# Patient Record
Sex: MALE | Race: WHITE
[De-identification: names, ages, dates, MRNs, and addresses within clinical notes are randomized per-mention and may not be internally consistent; named-entity substitution may affect disease eponyms.]

---

## 2017-01-25 ENCOUNTER — HOSPITAL ENCOUNTER (OUTPATIENT)
Dept: HOSPITAL 45 - C.RDSM | Age: 57
Discharge: HOME | End: 2017-01-25
Attending: ORTHOPAEDIC SURGERY
Payer: COMMERCIAL

## 2017-01-25 DIAGNOSIS — M25.461: Primary | ICD-10-CM

## 2017-01-25 DIAGNOSIS — M17.11: ICD-10-CM

## 2017-01-25 NOTE — DIAGNOSTIC IMAGING REPORT
RIGHT KNEE RADIOGRAPHS WITH COMPARISON STANDING AP RADIOGRAPH OF THE LEFT KNEE



CLINICAL HISTORY: Chronic right knee pain.    



COMPARISON: Knee radiographs August 7, 2015.



FINDINGS:  Comparison standing AP radiograph of left knee demonstrates arthritis

within the patellofemoral compartment is difficult to assess on this exam. There

is marked joint space narrowing with extensive osteophytosis of the

patellofemoral compartment of the right knee. There is moderate medial component

joint space narrowing with osteophytosis. No fracture is identified. There may

be a small right knee joint effusion.



IMPRESSION: 



1. Severe arthritis within the right knee, most pronounced within the medial and

patellofemoral compartments.



2. Small right knee joint effusion.



3. No acute fracture.







Electronically signed by:  Farhat Gaming M.D.

1/25/2017 12:44 PM



Dictated Date/Time:  1/25/2017 12:42 PM

## 2017-03-04 VITALS — HEART RATE: 70 BPM | SYSTOLIC BLOOD PRESSURE: 121 MMHG | OXYGEN SATURATION: 95 % | DIASTOLIC BLOOD PRESSURE: 72 MMHG

## 2017-03-05 ENCOUNTER — HOSPITAL ENCOUNTER (INPATIENT)
Dept: HOSPITAL 45 - C.EDB | Age: 57
LOS: 11 days | Discharge: HOME | DRG: 682 | End: 2017-03-16
Attending: HOSPITALIST | Admitting: HOSPITALIST
Payer: COMMERCIAL

## 2017-03-05 VITALS
HEIGHT: 71 IN | WEIGHT: 315 LBS | HEIGHT: 71 IN | BODY MASS INDEX: 44.1 KG/M2 | BODY MASS INDEX: 44.1 KG/M2 | WEIGHT: 315 LBS

## 2017-03-05 VITALS — SYSTOLIC BLOOD PRESSURE: 101 MMHG | HEART RATE: 76 BPM | DIASTOLIC BLOOD PRESSURE: 52 MMHG

## 2017-03-05 VITALS — HEART RATE: 75 BPM | OXYGEN SATURATION: 98 %

## 2017-03-05 VITALS — DIASTOLIC BLOOD PRESSURE: 76 MMHG | OXYGEN SATURATION: 95 % | HEART RATE: 74 BPM | SYSTOLIC BLOOD PRESSURE: 119 MMHG

## 2017-03-05 VITALS
TEMPERATURE: 97.34 F | SYSTOLIC BLOOD PRESSURE: 87 MMHG | DIASTOLIC BLOOD PRESSURE: 50 MMHG | OXYGEN SATURATION: 91 % | HEART RATE: 68 BPM

## 2017-03-05 VITALS — HEART RATE: 72 BPM | OXYGEN SATURATION: 96 % | DIASTOLIC BLOOD PRESSURE: 70 MMHG | SYSTOLIC BLOOD PRESSURE: 116 MMHG

## 2017-03-05 VITALS
SYSTOLIC BLOOD PRESSURE: 58 MMHG | HEART RATE: 69 BPM | DIASTOLIC BLOOD PRESSURE: 27 MMHG | OXYGEN SATURATION: 95 % | TEMPERATURE: 97.34 F

## 2017-03-05 VITALS
TEMPERATURE: 98.78 F | HEART RATE: 80 BPM | SYSTOLIC BLOOD PRESSURE: 107 MMHG | DIASTOLIC BLOOD PRESSURE: 67 MMHG | OXYGEN SATURATION: 94 %

## 2017-03-05 VITALS — OXYGEN SATURATION: 94 %

## 2017-03-05 VITALS — DIASTOLIC BLOOD PRESSURE: 49 MMHG | HEART RATE: 71 BPM | SYSTOLIC BLOOD PRESSURE: 82 MMHG

## 2017-03-05 VITALS — DIASTOLIC BLOOD PRESSURE: 32 MMHG | SYSTOLIC BLOOD PRESSURE: 64 MMHG | HEART RATE: 70 BPM

## 2017-03-05 VITALS — DIASTOLIC BLOOD PRESSURE: 46 MMHG | HEART RATE: 68 BPM | SYSTOLIC BLOOD PRESSURE: 91 MMHG

## 2017-03-05 VITALS
TEMPERATURE: 98.42 F | SYSTOLIC BLOOD PRESSURE: 122 MMHG | DIASTOLIC BLOOD PRESSURE: 68 MMHG | HEART RATE: 75 BPM | OXYGEN SATURATION: 98 %

## 2017-03-05 VITALS — SYSTOLIC BLOOD PRESSURE: 87 MMHG | HEART RATE: 70 BPM | DIASTOLIC BLOOD PRESSURE: 44 MMHG

## 2017-03-05 DIAGNOSIS — T40.2X1A: ICD-10-CM

## 2017-03-05 DIAGNOSIS — F11.20: ICD-10-CM

## 2017-03-05 DIAGNOSIS — E23.0: ICD-10-CM

## 2017-03-05 DIAGNOSIS — Z81.8: ICD-10-CM

## 2017-03-05 DIAGNOSIS — I95.9: ICD-10-CM

## 2017-03-05 DIAGNOSIS — Z79.52: ICD-10-CM

## 2017-03-05 DIAGNOSIS — G40.909: ICD-10-CM

## 2017-03-05 DIAGNOSIS — E53.8: ICD-10-CM

## 2017-03-05 DIAGNOSIS — E03.9: ICD-10-CM

## 2017-03-05 DIAGNOSIS — E86.1: ICD-10-CM

## 2017-03-05 DIAGNOSIS — N17.0: Primary | ICD-10-CM

## 2017-03-05 DIAGNOSIS — E55.9: ICD-10-CM

## 2017-03-05 DIAGNOSIS — E83.51: ICD-10-CM

## 2017-03-05 DIAGNOSIS — Z91.030: ICD-10-CM

## 2017-03-05 DIAGNOSIS — F10.20: ICD-10-CM

## 2017-03-05 DIAGNOSIS — E83.39: ICD-10-CM

## 2017-03-05 DIAGNOSIS — I12.9: ICD-10-CM

## 2017-03-05 DIAGNOSIS — E27.40: ICD-10-CM

## 2017-03-05 DIAGNOSIS — Z83.6: ICD-10-CM

## 2017-03-05 DIAGNOSIS — E87.6: ICD-10-CM

## 2017-03-05 DIAGNOSIS — M19.90: ICD-10-CM

## 2017-03-05 DIAGNOSIS — E83.42: ICD-10-CM

## 2017-03-05 DIAGNOSIS — E78.5: ICD-10-CM

## 2017-03-05 DIAGNOSIS — K21.9: ICD-10-CM

## 2017-03-05 DIAGNOSIS — G92: ICD-10-CM

## 2017-03-05 DIAGNOSIS — E87.2: ICD-10-CM

## 2017-03-05 DIAGNOSIS — F32.9: ICD-10-CM

## 2017-03-05 DIAGNOSIS — N18.3: ICD-10-CM

## 2017-03-05 DIAGNOSIS — Z82.49: ICD-10-CM

## 2017-03-05 DIAGNOSIS — F17.220: ICD-10-CM

## 2017-03-05 DIAGNOSIS — E66.01: ICD-10-CM

## 2017-03-05 LAB
ALBUMIN/GLOB SERPL: 0.6 {RATIO} (ref 0.9–2)
ALBUMIN/GLOB SERPL: 0.7 {RATIO} (ref 0.9–2)
ALP SERPL-CCNC: 55 U/L (ref 45–117)
ALP SERPL-CCNC: 62 U/L (ref 45–117)
ALT SERPL-CCNC: 39 U/L (ref 12–78)
ALT SERPL-CCNC: 39 U/L (ref 12–78)
ANION GAP SERPL CALC-SCNC: 19 MMOL/L (ref 3–11)
ANION GAP SERPL CALC-SCNC: 21 MMOL/L (ref 3–11)
ANION GAP SERPL CALC-SCNC: 24 MMOL/L (ref 3–11)
APPEARANCE UR: (no result)
AST SERPL-CCNC: 70 U/L (ref 15–37)
AST SERPL-CCNC: 91 U/L (ref 15–37)
BASE EXCESS STD BLDA CALC-SCNC: -11 MEQ/L (ref -9–1.8)
BASE EXCESS STD BLDV CALC-SCNC: -9.9 MMOL/L
BASOPHILS # BLD: 0.01 K/UL (ref 0–0.2)
BASOPHILS NFR BLD: 0.1 %
BENZODIAZ UR-MCNC: (no result) UG/L
BILIRUB UR-MCNC: (no result) MG/DL
BUN SERPL-MCNC: 105 MG/DL (ref 7–18)
BUN SERPL-MCNC: 106 MG/DL (ref 7–18)
BUN SERPL-MCNC: 108 MG/DL (ref 7–18)
BUN/CREAT SERPL: 10.8 (ref 10–20)
BUN/CREAT SERPL: 8.8 (ref 10–20)
BUN/CREAT SERPL: 9.8 (ref 10–20)
CALCIUM SERPL-MCNC: 6.6 MG/DL (ref 8.5–10.1)
CALCIUM SERPL-MCNC: 6.7 MG/DL (ref 8.5–10.1)
CALCIUM SERPL-MCNC: 6.7 MG/DL (ref 8.5–10.1)
CHLORIDE SERPL-SCNC: 91 MMOL/L (ref 98–107)
CHLORIDE SERPL-SCNC: 91 MMOL/L (ref 98–107)
CHLORIDE SERPL-SCNC: 92 MMOL/L (ref 98–107)
CO2 SERPL-SCNC: 15 MMOL/L (ref 21–32)
CO2 SERPL-SCNC: 18 MMOL/L (ref 21–32)
CO2 SERPL-SCNC: 19 MMOL/L (ref 21–32)
COLOR UR: YELLOW
COMPLETE: YES
CREAT CL PREDICTED SERPL C-G-VRATE: 10.4 ML/MIN
CREAT CL PREDICTED SERPL C-G-VRATE: 9.2 ML/MIN
CREAT SERPL-MCNC: 11 MG/DL (ref 0.6–1.4)
CREAT SERPL-MCNC: 12 MG/DL (ref 0.6–1.4)
CREAT SERPL-MCNC: 9.7 MG/DL (ref 0.6–1.4)
CRP SERPL-MCNC: 29.2 MG/DL (ref 0–0.29)
EOSINOPHIL NFR BLD AUTO: 220 K/UL (ref 130–400)
GLOBULIN SER-MCNC: 4.4 GM/DL (ref 2.5–4)
GLOBULIN SER-MCNC: 4.7 GM/DL (ref 2.5–4)
GLUCOSE SERPL-MCNC: 103 MG/DL (ref 70–99)
GLUCOSE SERPL-MCNC: 154 MG/DL (ref 70–99)
GLUCOSE SERPL-MCNC: 85 MG/DL (ref 70–99)
HCT VFR BLD CALC: 41.3 % (ref 42–52)
IG%: 0.3 %
IMM GRANULOCYTES NFR BLD AUTO: 9.6 %
INR PPP: 1 (ref 0.9–1.1)
ISTAT ARTERIAL BLD GAS O2 SAT: 85 % (ref 90–95)
ISTAT ARTERIAL BLOOD GAS HCO3: 18 MEQ/L (ref 19–24)
ISTAT ARTERIAL BLOOD GAS PCO2: 52 MMHG (ref 35–46)
ISTAT ARTERIAL BLOOD GAS PH: 7.15 (ref 7.35–7.45)
ISTAT ARTERIAL BLOOD GAS PO2: 65 MMHG (ref 80–95)
ISTAT CARBON DIOXIDE: 20 MEQ/L (ref 24–31)
ISTAT DELIVERY SYSTEM: (no result)
LYMPHOCYTES # BLD: 1.25 K/UL (ref 1.2–3.4)
MANUAL MICROSCOPIC REQUIRED?: NO
MCH RBC QN AUTO: 31.2 PG (ref 25–34)
MCHC RBC AUTO-ENTMCNC: 34.4 G/DL (ref 32–36)
MCV RBC AUTO: 90.8 FL (ref 80–100)
MONOCYTES NFR BLD: 5.9 %
MUCOUS THREADS URNS QL MICRO: PRESENT
NEUTROPHILS # BLD AUTO: 0.8 %
NEUTROPHILS NFR BLD AUTO: 83.3 %
NITRITE UR QL STRIP: (no result)
PCO2 BLDV: 49 MMHG (ref 38–50)
PCP UR-MCNC: (no result) UG/L
PH UR STRIP: 5 [PH] (ref 4.5–7.5)
PHOSPHATE SERPL-MCNC: 8.9 MG/DL (ref 2.5–4.9)
PHOSPHATE SERPL-MCNC: 9.8 MG/DL (ref 2.5–4.9)
PMV BLD AUTO: 9.4 FL (ref 7.4–10.4)
PO2 BLDV: 68 MMHG
POTASSIUM SERPL-SCNC: 4.1 MMOL/L (ref 3.5–5.1)
POTASSIUM SERPL-SCNC: 4.1 MMOL/L (ref 3.5–5.1)
POTASSIUM SERPL-SCNC: 4.4 MMOL/L (ref 3.5–5.1)
PROTHROMBIN TIME: 11.2 SECONDS (ref 9–12)
RBC # BLD AUTO: 4.55 M/UL (ref 4.7–6.1)
REVIEW REQ?: YES
SAO2 % BLDV FROM PO2: 89.9 %
SODIUM SERPL-SCNC: 129 MMOL/L (ref 136–145)
SODIUM SERPL-SCNC: 130 MMOL/L (ref 136–145)
SODIUM SERPL-SCNC: 131 MMOL/L (ref 136–145)
SP GR UR STRIP: 1.01 (ref 1–1.03)
TSH SERPL-ACNC: < 0.005 UIU/ML (ref 0.3–4.5)
URINE BILL WITH OR WITHOUT MIC: (no result)
URINE EPITHELIAL CELL AUTO: >30 /LPF (ref 0–5)
UROBILINOGEN UR-MCNC: (no result) MG/DL
WBC # BLD AUTO: 12.97 K/UL (ref 4.8–10.8)

## 2017-03-05 PROCEDURE — 05HM03Z INSERTION OF INFUSION DEVICE INTO RIGHT INTERNAL JUGULAR VEIN, OPEN APPROACH: ICD-10-PCS | Performed by: ANESTHESIOLOGY

## 2017-03-05 RX ADMIN — NOREPINEPHRINE BITARTRATE PRN MLS/HR: 1 INJECTION, SOLUTION, CONCENTRATE INTRAVENOUS at 23:04

## 2017-03-05 RX ADMIN — ERGOCALCIFEROL SCH INTERUNIT: 1.25 CAPSULE, LIQUID FILLED ORAL at 17:45

## 2017-03-05 RX ADMIN — HEPARIN SODIUM SCH UNIT: 10000 INJECTION, SOLUTION INTRAVENOUS; SUBCUTANEOUS at 21:50

## 2017-03-05 RX ADMIN — SODIUM CHLORIDE SCH MLS/HR: 900 INJECTION, SOLUTION INTRAVENOUS at 15:09

## 2017-03-05 RX ADMIN — SODIUM CHLORIDE SCH MLS/HR: 900 INJECTION, SOLUTION INTRAVENOUS at 23:03

## 2017-03-05 RX ADMIN — NOREPINEPHRINE BITARTRATE PRN MLS/HR: 1 INJECTION, SOLUTION, CONCENTRATE INTRAVENOUS at 15:07

## 2017-03-05 RX ADMIN — HYDROCORTISONE SODIUM SUCCINATE SCH MLS/MIN: 100 INJECTION, POWDER, FOR SOLUTION INTRAMUSCULAR; INTRAVENOUS at 17:45

## 2017-03-05 RX ADMIN — DOXYCYCLINE SCH MLS/HR: 100 INJECTION, POWDER, LYOPHILIZED, FOR SOLUTION INTRAVENOUS at 15:28

## 2017-03-05 RX ADMIN — CEFTRIAXONE SODIUM SCH MLS/HR: 1 INJECTION, POWDER, FOR SOLUTION INTRAVENOUS at 17:44
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
## 2017-03-05 NOTE — CRITICAL CARE CONSULTATION
Critical Care Consultation


Date of Consultation:


Mar 5, 2017.


Attending Physician:


Odell Rees MD


Reason for Consultation:


Acute renal failure, hypotension


History of Present Illness


Patient is a 57-year-old male who has had waxing and waning mental status over 

the last 2-3 days.  Patient's wife brought the patient to the emergency room 

today for further evaluation.  She stated that within the last week the patient 

was being evaluated for knee pain and was possibly given a prescription for 

Percocet.  There is a history of narcotic use and abuse and reportedly taking 

them every 4 hours.  Upon arrival in the emergency department the patient was 

discovered to have a profoundly elevated BUN and creatinine, he was also given 

Narcan and had some improvement in his mental status.  There are concerns about 

possible volume overload given his chest x-ray findings.





Past Medical/Surgical History


History of alcohol abuse, hypertension, chronic kidney disease stage III, 

depression with anxiety, GERD, gout, history of substance abuse, hyperlipidemia

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 

seizure disorder, osteoarthritis, hypothyroidism.





Family History





Cancer


FH: multiple myeloma


  FATHER


Hypertension


  FATHER





Social History


Smoking Status:  Never Smoker


Smokeless Tobacco Use:  Yes


Alcohol Use:  none


Marital Status:  


Housing Status:  lives with family





Allergies


Coded Allergies:  


     BEE STING (Verified  Allergy, Severe, anaphylaxis, 3/5/17)


     NSAIDs (Verified  Allergy, Unknown, contraindicated d/t med hx., 3/5/17)


     Simvastatin (Verified  Adverse Reaction, Unknown, unknown, 3/5/17)





Home Medications


Scheduled


Amitriptyline Hcl (Elavil), 10 MG PO HS


Atenolol (Atenolol), 50 MG PO DAILY


Benazepril/Hctz (Lotensin Hct), 2 TABS PO DAILY


Diazepam (Valium), 2 MG PO Q12


Duloxetine HCl (Duloxetine HCl), 60 MG PO DAILY


Duloxetine HCl (Duloxetine HCl), 30 MG PO DAILY


Gabapentin (Neurontin), 1 TAB PO TID


Hydrocortisone (Cortef), 10 MG PO QPM


Hydrocortisone (Cortef), 20 MG PO QAM


Levothyroxine Sodium (Synthroid), 300 MCG PO DAILY


Lorazepam (Lorazepam), 1 TAB PO BID


Omeprazole (Prilosec), 40 MG PO DAILY


Sertraline HCl (Sertraline HCl), 50 MG PO BID


Somatropin (Humatrope), 1 EA SQ DAILY


Testosterone Cypionate (Testosterone Cypionate), 100 MG IM WK


Zolpidem Tartrate (Ambien), 10 MG PO HS





Scheduled PRN


Acetaminophen (Tylenol), 1,000 MG PO TID PRN for Pain


Furosemide (Furosemide), 40 MG PO DAILY PRN for edema


Oxycodone/Acetaminophen 10MG/325MG (Percocet 10MG/325MG), 1 TAB PO Q6 PRN for 

Pain





Current Inpatient Medications





 Current Inpatient Medications








 Medications


  (Trade)  Dose


 Ordered  Sig/Joce


 Route  Start Time


 Stop Time Status Last Admin


Dose Admin


 


 Ceftriaxone


 Sodium 1 gm/


 Dextrose  50 ml @ 


 100 mls/hr  Q24H


 IV  3/5/17 15:00


 3/12/17 14:59   


 


 


 Doxycycline


 Hyclate/Dextrose


  (Vibramycin IV/


 D5 100ml)  110 ml @ 


 50 mls/hr  Q12H


 IV  3/5/17 15:00


 3/12/17 14:59   


 


 


 Heparin Sodium


  (Porcine) 5000


 unit  5,000 unit  Q12H


 SQ  3/5/17 12:45


 4/4/17 12:44 UNV  


 


 


 Hydrocortisone


 Sodium Succinate


 50 mg/Syringe  1 ml @ 4


 mls/min  Q6H


 IV  3/5/17 18:30


 4/4/17 18:29   


 


 


 Sodium Chloride


  (Nss 1000ml)  1,000 ml @ 


 125 mls/hr  Q8H


 IV  3/5/17 12:45


 4/4/17 12:44   


 


 


 Levothyroxine


 Sodium


  (Synthroid Tab)  300 mcg  DAILYBB


 PO  3/6/17 06:00


 4/5/17 05:59   


 


 


 Non-Formulary


 Medication


  (Somatropin


  (Humatrope))  1 ea  DAILY


 SQ  3/6/17 09:00


 4/5/17 08:59 UNV  


 


 


 Testosterone


 Cypionate


  (Depo-Testosterone


 Inj)  100 mg  UD


 IM  3/6/17 13:00


 4/5/17 12:59 UNV  


 


 


 Gabapentin 100 mg  100 mg  QAM


 PO  3/5/17 15:00


 4/4/17 14:59   


 


 


 Norepinephrine


 Bitartrate/


 Dextrose


  (Levophed Inj/


 D5W 500ml)  508 ml @ 0


 mls/hr  Q0M PRN


 IV  3/5/17 15:00


 4/4/17 14:59   


 











Review of Systems


See above for pertinent positives & negatives. A total of 10 systems reviewed 

and were otherwise negative.


Respiratory:  No cough


Cardiovascular:  No chest pain


Genitourinary - Male:  No dysuria, No urinary frequency





Physical Exam











  Date Time  Temp Pulse Resp B/P Pulse Ox O2 Delivery O2 Flow Rate FiO2


 


3/5/17 14:15 36.3 71 22 87/44 91 Nasal Cannula 4.0 


 


3/5/17 14:01 37.0 68 18 89/48 96   


 


3/5/17 12:56  68 18 89/48 96 Nasal Cannula 3.0 


 


3/5/17 11:26  62 20 79/44 98   


 


3/5/17 11:25  62 20 80/35 98 Nasal Cannula 4.0 


 


3/5/17 11:07  63 16 68/39 95 Nasal Cannula 3.0 


 


3/5/17 10:41  63  66/34   4.0 


 


3/5/17 09:40  65      


 


3/5/17 09:31     97  2.0 


 


3/5/17 09:17 37.0 73 20  87 Room Air  








General Appearance:  WD/WN, no apparent distress


Head:  normocephalic, atraumatic


Eyes:  PERRLA, EOMI


Neck:  normal range of motion, no tenderness, trachea midline, no stridor, 

supple, no thyromegaly


Respiratory:  no respiratory distress


Cardiovasular:  no M/G/R, irregular rate (bradycardia)


Abdomen:  non tender, normal bowel sounds, no rebound, no masses


Back:  normal inspection, no midline tenderness


Edema:  Bilateral LE (2+)


Neuro:  decreased LOC (required prompting 1 to elicit correct day and month)





Laboratory Results





Last 24 Hours








Test


  3/5/17


09:28 3/5/17


10:10 3/5/17


10:50 3/5/17


13:59


 


Bedside Glucose 84 mg/dl    


 


Urine Opiates Screen  POS   


 


Urine Methadone, Qualitative  NEG   


 


Urine Barbiturates  NEG   


 


Urine Phencyclidine (PCP)


Level 


  NEG 


  


  


 


 


Ur


Amphetamine/Methamphetamine 


  NEG 


  


  


 


 


MDMA (Ecstasy) Screen  NEG   


 


Urine Benzodiazepines Screen  NEG   


 


Urine Cocaine Metabolite  NEG   


 


Urine Marijuana (THC)  NEG   


 


White Blood Count   12.97 K/uL  


 


Red Blood Count   4.55 M/uL  


 


Hemoglobin   14.2 g/dL  


 


Hematocrit   41.3 %  


 


Mean Corpuscular Volume   90.8 fL  


 


Mean Corpuscular Hemoglobin   31.2 pg  


 


Mean Corpuscular Hemoglobin


Concent 


  


  34.4 g/dl 


  


 


 


Platelet Count   220 K/uL  


 


Mean Platelet Volume   9.4 fL  


 


Neutrophils (%) (Auto)   83.3 %  


 


Lymphocytes (%) (Auto)   9.6 %  


 


Monocytes (%) (Auto)   5.9 %  


 


Eosinophils (%) (Auto)   0.8 %  


 


Basophils (%) (Auto)   0.1 %  


 


Neutrophils # (Auto)   10.81 K/uL  


 


Lymphocytes # (Auto)   1.25 K/uL  


 


Monocytes # (Auto)   0.76 K/uL  


 


Eosinophils # (Auto)   0.10 K/uL  


 


Basophils # (Auto)   0.01 K/uL  


 


RDW Standard Deviation   53.5 fL  


 


RDW Coefficient of Variation   16.3 %  


 


Immature Granulocyte % (Auto)   0.3 %  


 


Immature Granulocyte # (Auto)   0.04 K/uL  


 


Prothrombin Time   11.2 SECONDS  


 


Prothromb Time International


Ratio 


  


  1.0 


  


 


 


Sodium Level   131 mmol/L  


 


Potassium Level   4.1 mmol/L  


 


Chloride Level   91 mmol/L  


 


Carbon Dioxide Level   19 mmol/L  


 


Anion Gap   21.0 mmol/L  


 


Blood Urea Nitrogen   106 mg/dl  


 


Creatinine   12.00 mg/dl  


 


Estimated GFR (


American) 


  


  4.8 


  


 


 


Estimated GFR (Non-


American 


  


  4.2 


  


 


 


BUN/Creatinine Ratio   8.8  


 


Random Glucose   85 mg/dl  


 


Calcium Level   6.6 mg/dl  


 


Total Bilirubin   0.6 mg/dl  


 


Aspartate Amino Transf


(AST/SGOT) 


  


  70 U/L 


  


 


 


Alanine Aminotransferase


(ALT/SGPT) 


  


  39 U/L 


  


 


 


Alkaline Phosphatase   55 U/L  


 


Total Creatine Kinase   3280 U/L  


 


Troponin I   < 0.015 ng/ml  


 


Total Protein   7.6 gm/dl  


 


Albumin   3.2 gm/dl  


 


Globulin   4.4 gm/dl  


 


Albumin/Globulin Ratio   0.7  


 


25-Hydroxy Vitamin D Total   4.4 ng/ml  


 


Procalcitonin   11.94 ng/mL  


 


Ethyl Alcohol mg/dL   < 3.0 mg/dl  


 


Lactic Acid Level    1.3 mmol/L 


 


Ionized Calcium    0.79 mmol/l 


 


Parathyroid Hormone (Intact)    275.6 pg/mL 











Diagnostic Results


HEAD CT NONCONTRAST





CT DOSE: 786.26 mGy.cm





HISTORY: Mental status change  confusion





TECHNIQUE: Multiaxial CT images of the head were performed without the use of


intravenous contrast.





Comparison: 11/5/2016





Findings: The paranasal sinuses and mastoid air cells are clear. The calvarium


and skull base are intact. The ventricles and sulci are within normal limits.


There is no mass, hematoma, midline shift, or acute infarct.





Impression:


No acute intracranial abnormality. 











Electronically signed by:  Russell Talavera M.D.


3/5/2017 11:04 AM





Dictated Date/Time:  3/5/2017 11:02 AM





CHEST ONE VIEW PORTABLE





CLINICAL HISTORY: confusion dyspnea





COMPARISON STUDY:  11/5/2016





FINDINGS: Cardiomegaly. Pulmonary vasculature. Diaphragms are smooth. 





IMPRESSION:  Congestive heart failure 














Electronically signed by:  Russell Talavera M.D.


3/5/2017 10:10 AM





Dictated Date/Time:  3/5/2017 10:10 AM





RENAL ULTRASOUND





HISTORY: Flank pain. Renal insufficiency. AKIN





COMPARISON:  None.





FINDINGS:





Right kidney: Maximum dimension 11.2 cm. No evidence for hydronephrosis Normal


corticomedullary differentiation and cortical thickness.





Left kidney:  Maximum dimension 11.5 cm. No evidence for hydronephrosis Normal


corticomedullary differentiation and cortical thickness.





Bladder: No bladder wall thickening. The bilateral ureteral jets were


identified.





IMPRESSION:  


Normal renal ultrasound. 











Electronically signed by:  Russell Talavera M.D.


3/5/2017 3:17 PM





Limited bedside ultrasound of the chest: Right chest revealed a type a profile 

without any evidence of pulmonary edema, the left chest demonstrated early 

infiltrative process and mild B1 and B3 lines.  Concerning differential 

includes early pneumonic process.





Assessment & Plan


Reason Critically Ill: Acute renal failure, likely narcotic overdose secondary 

to renal failure and hypotension requiring vasoactive medications





PLAN:


Neuro: Metabolic encephalopathy: Likely multi-factorial, azotemia and 

metabolites of opiates.


Resp: Type AB profile, concern for possibly early infiltrative process of the 

left lung: Community-acquired process: Rocephin and doxycycline.  BMI is 31, 

hypoxia likely has obstructive sleep apnea will place on CPAP


CV: Hypotension, likely multifactorial secondary to azotemia and narcotic use, 

check echo, troponins unremarkable 1, history of panhypopituitarism


Fluids/Renal: Hypocalcemia, likely secondary to acute renal failure, Lasix 

challenge


ID: Rocephin and doxycycline for pulmonary infection 7 days of treatment


GI/Nutrition: Nothing by mouth until mental status improves


Heme: Heparin 5000 units twice a day for prophylaxis


Endocrine: Panhypopituitarism, hydrocortisone 50 mg every 6 hours, 

hypothyroidism levothyroxine 300 g by mouth daily, testosterone injections


Vascular access: Right IJ placed 03/05/2017





CODE STATUS: Patient does not desire resuscitative efforts in event of cardiac 

arrest.  He is agreeable with intubation for volume overload or decreased 

mental status.


Patient surrogate decision maker would be his wife, he does not have a pre-

existing healthcare directive





I have personally spent 45 minutes of critical care time in the direct 

management of this patient.  This is a life/limb threatening event.  This 

includes time spent evaluating patient, direct bedside care, chart review, 

placing orders, interpretation of diagnostic studies, discussion with 

consultants, patient, and family members, as well as other required patient 

management activities.


This time is exclusive of all separately billable procedures, and teaching time 

and separate from and in addition to any other critical care service time.

## 2017-03-05 NOTE — DIAGNOSTIC IMAGING REPORT
HEAD CT NONCONTRAST



CT DOSE: 786.26 mGy.cm



HISTORY: Mental status change  confusion



TECHNIQUE: Multiaxial CT images of the head were performed without the use of

intravenous contrast.



Comparison: 11/5/2016



Findings: The paranasal sinuses and mastoid air cells are clear. The calvarium

and skull base are intact. The ventricles and sulci are within normal limits.

There is no mass, hematoma, midline shift, or acute infarct.



Impression:

No acute intracranial abnormality. 







Electronically signed by:  Russell Talavera M.D.

3/5/2017 11:04 AM



Dictated Date/Time:  3/5/2017 11:02 AM

## 2017-03-05 NOTE — DIAGNOSTIC IMAGING REPORT
RENAL ULTRASOUND



HISTORY: Flank pain. Renal insufficiency. AKIN



COMPARISON:  None.



FINDINGS:



Right kidney: Maximum dimension 11.2 cm. No evidence for hydronephrosis Normal

corticomedullary differentiation and cortical thickness.



Left kidney:  Maximum dimension 11.5 cm. No evidence for hydronephrosis Normal

corticomedullary differentiation and cortical thickness.



Bladder: No bladder wall thickening. The bilateral ureteral jets were

identified.



IMPRESSION:  

Normal renal ultrasound. 







Electronically signed by:  Russell Talavera M.D.

3/5/2017 3:17 PM



Dictated Date/Time:  3/5/2017 3:15 PM

## 2017-03-05 NOTE — DIAGNOSTIC IMAGING REPORT
CHEST ONE VIEW PORTABLE



CLINICAL HISTORY: confusion dyspnea



COMPARISON STUDY:  11/5/2016



FINDINGS: Cardiomegaly. Pulmonary vasculature. Diaphragms are smooth. 



IMPRESSION:  Congestive heart failure 









Electronically signed by:  Russell aTlavera M.D.

3/5/2017 10:10 AM



Dictated Date/Time:  3/5/2017 10:10 AM

## 2017-03-05 NOTE — DIAGNOSTIC IMAGING REPORT
CHEST ONE VIEW PORTABLE



CLINICAL HISTORY: S/P CENTRAL LINE PLACEMENT    



COMPARISON STUDY:  Early to same date



FINDINGS: Stable cardiac enlargement. Central catheter place in superior vena

cava. No evidence pneumothorax. 



IMPRESSION:  Central catheter placed in the vena cava. No evidence pneumothorax.











Electronically signed by:  Russell Talavera M.D.

3/5/2017 4:58 PM



Dictated Date/Time:  3/5/2017 4:57 PM

## 2017-03-05 NOTE — NEPHROLOGY CONSULTATION
DATE OF CONSULTATION:  03/05/2017

 

ATTENDING OF RECORD:  Dr. Rees.

 

REASON FOR CONSULTATION:  YI.

 

HISTORY OF PRESENT ILLNESS:  This is a 57-year-old male.  He presents with

extreme lethargy, altered mental status, ambulatory dysfunction while taking

Percocet for the past week for chronic back pain issues and presents with a

creatinine of 12.  The patient back in 2013 had a very similar presentation. 

At that time, the patient's creatinine was 11 and the patient was found to be

hypotensive with systolic pressures in the 70s and the patient was treated

with a Narcan drip.  During that presentation, calcium levels were low and

the patient was repleted with aggressive calcium and the patient started to

clinically improve and the patient avoided needing dialysis, creatinine

eventually improved down to 1.5.  The patient was seen by the [facility] at that time in 2013 when he was being followed by Dr. Rodriguez and Dr. Rodriguez no longer follows with the patient secondary to his

chronic pain medication habit, currently now being followed by a [facility]

primary, so I have been asked to evaluate the patient.  The patient in the

Emergency Room was given Narcan to help reverse the pain medications and the

patient knows what year it is, knows the president, knows that he is in [facility], although still is quite lethargic.  His wife feels that

he looks much better now compared to presentation.  Mendieta catheter was placed

and patient does have good urine output.  There was some suggestion of

congestive heart failure; however, appears more like an infiltrative process

and being treated as pneumonia and antibiotics have been started.  The

patient is hypotensive requiring pressor as well as IV fluids and has

been started on stress dose steroids given his history of panhypopituitarism,

requiring oral steroids at baseline.  The patient denies any significant

itching.  No nausea or vomiting and still eating but is quite lethargic for

the past 2 days and has been noticed to be twitching, which is likely a side

effect of one of the medications he is on in the setting of renal failure.

 

REVIEW OF SYSTEMS:  No headaches, no blurry vision.  Positive twitching, no

overt shortness of breath, no chest pain, no nausea or vomiting, no diarrhea

or constipation.  The patient's urine is dark in color.  Does have chronic

pain issues and chronic back pain.  All other review of systems otherwise

negative.

 

PAST MEDICAL HISTORY:  CKD stage III with baseline creatinine of 1.5, alcohol

abuse, hypertension, chronic substance abuse issues, gout, GERD, depression,

hyperlipidemia, hypothyroidism; panhypopituitarism, requiring chronic

steroids; seizure disorder, and history of subdural hematoma.

 

PAST SURGICAL HISTORY:  Transsphenoidal pituitary resection, tonsillectomy.

 

FAMILY HISTORY:  Significant for father with multiple myeloma, but did not

require dialysis.  Father also had hypertension.

 

SOCIAL HISTORY:  No smoking but does chew tobacco.  Denies alcohol use and

denies illegal substances is  and lives with wife.

 

HOME MEDICATIONS:  Significant for amitriptyline 10 mg at night, atenolol 50

mg daily, benazepril/hydrochlorothiazide, Valium 2 mg p.o. q. 12, duloxetine

90 mg daily, Neurontin 3 times a day, hydrocortisone 20 mg in the morning and

10 mg at night, levothyroxine 300 mcg daily, sertraline 50 mg p.o. b.i.d.,

and Ambien 10 mg at night.

 

CURRENT MEDICATIONS:  Depo testosterone 100 mcg IM weekly, levothyroxine 300

mcg daily, heparin 5,000 units subQ q. 12, hydrocortisone 50 mg IV q. 6,

ceftriaxone 1 gram IV q. 24, doxycycline 100 mg IV q. 12, Neurontin 100 mg

daily, Levophed, normal saline at 125 mL an hour.  The patient received 100

mg hydrocortisone, 1 gram of calcium gluconate, 2 doses of Narcan, 1 dose of

Lasix as well as a 500 mL of fluid bolus.

 

PHYSICAL EXAMINATION:

VITAL SIGNS:  Temperature 36.3, pulse 68, respiratory rate is 22, blood

pressure 91/46, satting 91% on 4 liters.

GENERAL:  Awake, alert, oriented x3, obese, lethargic but arousable and does

answer questions.

EYES:  No scleral icterus.

HEENT:  Mucous membranes are dry.

NECK:  Supple.

PULMONARY:  Decreased breath sounds at the bases.

CARDIAC:  Distant heart sounds.

ABDOMEN:  Bowel sounds positive, although hypoactive; soft, nontender.

EXTREMITIES:  No significant clubbing, cyanosis or edema.

NEUROLOGICALLY:  Lethargic and has some twitching.

DERMATOLOGIC:  No rash or ulcers noted.

 

LABORATORIES AND IMAGING DATA:  White count is 12, H\T\H 14 and 41 and platelet

count is 220.  Sodium level is 131, potassium 4.1, chloride is 91,

bicarbonate is 19, BUN is 106, creatinine is 12, glucose is 85.  Calcium 6.6,

AST is 70, total CK 3280, procalcitonin 12, albumin is 3.2.  PTH of 275,

vitamin D of 4.4, ionized calcium 0.79, lactic acid of 1.3.  INR is 1. 

Opiates are positive.  Ethyl alcohol negative.  Urine tox screen otherwise

negative.  Blood cultures pending.  Renal ultrasound with right kidney of

11.2 cm and the left kidney 11.5 cm.  Normal renal ultrasound.  Chest x-ray

shows congestive heart failure.  Head CT shows no acute intracranial

abnormality.

 

ASSESSMENT AND PLAN:

1.  Acute kidney injury in the setting of pain medication, causing volume

depletion and hypotension, appears significant enough to have caused acute

tubular necrosis.  Checking a urinalysis with micro as well as a random urine

sodium now.  The patient's potassium levels are stable; the patient is not

severely acidotic and denies ingesting any ethylene glycol or methanol. 

Indication for dialysis would be clearance of medications that may have build

up in his system in the setting of renal failure.  The patient does have some

twitching, which is likely side effect of one of the meds that he is on.  I

would like to stop the Neurontin completely for now until kidney function

starts to improve.  Discussed case with Dr. Garza of pulmonary critical

care and we are repeating lab work now.  Mental status appears improved,

although the patient is still lethargic.  If the patient's mental status

continues to slowly improve, will hold off on dialysis; however, if mental

status starts to worsen, will plan on dialysis.  For now, repeat labs,

continue to monitor mental status.  Currently on antibiotics for possible

pneumonia.

2.  Hypocalcemia.  Calcium level is dropping likely secondary to elevated

phosphorus in the setting of renal failure, given 1 gram of calcium

gluconate and likely need another gram, so will go ahead and order that and 

check phosphorus levels with the next set of labs.

3.  Metabolic acidosis.  Bicarb levels are stable at 19.  Will be checking a

VBG to check a pH.  If the patient does become severely acidotic, that would

be another indication for possible dialysis.  



Overall, back in 2013 when

patient had a very similar presentation with a creatinine up to 11, low blood

pressures and low calcium levels, they were able to avoid dialysis during

that admission and the patient is currently stable on the pressors,

mentating well.  Did check an echo of his heart to assess cardiac function as

well as rechecking the thyroid levels and continue on stress dose steroids. 



Overall, poor prognosis given the constellation of findings; however, no

emergent need for dialysis unless mental status starts to worsen. 



I appreciate the consultation.

 

 

 

[place]
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
## 2017-03-05 NOTE — PROCEDURE NOTE
Procedure Note


Procedure Date


Mar 5, 2017.





Central Line


Procedure time out:  side/site verified, patient ID confirmed, sterile 

procedure used


Consent obtained:  written


Time of procedure:  17:00


Performed by:  attending


Indications:  central drug admin.


Contraindications:  other (azotemia)


Prep:  chlorhexadine prep, sterile drape, sterile procedures used


Anesthesia:  lidocaine 1% without epi


Volume anesthetic (ml's):  3


Central line lumen:  triple


Central line location:  internal jugular (R)


Additional details:  percutaneous placement, ultrasound guidance, line not 

sutured (commercial securement device)


CXR:  appropriate position


Complications:  none


Patient tolerated procedure:  well


Post-procedure vital signs:  reviewed and stable

## 2017-03-06 VITALS
DIASTOLIC BLOOD PRESSURE: 75 MMHG | OXYGEN SATURATION: 92 % | HEART RATE: 65 BPM | TEMPERATURE: 98.96 F | SYSTOLIC BLOOD PRESSURE: 114 MMHG

## 2017-03-06 VITALS
SYSTOLIC BLOOD PRESSURE: 148 MMHG | HEART RATE: 70 BPM | TEMPERATURE: 98.78 F | DIASTOLIC BLOOD PRESSURE: 77 MMHG | OXYGEN SATURATION: 94 %

## 2017-03-06 VITALS — TEMPERATURE: 98.6 F | OXYGEN SATURATION: 96 % | HEART RATE: 68 BPM | SYSTOLIC BLOOD PRESSURE: 114 MMHG

## 2017-03-06 VITALS — HEART RATE: 85 BPM | SYSTOLIC BLOOD PRESSURE: 115 MMHG | OXYGEN SATURATION: 92 % | DIASTOLIC BLOOD PRESSURE: 67 MMHG

## 2017-03-06 VITALS
OXYGEN SATURATION: 92 % | HEART RATE: 84 BPM | TEMPERATURE: 98.24 F | DIASTOLIC BLOOD PRESSURE: 60 MMHG | SYSTOLIC BLOOD PRESSURE: 133 MMHG

## 2017-03-06 VITALS — SYSTOLIC BLOOD PRESSURE: 96 MMHG | OXYGEN SATURATION: 90 % | DIASTOLIC BLOOD PRESSURE: 58 MMHG | HEART RATE: 76 BPM

## 2017-03-06 VITALS
TEMPERATURE: 98.24 F | OXYGEN SATURATION: 94 % | SYSTOLIC BLOOD PRESSURE: 115 MMHG | DIASTOLIC BLOOD PRESSURE: 60 MMHG | HEART RATE: 75 BPM

## 2017-03-06 VITALS — OXYGEN SATURATION: 92 % | HEART RATE: 64 BPM | DIASTOLIC BLOOD PRESSURE: 70 MMHG | SYSTOLIC BLOOD PRESSURE: 113 MMHG

## 2017-03-06 VITALS — HEART RATE: 78 BPM | SYSTOLIC BLOOD PRESSURE: 111 MMHG | OXYGEN SATURATION: 92 % | DIASTOLIC BLOOD PRESSURE: 71 MMHG

## 2017-03-06 VITALS — HEART RATE: 66 BPM | DIASTOLIC BLOOD PRESSURE: 70 MMHG | SYSTOLIC BLOOD PRESSURE: 113 MMHG | OXYGEN SATURATION: 90 %

## 2017-03-06 VITALS — HEART RATE: 74 BPM | SYSTOLIC BLOOD PRESSURE: 103 MMHG | OXYGEN SATURATION: 94 % | DIASTOLIC BLOOD PRESSURE: 60 MMHG

## 2017-03-06 VITALS
TEMPERATURE: 98.78 F | DIASTOLIC BLOOD PRESSURE: 61 MMHG | SYSTOLIC BLOOD PRESSURE: 107 MMHG | HEART RATE: 69 BPM | OXYGEN SATURATION: 92 %

## 2017-03-06 VITALS — HEART RATE: 72 BPM | OXYGEN SATURATION: 97 %

## 2017-03-06 VITALS — DIASTOLIC BLOOD PRESSURE: 77 MMHG | OXYGEN SATURATION: 97 % | SYSTOLIC BLOOD PRESSURE: 120 MMHG | HEART RATE: 65 BPM

## 2017-03-06 LAB
ANION GAP SERPL CALC-SCNC: 14 MMOL/L (ref 3–11)
ANION GAP SERPL CALC-SCNC: 15 MMOL/L (ref 3–11)
ANION GAP SERPL CALC-SCNC: 16 MMOL/L (ref 3–11)
BUN SERPL-MCNC: 75 MG/DL (ref 7–18)
BUN SERPL-MCNC: 85 MG/DL (ref 7–18)
BUN SERPL-MCNC: 99 MG/DL (ref 7–18)
BUN/CREAT SERPL: 14.3 (ref 10–20)
BUN/CREAT SERPL: 19.3 (ref 10–20)
BUN/CREAT SERPL: 22.7 (ref 10–20)
CALCIUM SERPL-MCNC: 6.6 MG/DL (ref 8.5–10.1)
CALCIUM SERPL-MCNC: 6.8 MG/DL (ref 8.5–10.1)
CALCIUM SERPL-MCNC: 6.9 MG/DL (ref 8.5–10.1)
CHLORIDE SERPL-SCNC: 100 MMOL/L (ref 98–107)
CHLORIDE SERPL-SCNC: 100 MMOL/L (ref 98–107)
CHLORIDE SERPL-SCNC: 98 MMOL/L (ref 98–107)
CO2 SERPL-SCNC: 20 MMOL/L (ref 21–32)
CO2 SERPL-SCNC: 21 MMOL/L (ref 21–32)
CO2 SERPL-SCNC: 23 MMOL/L (ref 21–32)
CREAT CL PREDICTED SERPL C-G-VRATE: 17.6 ML/MIN
CREAT CL PREDICTED SERPL C-G-VRATE: 27.6 ML/MIN
CREAT CL PREDICTED SERPL C-G-VRATE: 36.8 ML/MIN
CREAT SERPL-MCNC: 3.3 MG/DL (ref 0.6–1.4)
CREAT SERPL-MCNC: 4.4 MG/DL (ref 0.6–1.4)
CREAT SERPL-MCNC: 6.9 MG/DL (ref 0.6–1.4)
EOSINOPHIL NFR BLD AUTO: 213 K/UL (ref 130–400)
GLUCOSE SERPL-MCNC: 132 MG/DL (ref 70–99)
GLUCOSE SERPL-MCNC: 138 MG/DL (ref 70–99)
GLUCOSE SERPL-MCNC: 168 MG/DL (ref 70–99)
HCT VFR BLD CALC: 43.9 % (ref 42–52)
MAGNESIUM SERPL-MCNC: 1.8 MG/DL (ref 1.8–2.4)
MAGNESIUM SERPL-MCNC: 2.2 MG/DL (ref 1.8–2.4)
MCH RBC QN AUTO: 30.7 PG (ref 25–34)
MCHC RBC AUTO-ENTMCNC: 33.9 G/DL (ref 32–36)
MCV RBC AUTO: 90.3 FL (ref 80–100)
PHOSPHATE SERPL-MCNC: 4.6 MG/DL (ref 2.5–4.9)
PHOSPHATE SERPL-MCNC: 7.2 MG/DL (ref 2.5–4.9)
PMV BLD AUTO: 9.9 FL (ref 7.4–10.4)
POTASSIUM SERPL-SCNC: 3.2 MMOL/L (ref 3.5–5.1)
POTASSIUM SERPL-SCNC: 3.3 MMOL/L (ref 3.5–5.1)
POTASSIUM SERPL-SCNC: 3.9 MMOL/L (ref 3.5–5.1)
RBC # BLD AUTO: 4.86 M/UL (ref 4.7–6.1)
SODIUM SERPL-SCNC: 134 MMOL/L (ref 136–145)
SODIUM SERPL-SCNC: 136 MMOL/L (ref 136–145)
SODIUM SERPL-SCNC: 137 MMOL/L (ref 136–145)
TSH SERPL-ACNC: < 0.005 UIU/ML (ref 0.3–4.5)
WBC # BLD AUTO: 11.51 K/UL (ref 4.8–10.8)

## 2017-03-06 RX ADMIN — NOREPINEPHRINE BITARTRATE PRN MLS/HR: 1 INJECTION, SOLUTION, CONCENTRATE INTRAVENOUS at 08:58

## 2017-03-06 RX ADMIN — HYDROCORTISONE SODIUM SUCCINATE SCH MLS/MIN: 100 INJECTION, POWDER, FOR SOLUTION INTRAMUSCULAR; INTRAVENOUS at 18:20

## 2017-03-06 RX ADMIN — HYDROCORTISONE SODIUM SUCCINATE SCH MLS/MIN: 100 INJECTION, POWDER, FOR SOLUTION INTRAMUSCULAR; INTRAVENOUS at 06:23

## 2017-03-06 RX ADMIN — DOXYCYCLINE SCH MLS/HR: 100 INJECTION, POWDER, LYOPHILIZED, FOR SOLUTION INTRAVENOUS at 16:35

## 2017-03-06 RX ADMIN — SODIUM CHLORIDE SCH MLS/HR: 900 INJECTION, SOLUTION INTRAVENOUS at 16:32

## 2017-03-06 RX ADMIN — DOXYCYCLINE SCH MLS/HR: 100 INJECTION, POWDER, LYOPHILIZED, FOR SOLUTION INTRAVENOUS at 04:20

## 2017-03-06 RX ADMIN — CEFTRIAXONE SODIUM SCH MLS/HR: 1 INJECTION, POWDER, FOR SOLUTION INTRAVENOUS at 16:32

## 2017-03-06 RX ADMIN — HYDROCORTISONE SODIUM SUCCINATE SCH MLS/MIN: 100 INJECTION, POWDER, FOR SOLUTION INTRAMUSCULAR; INTRAVENOUS at 12:57

## 2017-03-06 RX ADMIN — MORPHINE SULFATE PRN MG: 2 INJECTION, SOLUTION INTRAMUSCULAR; INTRAVENOUS at 22:33

## 2017-03-06 RX ADMIN — SODIUM CHLORIDE SCH MLS/HR: 900 INJECTION, SOLUTION INTRAVENOUS at 08:53

## 2017-03-06 RX ADMIN — HYDROCORTISONE SODIUM SUCCINATE SCH MLS/MIN: 100 INJECTION, POWDER, FOR SOLUTION INTRAMUSCULAR; INTRAVENOUS at 00:42

## 2017-03-06 RX ADMIN — LORAZEPAM SCH MG: 1 TABLET ORAL at 20:48

## 2017-03-06 RX ADMIN — SODIUM CHLORIDE SCH MLS/HR: 900 INJECTION, SOLUTION INTRAVENOUS at 19:55

## 2017-03-06 RX ADMIN — MORPHINE SULFATE PRN MG: 2 INJECTION, SOLUTION INTRAMUSCULAR; INTRAVENOUS at 13:02

## 2017-03-06 RX ADMIN — ALUMINUM ZIRCONIUM TRICHLOROHYDREX GLY SCH EA: 0.2 STICK TOPICAL at 20:25

## 2017-03-06 RX ADMIN — HEPARIN SODIUM SCH UNIT: 10000 INJECTION, SOLUTION INTRAVENOUS; SUBCUTANEOUS at 20:49

## 2017-03-06 RX ADMIN — HEPARIN SODIUM SCH UNIT: 10000 INJECTION, SOLUTION INTRAVENOUS; SUBCUTANEOUS at 08:59

## 2017-03-06 RX ADMIN — DOCUSATE SODIUM SCH MG: 100 CAPSULE, LIQUID FILLED ORAL at 19:54

## 2017-03-06 RX ADMIN — ERGOCALCIFEROL SCH INTERUNIT: 1.25 CAPSULE, LIQUID FILLED ORAL at 08:52

## 2017-03-06 RX ADMIN — LEVOTHYROXINE SODIUM SCH MCG: 150 TABLET ORAL at 10:03

## 2017-03-06 NOTE — ECHOCARDIOGRAM REPORT
*NOTICE TO RECEIVING PARTY AGENCY**  This information is strictly Confidential and protected under 
[place] law.  [place] law prohibits you from making any further disclosure of this 
information unless further disclosure is expressly permitted by the written consent of the person to 
whom it pertains or is authorized by law.  A general authorization for the release of medical or 
other information is not sufficient for this purpose.  Hospital accepts no responsibility if the 
information is made available to any other person, INCLUDING THE PATIENT.



Interpretation Summary

  *  EXTREMELY LIMITED IMAGES, EVEN LIMITED WITH USE OF DEFINITY

  *  Name: AVIS RUVALCABA  Study Date: 2017 04:09 PM  BP: 91/46 mmHg

  *  MRN: [id number]  Patient Location: 105  HR: 75

  *  : 1960 (M/d/yyyy)  Gender: Male  Height: 71 in

  *  Age: 57 yrs  Ethnicity: [place]  Weight: 322 lb

  *  Performed By: Giovanna Arevalo RDCS

  *  Accession# [id number]  Account# [id number]

  *  Reason For Study: HYPOTENSION

  *  BSA: 2.6 m2

  *  History: HYPOTENSION

  *  -- Conclusions --

  *  The study was technically difficult with veryl limited imaging.

  *  The study is technically limited despite the administration of ultrasound contrast.

  *  The left ventricle is normal in size.

  *  The LV wall motion is gross normal on limited evaluation.

  *  No regional wall motion abnormalities noted.

  *  The left ventricular ejection fraction is grossly normal, >65%

  *  No hemodynamically significant valvular aortic stenosis.

  *  There is no mitral valve stenosis.

  *  There is no pericardial effusion.

Procedure Details

  *  A contrast injection of Definity was performed to improve assessment of LV function.

  *  Contrast was injected into an intravenous site in the left arm.

  *  One vial of Definity ultrasound contrast was diluted in normal saline to a total volume of 10 
ml.  A total of '3' ml of solution was administered during imaging.

  *  Lot # [id number] of Definity utilized for procedure.

  *  Expiration date 1 .

  *  The attending nurse who injected the contrast agent was FABRICE BROWNE RN.

Left Ventricle

  *  The left ventricle is normal in size.

  *  The left ventricular ejection fraction is grossly normal.

  *  The LV wall motion is gross normal on limited evaluation.

  *  No regional wall motion abnormalities noted.

Mitral Valve

  *  The mitral valve is not well visualized.

  *  There is no mitral valve stenosis.

Tricuspid Valve

  *  The tricuspid valve is not well visualized.

Aortic Valve

  *  The aortic valve is not well visualized.

  *  No hemodynamically significant valvular aortic stenosis.

Pulmonic Valve

  *  The pulmonic valve is not well visualized.

Pericardium/Pleural

  *  There is no pericardial effusion.



Doppler Measurements and Calculations

MV E max dipesh 113.6 cm/sec

MV A max dipesh 76.6 cm/sec



MV E/A 1.5 



MV dec time 0.19 sec

## 2017-03-06 NOTE — DIAGNOSTIC IMAGING REPORT
CHEST ONE VIEW PORTABLE



HISTORY:      hypoxia



COMPARISON: Chest 3/5/2017.



FINDINGS: There are low lung volumes. The heart remains enlarged. Mild

interstitial pulmonary edema and trace bilateral pleural effusions persist.

Bibasilar densities remain unchanged. This may represent atelectasis. Right

jugular central venous catheter terminates in the proximal SVC.



IMPRESSION:

No change in the cardiomegaly and suspected pulmonary edema.







Electronically signed by:  Samy Serrano M.D.

3/6/2017 7:24 AM



Dictated Date/Time:  3/6/2017 7:23 AM

## 2017-03-06 NOTE — PROGRESS NOTE
Medicine Progress Note


Date & Time of Visit:


Mar 6, 2017 at 10:14.


Subjective


seen sitting up in bed


in good spirits, alert, oriented


denies dyspnea, chest pain, dizziness, nausea, headache


no cough, sputum, abdominal pain, diarrhea


poor recollection of events yesterday





Objective





Last 8 Hrs








  Date Time  Temp Pulse Resp B/P Pulse Ox O2 Delivery O2 Flow Rate FiO2


 


3/6/17 06:00  64 17 113/70 92 Nasal Cannula 2.0 


 


3/6/17 04:00      Nasal Cannula 2.0 


 


3/6/17 04:00 37.2 65 14 114/75 92 Nasal Cannula 2.0 


 


3/6/17 03:00  66 16 113/70 90 Nasal Cannula 2.0 








Physical Exam:


General- oriented x 3, not in distress, speaks in sentences with no effort





Eyes- anicteric





ENT- oropharynx clear





Neck- (+) IJ central line right side: no swelling, bleeding





Lungs- clear breath sounds bilaterally, no rales/wheezes





Heart- regular rhythm; no murmurs





Abdomen- normal bowel sounds, soft, nontender





Extremities- no pretibial edema, no calf tenderness\





Neuro- alert, oriented x 3; no gross focal neuro deficits


Skin- warm & dry


Laboratory Results:





Last 24 Hours








Test


  3/5/17


10:50 3/5/17


13:59 3/5/17


15:53 3/5/17


16:29


 


White Blood Count 12.97 K/uL    


 


Red Blood Count 4.55 M/uL    


 


Hemoglobin 14.2 g/dL    


 


Hematocrit 41.3 %    


 


Mean Corpuscular Volume 90.8 fL    


 


Mean Corpuscular Hemoglobin 31.2 pg    


 


Mean Corpuscular Hemoglobin


Concent 34.4 g/dl 


  


  


  


 


 


Platelet Count 220 K/uL    


 


Mean Platelet Volume 9.4 fL    


 


Neutrophils (%) (Auto) 83.3 %    


 


Lymphocytes (%) (Auto) 9.6 %    


 


Monocytes (%) (Auto) 5.9 %    


 


Eosinophils (%) (Auto) 0.8 %    


 


Basophils (%) (Auto) 0.1 %    


 


Neutrophils # (Auto) 10.81 K/uL    


 


Lymphocytes # (Auto) 1.25 K/uL    


 


Monocytes # (Auto) 0.76 K/uL    


 


Eosinophils # (Auto) 0.10 K/uL    


 


Basophils # (Auto) 0.01 K/uL    


 


RDW Standard Deviation 53.5 fL    


 


RDW Coefficient of Variation 16.3 %    


 


Immature Granulocyte % (Auto) 0.3 %    


 


Immature Granulocyte # (Auto) 0.04 K/uL    


 


Prothrombin Time 11.2 SECONDS    


 


Prothromb Time International


Ratio 1.0 


  


  


  


 


 


Sodium Level 131 mmol/L   130 mmol/L  


 


Potassium Level 4.1 mmol/L   4.1 mmol/L  


 


Chloride Level 91 mmol/L   91 mmol/L  


 


Carbon Dioxide Level 19 mmol/L   15 mmol/L  


 


Anion Gap 21.0 mmol/L   24.0 mmol/L  


 


Blood Urea Nitrogen 106 mg/dl   108 mg/dl  


 


Creatinine 12.00 mg/dl   11.00 mg/dl  


 


Estimated GFR (


American) 4.8 


  


  5.3 


  


 


 


Estimated GFR (Non-


American 4.2 


  


  4.6 


  


 


 


BUN/Creatinine Ratio 8.8   9.8  


 


Random Glucose 85 mg/dl   103 mg/dl  


 


Calcium Level 6.6 mg/dl   6.7 mg/dl  


 


Total Bilirubin 0.6 mg/dl   0.7 mg/dl  


 


Aspartate Amino Transf


(AST/SGOT) 70 U/L 


  


  91 U/L 


  


 


 


Alanine Aminotransferase


(ALT/SGPT) 39 U/L 


  


  39 U/L 


  


 


 


Alkaline Phosphatase 55 U/L   62 U/L  


 


Total Creatine Kinase 3280 U/L    


 


Troponin I < 0.015 ng/ml    


 


Total Protein 7.6 gm/dl   7.6 gm/dl  


 


Albumin 3.2 gm/dl   2.9 gm/dl  


 


Globulin 4.4 gm/dl   4.7 gm/dl  


 


Albumin/Globulin Ratio 0.7   0.6  


 


25-Hydroxy Vitamin D Total 4.4 ng/ml    


 


Procalcitonin 11.94 ng/mL    


 


Ethyl Alcohol mg/dL < 3.0 mg/dl    


 


Lactic Acid Level  1.3 mmol/L   


 


Ionized Calcium  0.79 mmol/l   0.79 mmol/l 


 


Parathyroid Hormone (Intact)  275.6 pg/mL   


 


Est Creatinine Clear Calc


Drug Dose 


  


  9.2 ml/min 


  


 


 


Venous Blood pH    7.19 


 


Venous Blood Partial Pressure


CO2 


  


  


  49 mmHg 


 


 


Venous Blood Partial Pressure


O2 


  


  


  68 mmHg 


 


 


Venous Blood HCO3    18 mmol/L 


 


Venous Blood Oxygen Saturation    89.9 % 


 


Venous Blood Base Excess    -9.9 mmol/L 


 


Phosphorus Level    9.8 mg/dl 


 


C-Reactive Protein    29.20 mg/dl 


 


Pro-B-Type Natriuretic Peptide    2632 pg/ml 


 


Thyroid Stimulating Hormone


(TSH) 


  


  


  < 0.005 uIu/ml 


 














Test


  3/5/17


16:36 3/5/17


20:37 3/6/17


00:09 3/6/17


05:26


 


Blood Gas Sample Site Art Line    


 


Bedside Blood Gas pH (LAB) 7.15    


 


Bedside Blood Gas pCO2 (LAB) 52 mmHg    


 


Bedside Blood Gas pO2 (LAB) 65 mmHg    


 


Bedside Blood Gas HCO3 (LAB) 18 meq/L    


 


Bedside Blood Gas Total CO2 20 mEq/l    


 


Bedside Blood Gas Base Excess


(LAB) -11.0 meq/L 


  


  


  


 


 


Bedside Blood Gas O2


Saturation 85.0 % 


  


  


  


 


 


Maciej Test NA    


 


Oxygen Delivery Device SimpleMask    


 


Sodium Level  129 mmol/L   134 mmol/L 


 


Potassium Level  4.4 mmol/L   3.9 mmol/L 


 


Chloride Level  92 mmol/L   98 mmol/L 


 


Carbon Dioxide Level  18 mmol/L   20 mmol/L 


 


Anion Gap  19.0 mmol/L   16.0 mmol/L 


 


Blood Urea Nitrogen  105 mg/dl   99 mg/dl 


 


Creatinine  9.70 mg/dl   6.90 mg/dl 


 


Est Creatinine Clear Calc


Drug Dose 


  10.4 ml/min 


  


  17.6 ml/min 


 


 


Estimated GFR (


American) 


  6.2 


  


  9.4 


 


 


Estimated GFR (Non-


American 


  5.4 


  


  8.1 


 


 


BUN/Creatinine Ratio  10.8   14.3 


 


Random Glucose  154 mg/dl   138 mg/dl 


 


Calcium Level  6.7 mg/dl   6.6 mg/dl 


 


Phosphorus Level  8.9 mg/dl   7.2 mg/dl 


 


Bedside Glucose   140 mg/dl  


 


White Blood Count    11.51 K/uL 


 


Red Blood Count    4.86 M/uL 


 


Hemoglobin    14.9 g/dL 


 


Hematocrit    43.9 % 


 


Mean Corpuscular Volume    90.3 fL 


 


Mean Corpuscular Hemoglobin    30.7 pg 


 


Mean Corpuscular Hemoglobin


Concent 


  


  


  33.9 g/dl 


 


 


RDW Standard Deviation    53.4 fL 


 


RDW Coefficient of Variation    16.1 % 


 


Platelet Count    213 K/uL 


 


Mean Platelet Volume    9.9 fL 


 


Magnesium Level    2.2 mg/dl 


 


Total Creatine Kinase    2852 U/L 


 


Thyroid Stimulating Hormone


(TSH) 


  


  


  < 0.005 uIu/ml 


 


 


Free Thyroxine    1.07 ng/dl 














Test


  3/6/17


05:30 


  


  


 


 


Bedside Glucose 128 mg/dl    








 








 Date/Time


Source Procedure


Growth Status


 


 


 3/5/17 13:59


Blood Blood Culture


Pending Received


 


 3/5/17 10:50


Blood Blood Culture


Pending Received











Assessment & Plan


SHOCK 


- likely Hypovolemic from Poor Oral Intake, Concurrent use of Diuretics


   improving


   Levophed being weaned off


   continue IV fluids


- Adrenal Insufficiency


   on chronic Cortef, s/p Pituitary Resection


   on stress dose Hydrocortisone


- r/o Septic Shock


   ff up cultures


   on empiric Ceftri  + Doxy





METABOLIC ENCEPHALOPATHY


LIKELY TOXIC-METABOLIC FROM UREMIA, HYPOVOLEMIA, POSSIBLE SEPSIS, CONCOMITANT 

PERCOCET USE


- resolved


- CT head negative


- was taking Percocet for recent joint injection


- management of above  etiologies noted in this progress note








ACUTE RENAL FAILURE ON CKD 3


HYPOCALCEMIA


ELEVATED CPK


- likely PreRenal


- Renal US: no obtsruction


- IV fluids being given


  Ca supplemented as well as Vit D 


  hold Lisinopril/HCTZ


- crea improving


  good urine output


  CPK improving


- Nephro on board





HX PITUITARY ADENOMA S/P RESECTION 


- stress dose steroids as above


- continue testosterone, Humatrope, levothyroxine





HX SEIZURES


- continue gabapentin however renally dosed





HTN 


- currently hypotensive


- outpatient regimen: benazepril/HCTZ, atenolol 





DVT PROPHYLAXIS


- SQ Heparin 





DISPO


pending


lives with wife at home


Current Inpatient Medications:





 Current Inpatient Medications








 Medications


  (Trade)  Dose


 Ordered  Sig/Joce


 Route  Start Time


 Stop Time Status Last Admin


Dose Admin


 


 Ceftriaxone


 Sodium 1 gm/


 Dextrose  50 ml @ 


 100 mls/hr  Q24H


 IV  3/5/17 15:00


 3/12/17 14:59  3/5/17 17:44


100 MLS/HR


 


 Doxycycline


 Hyclate/Dextrose


  (Vibramycin IV/


 D5 100ml)  110 ml @ 


 50 mls/hr  Q12H


 IV  3/5/17 15:00


 3/12/17 14:59  3/6/17 04:20


50 MLS/HR


 


 Heparin Sodium


  (Porcine) 5000


 unit  5,000 unit  Q12H


 SQ  3/5/17 21:00


 4/4/17 20:59  3/6/17 08:59


5,000 UNIT


 


 Hydrocortisone


 Sodium Succinate


 50 mg/Syringe  1 ml @ 4


 mls/min  Q6H


 IV  3/5/17 18:30


 4/4/17 18:29  3/6/17 06:23


4 MLS/MIN


 


 Sodium Chloride


  (Nss 1000ml)  1,000 ml @ 


 125 mls/hr  Q8H


 IV  3/5/17 12:45


 4/4/17 12:44  3/6/17 08:53


125 MLS/HR


 


 Miscellaneous


 Information


  (Order Awaiting


 Action)  1 ea  QS


 N/A  3/5/17 16:00


 4/4/17 15:59   


 


 


 Testosterone


 Cypionate 100 mg  100 mg  We@0900


 IM  3/8/17 09:00


 4/7/17 08:59   


 


 


 Norepinephrine


 Bitartrate/


 Dextrose


  (Levophed Inj/


 D5W 500ml)  508 ml @ 0


 mls/hr  Q0M PRN


 IV  3/5/17 15:00


 4/4/17 14:59  3/6/17 08:58


38.6 MLS/HR


 


 Ergocalciferol


  (Vitamin D Cap)  50,000


 interunit  DAILY@0900


 PO  3/5/17 17:00


 4/4/17 16:59  3/6/17 08:52


50,000 INTERUNIT


 


 Levothyroxine


 Sodium


  (Synthroid Tab)  300 mcg  DAILYBB


 PO  3/6/17 10:00


 4/5/17 09:59  3/6/17 10:03


300 MCG


 


 Diazepam


  (Valium Tab)  1 mg  BID


 PO  3/6/17 21:00


 4/5/17 20:59   


 


 


 Docusate Sodium


  (coLACE CAP)  100 mg  BID


 PO  3/6/17 21:00


 4/5/17 20:59   


 


 


 Amitriptyline HCl


  (Elavil Tab)  10 mg  HS


 PO  3/6/17 21:00


 4/5/17 20:59   


 


 


 Sertraline HCl


  (Zoloft Tab)  50 mg  BID


 PO  3/6/17 21:00


 4/5/17 20:59   


 


 


 Pantoprazole


 Sodium


  (Protonix Tab)  40 mg  DAILY


 PO  3/7/17 09:00


 4/6/17 08:59   


 


 


 Lorazepam


  (Ativan Tab)  1 mg  BID


 PO  3/6/17 21:00


 4/5/17 20:59 UNV

## 2017-03-06 NOTE — PROGRESS NOTE
Progress Note


Date of Service


Mar 6, 2017.





Progress Note


Intensivist:





Discussion with patient and wife confirms that he does not take all the 

medications listed on his med rec at admission.  He is not taking prilosec, 

elavil, or sertraline.  He has had "issues" with ambien to the point that his 

wife has asked him not to take it but he does, however, not every night.  He 

admits to shopping around for doctors who will prescribe him what he thinks he 

needs for pain.  He was insistent this morning that he does not have a problem 

managing his medications but seems more receptive to the idea he needs some 

help with addiction.  He is on probation for DUI x3 and admits to sipping 2-4 

drinks per night but not every night.

## 2017-03-06 NOTE — NEPHROLOGY PROGRESS NOTE
Nephrology Progress Note


Date of Service:


Mar 6, 2017.


Subjective


58 yo male with atn/volume depletion with overmedication of pain medications 

contributing to hypotension and hypocalcemia with hyperphosphatemia.  pts 

mental status much improved. urinating well. pt is hungry and thirsty.  still 

requiring pressors.





Objective











  Date Time  Temp Pulse Resp B/P Pulse Ox O2 Delivery O2 Flow Rate FiO2


 


3/6/17 06:00  64 17 113/70 92 Nasal Cannula 2.0 


 


3/6/17 04:00      Nasal Cannula 2.0 


 


3/6/17 04:00 37.2 65 14 114/75 92 Nasal Cannula 2.0 


 


3/6/17 03:00  66 16 113/70 90 Nasal Cannula 2.0 


 


3/6/17 02:00  65 15 120/77 97 Nasal Cannula 2.0 


 


3/6/17 00:16  72   97  6.0 


 


3/6/17 00:15      CPAP  


 


3/6/17 00:00 37.0 68 15 114/ 96 CPAP  


 


3/5/17 22:00  72 14 116/70 96 CPAP  


 


3/5/17 21:00  74 16 119/76 95 CPAP  


 


3/5/17 20:04  75   98  6.0 


 


3/5/17 20:00      CPAP  


 


3/5/17 20:00 36.9 75 15 122/68 98 CPAP  


 


3/5/17 18:00 37.1 80 16 107/67 94  6.0 


 


3/5/17 16:00     94 Mask 6.0 


 


3/5/17 15:00  68  91/46    


 


3/5/17 14:30  76  101/52    


 


3/5/17 14:15  68  86/50    


 


3/5/17 14:15 36.3 71 22 87/44 91 Nasal Cannula 4.0 


 


3/5/17 14:01 37.0 68 18 89/48 96   


 


3/5/17 14:00  70  87/44    


 


3/5/17 13:40  71  82/49    


 


3/5/17 13:36  70  64/29    





    63/32    


 


3/5/17 13:35 36.3 69 19 57/25 95 Nasal Cannula 3.0 





    58/27    


 


3/5/17 12:56  68 18 89/48 96 Nasal Cannula 3.0 


 


3/5/17 11:26  62 20 79/44 98   


 


3/5/17 11:25  62 20 80/35 98 Nasal Cannula 4.0 


 


3/5/17 11:07  63 16 68/39 95 Nasal Cannula 3.0 


 


3/5/17 10:41  63  66/34   4.0 


 


3/5/17 09:40  65      


 


3/5/17 09:31     97  2.0 


 


3/5/17 09:17 37.0 73 20  87 Room Air  








Physical Exam:


General-aaox3, obese


Eyes-no scleral icterus


ENT-mmm


Neck-supple


Lungs-cta anteriorly


Heart-rrr


Abdomen-bs+ s/nt/nd 


Extremities-no c/c/e


Neuro-nonfocal





 Current Inpatient Medications








 Medications


  (Trade)  Dose


 Ordered  Sig/Joce


 Route  Start Time


 Stop Time Status Last Admin


Dose Admin


 


 Ceftriaxone


 Sodium 1 gm/


 Dextrose  50 ml @ 


 100 mls/hr  Q24H


 IV  3/5/17 15:00


 3/12/17 14:59  3/5/17 17:44


100 MLS/HR


 


 Doxycycline


 Hyclate/Dextrose


  (Vibramycin IV/


 D5 100ml)  110 ml @ 


 50 mls/hr  Q12H


 IV  3/5/17 15:00


 3/12/17 14:59  3/6/17 04:20


50 MLS/HR


 


 Heparin Sodium


  (Porcine) 5000


 unit  5,000 unit  Q12H


 SQ  3/5/17 21:00


 4/4/17 20:59  3/5/17 21:50


5,000 UNIT


 


 Hydrocortisone


 Sodium Succinate


 50 mg/Syringe  1 ml @ 4


 mls/min  Q6H


 IV  3/5/17 18:30


 4/4/17 18:29  3/6/17 06:23


4 MLS/MIN


 


 Sodium Chloride


  (Nss 1000ml)  1,000 ml @ 


 125 mls/hr  Q8H


 IV  3/5/17 12:45


 4/4/17 12:44  3/5/17 23:03


125 MLS/HR


 


 Miscellaneous


 Information


  (Order Awaiting


 Action)  1 ea  QS


 N/A  3/5/17 16:00


 4/4/17 15:59   


 


 


 Testosterone


 Cypionate 100 mg  100 mg  We@0900


 IM  3/8/17 09:00


 4/7/17 08:59   


 


 


 Norepinephrine


 Bitartrate/


 Dextrose


  (Levophed Inj/


 D5W 500ml)  508 ml @ 0


 mls/hr  Q0M PRN


 IV  3/5/17 15:00


 4/4/17 14:59  3/5/17 23:04


73.4 MLS/HR


 


 Ergocalciferol


 17485 interunit  50,000


 interunit  DAILY@0900


 PO  3/5/17 17:00


 4/4/17 16:59  3/5/17 17:45


50,000 INTERUNIT


 


 Levothyroxine


 Sodium 150 mcg/


 Syringe  7.5 ml @ 2


 mls/min  DAILY@09


 IV  3/6/17 09:00


 4/5/17 08:59   


 


 


 Calcium Gluconate/


 Sodium Chloride


  (Calcium


 Gluconate 10%/Nss


 50ml)  70 ml @ 


 240 mls/hr  NOW  STAT


 IV  3/6/17 07:49


 3/6/17 08:06 UNV  


 











Last 24 Hours








Test


  3/5/17


09:28 3/5/17


10:10 3/5/17


10:50 3/5/17


13:59


 


Bedside Glucose 84 mg/dl    


 


Urine Opiates Screen  POS   


 


Urine Methadone, Qualitative  NEG   


 


Urine Barbiturates  NEG   


 


Urine Phencyclidine (PCP)


Level 


  NEG 


  


  


 


 


Ur


Amphetamine/Methamphetamine 


  NEG 


  


  


 


 


MDMA (Ecstasy) Screen  NEG   


 


Urine Benzodiazepines Screen  NEG   


 


Urine Cocaine Metabolite  NEG   


 


Urine Marijuana (THC)  NEG   


 


White Blood Count   12.97 K/uL  


 


Red Blood Count   4.55 M/uL  


 


Hemoglobin   14.2 g/dL  


 


Hematocrit   41.3 %  


 


Mean Corpuscular Volume   90.8 fL  


 


Mean Corpuscular Hemoglobin   31.2 pg  


 


Mean Corpuscular Hemoglobin


Concent 


  


  34.4 g/dl 


  


 


 


Platelet Count   220 K/uL  


 


Mean Platelet Volume   9.4 fL  


 


Neutrophils (%) (Auto)   83.3 %  


 


Lymphocytes (%) (Auto)   9.6 %  


 


Monocytes (%) (Auto)   5.9 %  


 


Eosinophils (%) (Auto)   0.8 %  


 


Basophils (%) (Auto)   0.1 %  


 


Neutrophils # (Auto)   10.81 K/uL  


 


Lymphocytes # (Auto)   1.25 K/uL  


 


Monocytes # (Auto)   0.76 K/uL  


 


Eosinophils # (Auto)   0.10 K/uL  


 


Basophils # (Auto)   0.01 K/uL  


 


RDW Standard Deviation   53.5 fL  


 


RDW Coefficient of Variation   16.3 %  


 


Immature Granulocyte % (Auto)   0.3 %  


 


Immature Granulocyte # (Auto)   0.04 K/uL  


 


Prothrombin Time   11.2 SECONDS  


 


Prothromb Time International


Ratio 


  


  1.0 


  


 


 


Sodium Level   131 mmol/L  


 


Potassium Level   4.1 mmol/L  


 


Chloride Level   91 mmol/L  


 


Carbon Dioxide Level   19 mmol/L  


 


Anion Gap   21.0 mmol/L  


 


Blood Urea Nitrogen   106 mg/dl  


 


Creatinine   12.00 mg/dl  


 


Estimated GFR (


American) 


  


  4.8 


  


 


 


Estimated GFR (Non-


American 


  


  4.2 


  


 


 


BUN/Creatinine Ratio   8.8  


 


Random Glucose   85 mg/dl  


 


Calcium Level   6.6 mg/dl  


 


Total Bilirubin   0.6 mg/dl  


 


Aspartate Amino Transf


(AST/SGOT) 


  


  70 U/L 


  


 


 


Alanine Aminotransferase


(ALT/SGPT) 


  


  39 U/L 


  


 


 


Alkaline Phosphatase   55 U/L  


 


Total Creatine Kinase   3280 U/L  


 


Troponin I   < 0.015 ng/ml  


 


Total Protein   7.6 gm/dl  


 


Albumin   3.2 gm/dl  


 


Globulin   4.4 gm/dl  


 


Albumin/Globulin Ratio   0.7  


 


25-Hydroxy Vitamin D Total   4.4 ng/ml  


 


Procalcitonin   11.94 ng/mL  


 


Ethyl Alcohol mg/dL   < 3.0 mg/dl  


 


Lactic Acid Level    1.3 mmol/L 


 


Ionized Calcium    0.79 mmol/l 


 


Parathyroid Hormone (Intact)    275.6 pg/mL 














Test


  3/5/17


15:53 3/5/17


16:29 3/5/17


16:36 3/5/17


20:37


 


Sodium Level 130 mmol/L    129 mmol/L 


 


Potassium Level 4.1 mmol/L    4.4 mmol/L 


 


Chloride Level 91 mmol/L    92 mmol/L 


 


Carbon Dioxide Level 15 mmol/L    18 mmol/L 


 


Anion Gap 24.0 mmol/L    19.0 mmol/L 


 


Blood Urea Nitrogen 108 mg/dl    105 mg/dl 


 


Creatinine 11.00 mg/dl    9.70 mg/dl 


 


Est Creatinine Clear Calc


Drug Dose 9.2 ml/min 


  


  


  10.4 ml/min 


 


 


Estimated GFR (


American) 5.3 


  


  


  6.2 


 


 


Estimated GFR (Non-


American 4.6 


  


  


  5.4 


 


 


BUN/Creatinine Ratio 9.8    10.8 


 


Random Glucose 103 mg/dl    154 mg/dl 


 


Calcium Level 6.7 mg/dl    6.7 mg/dl 


 


Total Bilirubin 0.7 mg/dl    


 


Aspartate Amino Transf


(AST/SGOT) 91 U/L 


  


  


  


 


 


Alanine Aminotransferase


(ALT/SGPT) 39 U/L 


  


  


  


 


 


Alkaline Phosphatase 62 U/L    


 


Total Protein 7.6 gm/dl    


 


Albumin 2.9 gm/dl    


 


Globulin 4.7 gm/dl    


 


Albumin/Globulin Ratio 0.6    


 


Venous Blood pH  7.19   


 


Venous Blood Partial Pressure


CO2 


  49 mmHg 


  


  


 


 


Venous Blood Partial Pressure


O2 


  68 mmHg 


  


  


 


 


Venous Blood HCO3  18 mmol/L   


 


Venous Blood Oxygen Saturation  89.9 %   


 


Venous Blood Base Excess  -9.9 mmol/L   


 


Ionized Calcium  0.79 mmol/l   


 


Phosphorus Level  9.8 mg/dl   8.9 mg/dl 


 


C-Reactive Protein  29.20 mg/dl   


 


Pro-B-Type Natriuretic Peptide  2632 pg/ml   


 


Thyroid Stimulating Hormone


(TSH) 


  < 0.005 uIu/ml 


  


  


 


 


Blood Gas Sample Site   Art Line  


 


Bedside Blood Gas pH (LAB)   7.15  


 


Bedside Blood Gas pCO2 (LAB)   52 mmHg  


 


Bedside Blood Gas pO2 (LAB)   65 mmHg  


 


Bedside Blood Gas HCO3 (LAB)   18 meq/L  


 


Bedside Blood Gas Total CO2   20 mEq/l  


 


Bedside Blood Gas Base Excess


(LAB) 


  


  -11.0 meq/L 


  


 


 


Bedside Blood Gas O2


Saturation 


  


  85.0 % 


  


 


 


Maciej Test   NA  


 


Oxygen Delivery Device   SimpleMask  














Test


  3/6/17


00:09 3/6/17


05:26 3/6/17


05:30 


 


 


Bedside Glucose 140 mg/dl   128 mg/dl  


 


White Blood Count  11.51 K/uL   


 


Red Blood Count  4.86 M/uL   


 


Hemoglobin  14.9 g/dL   


 


Hematocrit  43.9 %   


 


Mean Corpuscular Volume  90.3 fL   


 


Mean Corpuscular Hemoglobin  30.7 pg   


 


Mean Corpuscular Hemoglobin


Concent 


  33.9 g/dl 


  


  


 


 


RDW Standard Deviation  53.4 fL   


 


RDW Coefficient of Variation  16.1 %   


 


Platelet Count  213 K/uL   


 


Mean Platelet Volume  9.9 fL   


 


Sodium Level  134 mmol/L   


 


Potassium Level  3.9 mmol/L   


 


Chloride Level  98 mmol/L   


 


Carbon Dioxide Level  20 mmol/L   


 


Anion Gap  16.0 mmol/L   


 


Blood Urea Nitrogen  99 mg/dl   


 


Creatinine  6.90 mg/dl   


 


Est Creatinine Clear Calc


Drug Dose 


  17.6 ml/min 


  


  


 


 


Estimated GFR (


American) 


  9.4 


  


  


 


 


Estimated GFR (Non-


American 


  8.1 


  


  


 


 


BUN/Creatinine Ratio  14.3   


 


Random Glucose  138 mg/dl   


 


Calcium Level  6.6 mg/dl   


 


Phosphorus Level  7.2 mg/dl   


 


Magnesium Level  2.2 mg/dl   


 


Total Creatine Kinase  2852 U/L   


 


Thyroid Stimulating Hormone


(TSH) 


  < 0.005 uIu/ml 


  


  


 


 


Free Thyroxine  1.07 ng/dl   








 








 Date/Time


Source Procedure


Growth Status


 


 


 3/5/17 13:59


Blood Blood Culture


Pending Received


 


 3/5/17 10:50


Blood Blood Culture


Pending Received











Assessment & Plan


YI-non-oliguric-creatinine improved quickly indicating more likely volume 

depletion rather than atn.  continue current iv fluids since tolerating them 

well.  





Hypocalcemia-on vitamin d daily for low vitamin d levels. has also been given 

iv calcium yesterday and will plan on two grams of calcium gluconate again this 

morning. low secondary to high phos and phos levels are improving as kidney 

function improves. hold on starting phosphate binders.

## 2017-03-06 NOTE — CRITICAL CARE PROGRESS NOTE
Critical Care Progress Note


Date of Service


Mar 6, 2017.





ICU Day


ICU Day Number:  2





Attending


Dr. Toro





Subjective


58 y/o M admitted to ICU after he presented with confusion which started 2 days 

PTA after overdosing on Percocet prescribed for knee pain.


was hypotensive on presentation with Systolic in 60s on arrival and was had YI 

with a Creatinine of 12.  was treated with Narcan X2 in ER.


Was admitted to ICU and treated with IVF and Levophed to maintain BP. 

Nephrology consulted for YI








Alert and oriented today. requests a drink and complains of dry mouth. also c/o 

pain down his back shooting down both legs.


denies any CP/SOB/palpitations. denies any coughing, fevers/chills.





Objective


GENERAL: Patient is in no acute distress, obese


HEENT: No acute trauma, normocephalic atraumatic, mucous membranes moist, no 

nasal congestion, no scleral icterus, right IJ in place


NECK: No stridor, no adenopathy, no meningismus, trachea is midline.


LUNGS: Clear to auscultation bilaterally, no wheeze, no rhonchi, breath sounds 

equal.


HEART: Without murmurs gallops or rubs, regular rate and rhythm.


ABDOMEN: Soft, nontender, bowel sounds positive, no hernias, no peritonitis.


EXTREMITIES: No cyanosis or edema, full range of motion of all the joints 

without pain or difficulty, no signs for acute trauma.


NEUROLOGIC: Oriented x 3, no acute motor or sensory deficits, no focal weakness.


SKIN: No rash, no jaundice, no diaphoresis.





Assessment & Plan


58 y/o M admitted to ICU after he presented with confusion which started 2 days 

PTA after overdosing on Percocet prescribed for knee pain.


was hypotensive on presentation with Systolic in 60s on arrival and was had YI 

with a Creatinine of 12.  was treated with Narcan X2 in ER.





Neuro


-  Metabolic encephalopathy likely sec to opiates - resolved





Chronic alcohol abuse/ sedatives  : No signs of withdrawal for now


- Monitor for withdrawal


- Valium 1mg BID( home dose 2 mg BID)


- Lorazepam 1 mg BID





Renal:


- YI on CKD stage 3: creatine at baseline 1.5, on arrival 12, today at 6.9


- Nephrology consult- appreciate input


- Continue IVF





 Electrolytes:


- Hypocalcemia:  Ca at 6.6 sec to increased phosphates,  2 g calcium gluconate 


- Hyperphosphatemia: phos at 7.2 from 9.8 on presentation


- PRP/phos/Ca later today





CVS: 


Hypotension: BP ranging 110s/70s


- Continue IVF and Levophed


- Home benazepril/HCTZ and atenolol held


- Echo 3/5:   


  *  The left ventricle is normal in size.


  *  The LV wall motion is gross normal on limited evaluation.


  *  No regional wall motion abnormalities noted.


  *  The left ventricular ejection fraction is grossly normal, >65%


  *  No hemodynamically significant valvular aortic stenosis.


  *  There is no mitral valve stenosis.


  *  There is no pericardial effusion.





Resp:


- Currently on 2L NC


- CXR: No change in the cardiomegaly and suspected pulmonary edema.


- Possible infiltrative process left lung:  Rocephin day 2, Doxycycline day 2





GI/FEN:


- Diet ordered


- GERD: pantoprazole





Endocrine:


Panhypopituitarism s/p pituitary adenoma resection


- hydrocortisone 50 mg every 6 hours


- somatropin( to be brought in by his wife)


Hypothyroidism :


- levothyroxine 300 g by mouth daily


Low testosterone:


 - testosterone injections





Psych:


Depression:


- Continue amitriptyline and Zoloft





Chronic pain:


- Cymbalta currently held due to renal function


- will considering restarting gabapentin later today


- Morphine 2 mg IV 





Heme:


DVT prophylaxis:  Heparin SQ





Vascular access: Right IJ placed 03/05/2017





Level IV: full resuscitation, no cardioversion





Disposition: In ICU, Monitor for withdrawal, Monitor Cr and BP





Resident Physician Supervision Note:


I interviewed and examined the patient. Discussed with Dr. Alatorre and agree 

with findings and plan as documented in the note. Any exceptions or 

clarifications are listed here:





The patient's care was discussed in detail on multidisciplinary rounds today.  

He is more awake than yesterday but is becoming more confused as well.  He has 

tried to get out of bed and per his RN, Cindy, is concerned that if she doesn'

t show him how to get the side rails on his bed down, he could perish in a 

fire.  He denies recent alcohol use.  I have reviewed VS, meds, labs, echo 

report, cxr and micro data.  The impression and plan are well documented above.

  Additionally, he has mild rhabdo, his CPK is 2852 and improving.  He is 

Vitamin D deficient and is being supplemented.  I have added MVI, thiamine and 

folate to his medication regimen.  He is at risk for withdrawal from opioids, 

benzos and gabapentin and possibly alcohol.  We will reassess his mental status 

throughout the day and consider alcohol withdrawal prevention using ativan 

until his creatinine clearance has improved more significantly.  





Documented By:  Patito Toro





Consults & Procedures


Consultants:


Nephrology





Data


Medications:





 Current Inpatient Medications








 Medications


  (Trade)  Dose


 Ordered  Sig/Joce


 Route  Start Time


 Stop Time Status Last Admin


Dose Admin


 


 Ceftriaxone


 Sodium 1 gm/


 Dextrose  50 ml @ 


 100 mls/hr  Q24H


 IV  3/5/17 15:00


 3/12/17 14:59  3/5/17 17:44


100 MLS/HR


 


 Doxycycline


 Hyclate/Dextrose


  (Vibramycin IV/


 D5 100ml)  110 ml @ 


 50 mls/hr  Q12H


 IV  3/5/17 15:00


 3/12/17 14:59  3/6/17 04:20


50 MLS/HR


 


 Heparin Sodium


  (Porcine) 5000


 unit  5,000 unit  Q12H


 SQ  3/5/17 21:00


 4/4/17 20:59  3/6/17 08:59


5,000 UNIT


 


 Hydrocortisone


 Sodium Succinate


 50 mg/Syringe  1 ml @ 4


 mls/min  Q6H


 IV  3/5/17 18:30


 4/4/17 18:29  3/6/17 06:23


4 MLS/MIN


 


 Sodium Chloride


  (Nss 1000ml)  1,000 ml @ 


 125 mls/hr  Q8H


 IV  3/5/17 12:45


 4/4/17 12:44  3/6/17 08:53


125 MLS/HR


 


 Miscellaneous


 Information


  (Order Awaiting


 Action)  1 ea  QS


 N/A  3/5/17 16:00


 4/4/17 15:59   


 


 


 Testosterone


 Cypionate 100 mg  100 mg  We@0900


 IM  3/8/17 09:00


 4/7/17 08:59   


 


 


 Norepinephrine


 Bitartrate/


 Dextrose


  (Levophed Inj/


 D5W 500ml)  508 ml @ 0


 mls/hr  Q0M PRN


 IV  3/5/17 15:00


 4/4/17 14:59  3/6/17 08:58


38.6 MLS/HR


 


 Ergocalciferol


  (Vitamin D Cap)  50,000


 interunit  DAILY@0900


 PO  3/5/17 17:00


 4/4/17 16:59  3/6/17 08:52


50,000 INTERUNIT


 


 Levothyroxine


 Sodium


  (Synthroid Tab)  300 mcg  DAILYBB


 PO  3/6/17 10:00


 4/5/17 09:59   


 


 


 Diazepam


  (Valium Tab)  1 mg  BID


 PO  3/6/17 21:00


 4/5/17 20:59   


 


 


 Docusate Sodium


  (coLACE CAP)  100 mg  BID


 PO  3/6/17 21:00


 4/5/17 20:59   


 


 


 Amitriptyline HCl


  (Elavil Tab)  10 mg  HS


 PO  3/6/17 21:00


 4/5/17 20:59   


 


 


 Sertraline HCl


  (Zoloft Tab)  50 mg  BID


 PO  3/6/17 21:00


 4/5/17 20:59   


 


 


 Pantoprazole


 Sodium


  (Protonix Tab)  40 mg  DAILY


 PO  3/7/17 09:00


 4/6/17 08:59   


 








I & O:











 24-Hour Column 


 


 3/6/17





 08:00


 


Intake Total 3160 ml


 


Output Total 3600 ml


 


Balance -440 ml








Vital Signs:











  Date Time  Temp Pulse Resp B/P Pulse Ox O2 Delivery O2 Flow Rate FiO2


 


3/6/17 06:00  64 17 113/70 92 Nasal Cannula 2.0 


 


3/6/17 04:00      Nasal Cannula 2.0 


 


3/6/17 04:00 37.2 65 14 114/75 92 Nasal Cannula 2.0 


 


3/6/17 03:00  66 16 113/70 90 Nasal Cannula 2.0 


 


3/6/17 02:00  65 15 120/77 97 Nasal Cannula 2.0 


 


3/6/17 00:16  72   97  6.0 


 


3/6/17 00:15      CPAP  


 


3/6/17 00:00 37.0 68 15 114/ 96 CPAP  


 


3/5/17 22:00  72 14 116/70 96 CPAP  


 


3/5/17 21:00  74 16 119/76 95 CPAP  


 


3/5/17 20:04  75   98  6.0 


 


3/5/17 20:00      CPAP  


 


3/5/17 20:00 36.9 75 15 122/68 98 CPAP  


 


3/5/17 18:00 37.1 80 16 107/67 94  6.0 


 


3/5/17 16:00     94 Mask 6.0 


 


3/5/17 15:00  68  91/46    


 


3/5/17 14:30  76  101/52    


 


3/5/17 14:15  68  86/50    


 


3/5/17 14:15 36.3 71 22 87/44 91 Nasal Cannula 4.0 


 


3/5/17 14:01 37.0 68 18 89/48 96   


 


3/5/17 14:00  70  87/44    


 


3/5/17 13:40  71  82/49    


 


3/5/17 13:36  70  64/29    





    63/32    


 


3/5/17 13:35 36.3 69 19 57/25 95 Nasal Cannula 3.0 





    58/27    


 


3/5/17 12:56  68 18 89/48 96 Nasal Cannula 3.0 


 


3/5/17 11:26  62 20 79/44 98   


 


3/5/17 11:25  62 20 80/35 98 Nasal Cannula 4.0 


 


3/5/17 11:07  63 16 68/39 95 Nasal Cannula 3.0 


 


3/5/17 10:41  63  66/34   4.0 








Laboratory Results:





Last 24 Hours








Test


  3/5/17


10:10 3/5/17


10:50 3/5/17


13:59 3/5/17


15:53


 


Urine Opiates Screen POS    


 


Urine Methadone, Qualitative NEG    


 


Urine Barbiturates NEG    


 


Urine Phencyclidine (PCP)


Level NEG 


  


  


  


 


 


Ur


Amphetamine/Methamphetamine NEG 


  


  


  


 


 


MDMA (Ecstasy) Screen NEG    


 


Urine Benzodiazepines Screen NEG    


 


Urine Cocaine Metabolite NEG    


 


Urine Marijuana (THC) NEG    


 


White Blood Count  12.97 K/uL   


 


Red Blood Count  4.55 M/uL   


 


Hemoglobin  14.2 g/dL   


 


Hematocrit  41.3 %   


 


Mean Corpuscular Volume  90.8 fL   


 


Mean Corpuscular Hemoglobin  31.2 pg   


 


Mean Corpuscular Hemoglobin


Concent 


  34.4 g/dl 


  


  


 


 


Platelet Count  220 K/uL   


 


Mean Platelet Volume  9.4 fL   


 


Neutrophils (%) (Auto)  83.3 %   


 


Lymphocytes (%) (Auto)  9.6 %   


 


Monocytes (%) (Auto)  5.9 %   


 


Eosinophils (%) (Auto)  0.8 %   


 


Basophils (%) (Auto)  0.1 %   


 


Neutrophils # (Auto)  10.81 K/uL   


 


Lymphocytes # (Auto)  1.25 K/uL   


 


Monocytes # (Auto)  0.76 K/uL   


 


Eosinophils # (Auto)  0.10 K/uL   


 


Basophils # (Auto)  0.01 K/uL   


 


RDW Standard Deviation  53.5 fL   


 


RDW Coefficient of Variation  16.3 %   


 


Immature Granulocyte % (Auto)  0.3 %   


 


Immature Granulocyte # (Auto)  0.04 K/uL   


 


Prothrombin Time  11.2 SECONDS   


 


Prothromb Time International


Ratio 


  1.0 


  


  


 


 


Sodium Level  131 mmol/L   130 mmol/L 


 


Potassium Level  4.1 mmol/L   4.1 mmol/L 


 


Chloride Level  91 mmol/L   91 mmol/L 


 


Carbon Dioxide Level  19 mmol/L   15 mmol/L 


 


Anion Gap  21.0 mmol/L   24.0 mmol/L 


 


Blood Urea Nitrogen  106 mg/dl   108 mg/dl 


 


Creatinine  12.00 mg/dl   11.00 mg/dl 


 


Estimated GFR (


American) 


  4.8 


  


  5.3 


 


 


Estimated GFR (Non-


American 


  4.2 


  


  4.6 


 


 


BUN/Creatinine Ratio  8.8   9.8 


 


Random Glucose  85 mg/dl   103 mg/dl 


 


Calcium Level  6.6 mg/dl   6.7 mg/dl 


 


Total Bilirubin  0.6 mg/dl   0.7 mg/dl 


 


Aspartate Amino Transf


(AST/SGOT) 


  70 U/L 


  


  91 U/L 


 


 


Alanine Aminotransferase


(ALT/SGPT) 


  39 U/L 


  


  39 U/L 


 


 


Alkaline Phosphatase  55 U/L   62 U/L 


 


Total Creatine Kinase  3280 U/L   


 


Troponin I  < 0.015 ng/ml   


 


Total Protein  7.6 gm/dl   7.6 gm/dl 


 


Albumin  3.2 gm/dl   2.9 gm/dl 


 


Globulin  4.4 gm/dl   4.7 gm/dl 


 


Albumin/Globulin Ratio  0.7   0.6 


 


25-Hydroxy Vitamin D Total  4.4 ng/ml   


 


Procalcitonin  11.94 ng/mL   


 


Ethyl Alcohol mg/dL  < 3.0 mg/dl   


 


Lactic Acid Level   1.3 mmol/L  


 


Ionized Calcium   0.79 mmol/l  


 


Parathyroid Hormone (Intact)   275.6 pg/mL  


 


Est Creatinine Clear Calc


Drug Dose 


  


  


  9.2 ml/min 


 














Test


  3/5/17


16:29 3/5/17


16:36 3/5/17


20:37 3/6/17


00:09


 


Venous Blood pH 7.19    


 


Venous Blood Partial Pressure


CO2 49 mmHg 


  


  


  


 


 


Venous Blood Partial Pressure


O2 68 mmHg 


  


  


  


 


 


Venous Blood HCO3 18 mmol/L    


 


Venous Blood Oxygen Saturation 89.9 %    


 


Venous Blood Base Excess -9.9 mmol/L    


 


Ionized Calcium 0.79 mmol/l    


 


Phosphorus Level 9.8 mg/dl   8.9 mg/dl  


 


C-Reactive Protein 29.20 mg/dl    


 


Pro-B-Type Natriuretic Peptide 2632 pg/ml    


 


Thyroid Stimulating Hormone


(TSH) < 0.005 uIu/ml 


  


  


  


 


 


Blood Gas Sample Site  Art Line   


 


Bedside Blood Gas pH (LAB)  7.15   


 


Bedside Blood Gas pCO2 (LAB)  52 mmHg   


 


Bedside Blood Gas pO2 (LAB)  65 mmHg   


 


Bedside Blood Gas HCO3 (LAB)  18 meq/L   


 


Bedside Blood Gas Total CO2  20 mEq/l   


 


Bedside Blood Gas Base Excess


(LAB) 


  -11.0 meq/L 


  


  


 


 


Bedside Blood Gas O2


Saturation 


  85.0 % 


  


  


 


 


Maciej Test  NA   


 


Oxygen Delivery Device  SimpleMask   


 


Sodium Level   129 mmol/L  


 


Potassium Level   4.4 mmol/L  


 


Chloride Level   92 mmol/L  


 


Carbon Dioxide Level   18 mmol/L  


 


Anion Gap   19.0 mmol/L  


 


Blood Urea Nitrogen   105 mg/dl  


 


Creatinine   9.70 mg/dl  


 


Est Creatinine Clear Calc


Drug Dose 


  


  10.4 ml/min 


  


 


 


Estimated GFR (


American) 


  


  6.2 


  


 


 


Estimated GFR (Non-


American 


  


  5.4 


  


 


 


BUN/Creatinine Ratio   10.8  


 


Random Glucose   154 mg/dl  


 


Calcium Level   6.7 mg/dl  


 


Bedside Glucose    140 mg/dl 














Test


  3/6/17


05:26 3/6/17


05:30 


  


 


 


White Blood Count 11.51 K/uL    


 


Red Blood Count 4.86 M/uL    


 


Hemoglobin 14.9 g/dL    


 


Hematocrit 43.9 %    


 


Mean Corpuscular Volume 90.3 fL    


 


Mean Corpuscular Hemoglobin 30.7 pg    


 


Mean Corpuscular Hemoglobin


Concent 33.9 g/dl 


  


  


  


 


 


RDW Standard Deviation 53.4 fL    


 


RDW Coefficient of Variation 16.1 %    


 


Platelet Count 213 K/uL    


 


Mean Platelet Volume 9.9 fL    


 


Sodium Level 134 mmol/L    


 


Potassium Level 3.9 mmol/L    


 


Chloride Level 98 mmol/L    


 


Carbon Dioxide Level 20 mmol/L    


 


Anion Gap 16.0 mmol/L    


 


Blood Urea Nitrogen 99 mg/dl    


 


Creatinine 6.90 mg/dl    


 


Est Creatinine Clear Calc


Drug Dose 17.6 ml/min 


  


  


  


 


 


Estimated GFR (


American) 9.4 


  


  


  


 


 


Estimated GFR (Non-


American 8.1 


  


  


  


 


 


BUN/Creatinine Ratio 14.3    


 


Random Glucose 138 mg/dl    


 


Calcium Level 6.6 mg/dl    


 


Phosphorus Level 7.2 mg/dl    


 


Magnesium Level 2.2 mg/dl    


 


Total Creatine Kinase 2852 U/L    


 


Thyroid Stimulating Hormone


(TSH) < 0.005 uIu/ml 


  


  


  


 


 


Free Thyroxine 1.07 ng/dl    


 


Bedside Glucose  128 mg/dl

## 2017-03-07 VITALS — DIASTOLIC BLOOD PRESSURE: 74 MMHG | SYSTOLIC BLOOD PRESSURE: 120 MMHG | HEART RATE: 78 BPM | OXYGEN SATURATION: 96 %

## 2017-03-07 VITALS
DIASTOLIC BLOOD PRESSURE: 66 MMHG | OXYGEN SATURATION: 94 % | HEART RATE: 69 BPM | SYSTOLIC BLOOD PRESSURE: 108 MMHG | TEMPERATURE: 98.6 F

## 2017-03-07 VITALS — SYSTOLIC BLOOD PRESSURE: 101 MMHG | OXYGEN SATURATION: 90 % | DIASTOLIC BLOOD PRESSURE: 59 MMHG | HEART RATE: 72 BPM

## 2017-03-07 VITALS
TEMPERATURE: 97.88 F | SYSTOLIC BLOOD PRESSURE: 142 MMHG | HEART RATE: 77 BPM | OXYGEN SATURATION: 92 % | DIASTOLIC BLOOD PRESSURE: 76 MMHG

## 2017-03-07 VITALS
SYSTOLIC BLOOD PRESSURE: 167 MMHG | DIASTOLIC BLOOD PRESSURE: 90 MMHG | TEMPERATURE: 98.06 F | OXYGEN SATURATION: 100 % | HEART RATE: 74 BPM

## 2017-03-07 VITALS
TEMPERATURE: 98.42 F | DIASTOLIC BLOOD PRESSURE: 81 MMHG | OXYGEN SATURATION: 94 % | HEART RATE: 79 BPM | SYSTOLIC BLOOD PRESSURE: 134 MMHG

## 2017-03-07 VITALS
DIASTOLIC BLOOD PRESSURE: 62 MMHG | HEART RATE: 76 BPM | OXYGEN SATURATION: 94 % | SYSTOLIC BLOOD PRESSURE: 107 MMHG | TEMPERATURE: 98.78 F

## 2017-03-07 VITALS
DIASTOLIC BLOOD PRESSURE: 69 MMHG | OXYGEN SATURATION: 96 % | SYSTOLIC BLOOD PRESSURE: 123 MMHG | TEMPERATURE: 98.24 F | HEART RATE: 77 BPM

## 2017-03-07 VITALS — DIASTOLIC BLOOD PRESSURE: 54 MMHG | OXYGEN SATURATION: 92 % | SYSTOLIC BLOOD PRESSURE: 97 MMHG | HEART RATE: 68 BPM

## 2017-03-07 VITALS
TEMPERATURE: 98.42 F | HEART RATE: 79 BPM | DIASTOLIC BLOOD PRESSURE: 81 MMHG | OXYGEN SATURATION: 94 % | SYSTOLIC BLOOD PRESSURE: 134 MMHG

## 2017-03-07 LAB
ANION GAP SERPL CALC-SCNC: 11 MMOL/L (ref 3–11)
ANION GAP SERPL CALC-SCNC: 12 MMOL/L (ref 3–11)
BASOPHILS # BLD: 0 K/UL (ref 0–0.2)
BASOPHILS NFR BLD: 0 %
BUN SERPL-MCNC: 55 MG/DL (ref 7–18)
BUN SERPL-MCNC: 66 MG/DL (ref 7–18)
BUN/CREAT SERPL: 33 (ref 10–20)
BUN/CREAT SERPL: 39.3 (ref 10–20)
CALCIUM SERPL-MCNC: 6.7 MG/DL (ref 8.5–10.1)
CALCIUM SERPL-MCNC: 7.1 MG/DL (ref 8.5–10.1)
CHLORIDE SERPL-SCNC: 105 MMOL/L (ref 98–107)
CHLORIDE SERPL-SCNC: 107 MMOL/L (ref 98–107)
CO2 SERPL-SCNC: 24 MMOL/L (ref 21–32)
CO2 SERPL-SCNC: 24 MMOL/L (ref 21–32)
COMPLETE: YES
CREAT CL PREDICTED SERPL C-G-VRATE: 61 ML/MIN
CREAT CL PREDICTED SERPL C-G-VRATE: 87.2 ML/MIN
CREAT SERPL-MCNC: 1.4 MG/DL (ref 0.6–1.4)
CREAT SERPL-MCNC: 2 MG/DL (ref 0.6–1.4)
EOSINOPHIL NFR BLD AUTO: 155 K/UL (ref 130–400)
GLUCOSE SERPL-MCNC: 102 MG/DL (ref 70–99)
GLUCOSE SERPL-MCNC: 129 MG/DL (ref 70–99)
HCT VFR BLD CALC: 37.4 % (ref 42–52)
IG%: 0.4 %
IMM GRANULOCYTES NFR BLD AUTO: 10.2 %
LYMPHOCYTES # BLD: 0.73 K/UL (ref 1.2–3.4)
MAGNESIUM SERPL-MCNC: 1.7 MG/DL (ref 1.8–2.4)
MAGNESIUM SERPL-MCNC: 2.1 MG/DL (ref 1.8–2.4)
MCH RBC QN AUTO: 30 PG (ref 25–34)
MCHC RBC AUTO-ENTMCNC: 33.4 G/DL (ref 32–36)
MCV RBC AUTO: 89.9 FL (ref 80–100)
MONOCYTES NFR BLD: 6.8 %
NEUTROPHILS # BLD AUTO: 0 %
NEUTROPHILS NFR BLD AUTO: 82.6 %
PHOSPHATE SERPL-MCNC: 1.5 MG/DL (ref 2.5–4.9)
PHOSPHATE SERPL-MCNC: 2 MG/DL (ref 2.5–4.9)
PMV BLD AUTO: 10.2 FL (ref 7.4–10.4)
POTASSIUM SERPL-SCNC: 2.9 MMOL/L (ref 3.5–5.1)
POTASSIUM SERPL-SCNC: 3 MMOL/L (ref 3.5–5.1)
RBC # BLD AUTO: 4.16 M/UL (ref 4.7–6.1)
SODIUM SERPL-SCNC: 140 MMOL/L (ref 136–145)
SODIUM SERPL-SCNC: 143 MMOL/L (ref 136–145)
WBC # BLD AUTO: 7.17 K/UL (ref 4.8–10.8)

## 2017-03-07 RX ADMIN — PANTOPRAZOLE SCH MG: 40 TABLET, DELAYED RELEASE ORAL at 08:58

## 2017-03-07 RX ADMIN — HYDROCODONE BITARTRATE AND ACETAMINOPHEN PRN TAB: 5; 325 TABLET ORAL at 16:28

## 2017-03-07 RX ADMIN — ERGOCALCIFEROL SCH INTERUNIT: 1.25 CAPSULE, LIQUID FILLED ORAL at 08:58

## 2017-03-07 RX ADMIN — MAGNESIUM SULFATE IN DEXTROSE SCH MLS/HR: 10 INJECTION, SOLUTION INTRAVENOUS at 08:54

## 2017-03-07 RX ADMIN — ALUMINUM ZIRCONIUM TRICHLOROHYDREX GLY SCH EA: 0.2 STICK TOPICAL at 16:00

## 2017-03-07 RX ADMIN — HYDROCORTISONE SODIUM SUCCINATE SCH MLS/MIN: 100 INJECTION, POWDER, FOR SOLUTION INTRAMUSCULAR; INTRAVENOUS at 12:50

## 2017-03-07 RX ADMIN — DOXYCYCLINE SCH MLS/HR: 100 INJECTION, POWDER, LYOPHILIZED, FOR SOLUTION INTRAVENOUS at 03:00

## 2017-03-07 RX ADMIN — HEPARIN SODIUM SCH UNIT: 10000 INJECTION, SOLUTION INTRAVENOUS; SUBCUTANEOUS at 08:58

## 2017-03-07 RX ADMIN — HYDROCORTISONE SODIUM SUCCINATE SCH MLS/MIN: 100 INJECTION, POWDER, FOR SOLUTION INTRAMUSCULAR; INTRAVENOUS at 00:35

## 2017-03-07 RX ADMIN — DOCUSATE SODIUM SCH MG: 100 CAPSULE, LIQUID FILLED ORAL at 20:51

## 2017-03-07 RX ADMIN — ALUMINUM ZIRCONIUM TRICHLOROHYDREX GLY SCH EA: 0.2 STICK TOPICAL at 08:00

## 2017-03-07 RX ADMIN — HYDROCODONE BITARTRATE AND ACETAMINOPHEN PRN TAB: 5; 325 TABLET ORAL at 20:51

## 2017-03-07 RX ADMIN — DOCUSATE SODIUM SCH MG: 100 CAPSULE, LIQUID FILLED ORAL at 08:59

## 2017-03-07 RX ADMIN — NICOTINE SCH PATCH: 21 PATCH, EXTENDED RELEASE TRANSDERMAL at 08:57

## 2017-03-07 RX ADMIN — LORAZEPAM SCH MG: 1 TABLET ORAL at 09:04

## 2017-03-07 RX ADMIN — HYDROCORTISONE SODIUM SUCCINATE SCH MLS/MIN: 100 INJECTION, POWDER, FOR SOLUTION INTRAMUSCULAR; INTRAVENOUS at 20:07

## 2017-03-07 RX ADMIN — HEPARIN SODIUM SCH UNIT: 10000 INJECTION, SOLUTION INTRAVENOUS; SUBCUTANEOUS at 20:55

## 2017-03-07 RX ADMIN — Medication SCH MG: at 10:58

## 2017-03-07 RX ADMIN — LEVOTHYROXINE SODIUM SCH MCG: 150 TABLET ORAL at 06:13

## 2017-03-07 RX ADMIN — MAGNESIUM SULFATE IN DEXTROSE SCH MLS/HR: 10 INJECTION, SOLUTION INTRAVENOUS at 10:59

## 2017-03-07 RX ADMIN — LORAZEPAM SCH MG: 1 TABLET ORAL at 20:51

## 2017-03-07 RX ADMIN — GABAPENTIN SCH MG: 400 CAPSULE ORAL at 11:32

## 2017-03-07 RX ADMIN — MORPHINE SULFATE PRN MG: 2 INJECTION, SOLUTION INTRAMUSCULAR; INTRAVENOUS at 04:20

## 2017-03-07 RX ADMIN — SODIUM CHLORIDE SCH MLS/HR: 900 INJECTION, SOLUTION INTRAVENOUS at 12:52

## 2017-03-07 RX ADMIN — Medication SCH TAB: at 10:58

## 2017-03-07 RX ADMIN — HYDROCORTISONE SODIUM SUCCINATE SCH MLS/MIN: 100 INJECTION, POWDER, FOR SOLUTION INTRAMUSCULAR; INTRAVENOUS at 06:13

## 2017-03-07 RX ADMIN — GABAPENTIN SCH MG: 400 CAPSULE ORAL at 18:28

## 2017-03-07 RX ADMIN — SODIUM CHLORIDE SCH MLS/HR: 900 INJECTION, SOLUTION INTRAVENOUS at 04:45

## 2017-03-07 NOTE — CRITICAL CARE PROGRESS NOTE
Critical Care Progress Note


Date of Service


Mar 7, 2017.





ICU Day


ICU Day Number:  2





Attending


Dr. Toro





Subjective


Feeing better today.


- back and knee pain is well controlled but c/o headache and left great toe 

pain.


denies fevers/chills, CP/SOB but has some cough





Objective


GENERAL: Patient is in no acute distress, obese


HEENT: No acute trauma, normocephalic atraumatic, mucous membranes moist, no 

nasal congestion, no scleral icterus, right IJ in place


NECK: No stridor, no adenopathy, no meningismus, trachea is midline.


LUNGS: Clear to auscultation bilaterally, no wheeze, no rhonchi, breath sounds 

equal.


HEART: Without murmurs gallops or rubs, regular rate and rhythm.


ABDOMEN: Soft, nontender, bowel sounds positive, no hernias, no peritonitis.


EXTREMITIES: No cyanosis or edema, full range of motion of all the joints 

without pain or difficulty, no signs for acute trauma.


NEUROLOGIC: Oriented x 3, no acute motor or sensory deficits, no focal weakness.


SKIN: No rash, no jaundice, no diaphoresis.





Assessment & Plan


56 y/o M admitted to ICU after he presented with confusion which started 2 days 

PTA after overdosing on Percocet prescribed for knee pain.


was hypotensive on presentation with Systolic in 60s on arrival and was had YI 

with a Creatinine of 12. 


He has been getting pain medications for knee pain along with Synvisc 

injections and has been suing excessive amounts of Percocet.


Doesnt take all his home medications as listed - will update list 





Neuro


-  pain control with morphine 1 mg q3h - now dc 


- switched to Norco





Chronic alcohol abuse/ sedatives  : No signs of withdrawal for now


- Monitor for withdrawal, alcohol withdrawal protocol with gabapentin 


- Lorazepam 1 mg BID





Renal:


- YI on CKD stage 3: creatine at baseline 1.5, on arrival 12, today at 2


- Nephrology consult- appreciate input


- IVF





 Electrolytes:


- Hypocalcemia:  Ca at 6.7 


- Hypophosphatemia: phos at 2 from 9.8 on presentation


- Hypokalemia: K at 3 , Kphos 15 mmol


- Hypomagnesemia: Mg at 1.7, 2 g Mgso4 added


- Electrolytes later tonight





CVS: 


Hypotension: BP over 120/80


- Levophed dc, continue IVF


- Home benazepril/HCTZ and atenolol held


- Echo 3/5:   


  *  The left ventricle is normal in size.


  *  The LV wall motion is gross normal on limited evaluation.


  *  No regional wall motion abnormalities noted.


  *  The left ventricular ejection fraction is grossly normal, >65%


  *  No hemodynamically significant valvular aortic stenosis.


  *  There is no mitral valve stenosis.


  *  There is no pericardial effusion.





Resp:


- Currently on Room air


- CXR: No change in the cardiomegaly and suspected pulmonary edema.


- Possible infiltrative process left lung:  Rocephin day 2, Doxycycline day 2 - 

dc today


clinically appears well and CXR doesnt show an infiltrate , monitor clinically 

and  restart abx 





GI/FEN:


- GERD: pantoprazole





Endocrine:


Panhypopituitarism s/p pituitary adenoma resection


- hydrocortisone switched to 25 mg every 6 hours


- somatropin 0.3 SQ


Hypothyroidism :


- levothyroxine 300 g by mouth daily


Low testosterone:


 - testosterone injections





Psych:


Depression:


- denies using amitriptyline and Zoloft at home  





Chronic pain:


- Cymbalta currently held due to renal function





PT/OT


Heme:


DVT prophylaxis:  Heparin SQ





Vascular access: Right IJ placed 03/05/2017, will likely dc later today





Level IV: full resuscitation, no cardioversion





Disposition: transfer to tele





Resident Physician Supervision Note:


I interviewed and examined the patient. Discussed with Dr. Alatorre and agree 

with findings and plan as documented in the note. Any exceptions or 

clarifications are listed here:





The patient's care was discussed in detail on multidisciplinary rounds.  I have 

reviewed the VS, labs, I/O, meds and radiograph from today.  He is more alert 

today and tells me he only takes percocet to get him through his Simvisc 

injections - the last of which was a few weeks ago.  He also admits to overuse 

of ibuprophen in the past and is sketchy about details of his med use.  He has 

never been to a pain clinic.  We are still trying to understand what he 

actually takes at home and Dr. Alatorre will add an addendum to this note after 

going over his meds with him one more time.  Discussed also with pharmacy here 

who is looking into which prescriptions have been filled recently as well.





His antibiotics have been discontinued - no infiltrate on cxr, no fever and 

normal WBC.  He may be starting to produce some sputum - can continue to 

reassess and resume if clinical condition warrants.  IVF were discontinued then 

resumed after reviewing Dr. Zavala's note.  Gabapentin dosing for alcohol 

withdrawal prophylaxis was adjusted upward based on improved Cr clearance.  He 

has had one seizure, 3 months ago, and initially said he takes gabapentin for 

it - now tells me he takes gabapentin for pain and is not on anything for 

seizures.  Taper hydrocortisone - consider changing to po tomorrow.  Hydrocodone

/acetaminophen ordered for pain 5/325mg q4h prn.  PT/OT, keep andrews for now and 

consider d/c central line later today after labs depending on how difficult 

peripheral access is.  Overall, improving.  Still watching for signs or 

symptoms of alcohol, opioid or benzo withdrawal.  He is stable for transfer to 

the floor.





Documented By:  Patito Toro





Consults & Procedures


Consultants:


Nephrology





Data


Medications:





 Current Inpatient Medications








 Medications


  (Trade)  Dose


 Ordered  Sig/Joce


 Route  Start Time


 Stop Time Status Last Admin


Dose Admin


 


 Heparin Sodium


  (Porcine)


  (Heparin Sq 5000


 Unit/0.5ml)  5,000 unit  Q12H


 SQ  3/5/17 21:00


 4/4/17 20:59  3/7/17 08:58


5,000 UNIT


 


 Miscellaneous


 Information


  (Order Awaiting


 Action)  1 ea  QS


 N/A  3/5/17 16:00


 4/4/17 15:59   


 


 


 Testosterone


 Cypionate 100 mg  100 mg  We@0900


 IM  3/8/17 09:00


 4/7/17 08:59   


 


 


 Norepinephrine


 Bitartrate/


 Dextrose


  (Levophed Inj/


 D5W 500ml)  508 ml @ 0


 mls/hr  Q0M PRN


 IV  3/5/17 15:00


 4/4/17 14:59  3/6/17 08:58


38.6 MLS/HR


 


 Ergocalciferol


  (Vitamin D Cap)  50,000


 interunit  DAILY@0900


 PO  3/5/17 17:00


 4/4/17 16:59  3/7/17 08:58


50,000 INTERUNIT


 


 Levothyroxine


 Sodium


  (Synthroid Tab)  300 mcg  DAILYBB


 PO  3/6/17 10:00


 4/5/17 09:59  3/7/17 06:13


300 MCG


 


 Docusate Sodium


  (coLACE CAP)  100 mg  BID


 PO  3/6/17 21:00


 4/5/17 20:59   


 


 


 Pantoprazole


 Sodium


  (Protonix Tab)  40 mg  DAILY


 PO  3/7/17 09:00


 4/6/17 08:59  3/7/17 08:58


40 MG


 


 Lorazepam


  (Ativan Tab)  1 mg  BID


 PO  3/6/17 21:00


 4/5/17 20:59  3/7/17 09:04


1 MG


 


 Morphine Sulfate


  (MoRPHine


 SULFATE INJ)  1 mg  Q3H  PRN


 IV  3/6/17 11:00


 3/20/17 10:59  3/7/17 04:20


1 MG


 


 Multivitamins


  (Multivitamin


 Tab)  1 tab  QAM


 PO  3/7/17 09:00


 4/6/17 08:59   


 


 


 Thiamine HCl


  (Vitamin B-1 Tab)  100 mg  QAM


 PO  3/7/17 09:00


 4/6/17 08:59   


 


 


 Folic Acid


  (Folvite Tab)  1 mg  QAM


 PO  3/7/17 09:00


 4/6/17 08:59   


 


 


 Nicotine


  (Nicoderm Cq


 21MG Patch)  1 patch  QAM


 TD  3/7/17 09:00


 4/6/17 08:59  3/7/17 08:57


1 PATCH


 


 Miscellaneous


  (Remove Nicoderm


 Patch)  1 ea  HS


 N/A  3/6/17 21:00


 4/5/17 20:59  3/6/17 19:55


1 EA


 


 Lorazepam 1 mg  1 mg  ONE  PRN


 IV  3/6/17 18:30


     


 


 


 Potassium


 Phosphate 15 mmol/


 Sodium Chloride  255 ml @ 


 88 mls/hr  TODAY@0800  ONCE


 IV  3/7/17 08:00


 3/7/17 10:53  3/7/17 08:54


88 MLS/HR


 


 Hydrocortisone


 Sodium Succinate/


 Syringe


  (Solu-Cortef IV/


 Syringe)  0.5 ml @ 4


 mls/min  Q6H


 IV  3/7/17 13:00


 4/6/17 12:59 UNV  


 


 


 Gabapentin


  (Neurontin Tab)  800 mg  SEE PROTOCOL TEXT


 PO  3/7/17 09:30


 4/6/17 09:29 UNV  


 


 


 Acetaminophen/


 Hydrocodone Bitart


  (Norco 5/325 Tab)  1 tab  Q4H  PRN


 PO  3/7/17 10:30


 3/21/17 10:29 UNV  


 








I & O:











 24-Hour Column 


 


 3/7/17





 07:59


 


Intake Total 7725 ml


 


Output Total 6775 ml


 


Balance 950 ml








Vital Signs:











  Date Time  Temp Pulse Resp B/P Pulse Ox O2 Delivery O2 Flow Rate FiO2


 


3/7/17 10:00  78 21 120/74 96 Room Air  


 


3/7/17 08:00      Room Air  


 


3/7/17 08:00 36.8 77 20 123/69 96 Room Air  


 


3/7/17 06:09  68 16 97/54 92 Room Air  


 


3/7/17 04:00 37.0 69 17 108/66 94 Room Air  


 


3/7/17 04:00     94 Room Air  


 


3/7/17 02:00  72 18 101/59 90 Room Air  


 


3/7/17 00:00 37.1 76 18 107/62 94 Room Air  


 


3/7/17 00:00     94 Room Air  


 


3/6/17 22:00  74 20 103/60 94 Room Air  


 


3/6/17 20:00 37.1 69 18 107/61 92 Room Air  


 


3/6/17 20:00     92 Room Air  


 


3/6/17 18:00  76 16 96/58 90 Room Air  


 


3/6/17 16:00      Room Air  


 


3/6/17 16:00 36.8 84 20 133/60 92 Room Air  


 


3/6/17 14:00  78 16 111/71 92 Room Air  


 


3/6/17 12:00 36.8 75 20 115/60 94 Nasal Cannula 2.0 


 


3/6/17 12:00      Room Air  








Laboratory Results:





Last 24 Hours








Test


  3/6/17


11:17 3/6/17


13:58 3/6/17


16:54 3/6/17


19:53


 


Bedside Glucose 119 mg/dl   166 mg/dl  


 


Sodium Level  136 mmol/L   137 mmol/L 


 


Potassium Level  3.3 mmol/L   3.2 mmol/L 


 


Chloride Level  100 mmol/L   100 mmol/L 


 


Carbon Dioxide Level  21 mmol/L   23 mmol/L 


 


Anion Gap  15.0 mmol/L   14.0 mmol/L 


 


Blood Urea Nitrogen  85 mg/dl   75 mg/dl 


 


Creatinine  4.40 mg/dl   3.30 mg/dl 


 


Est Creatinine Clear Calc


Drug Dose 


  27.6 ml/min 


  


  36.8 ml/min 


 


 


Estimated GFR (


American) 


  16.2 


  


  22.9 


 


 


Estimated GFR (Non-


American 


  14.0 


  


  19.8 


 


 


BUN/Creatinine Ratio  19.3   22.7 


 


Random Glucose  168 mg/dl   132 mg/dl 


 


Calcium Level  6.8 mg/dl   6.9 mg/dl 


 


Phosphorus Level  4.6 mg/dl   


 


Magnesium Level    1.8 mg/dl 














Test


  3/6/17


21:26 3/7/17


05:23 


  


 


 


Bedside Glucose 129 mg/dl    


 


White Blood Count  7.17 K/uL   


 


Red Blood Count  4.16 M/uL   


 


Hemoglobin  12.5 g/dL   


 


Hematocrit  37.4 %   


 


Mean Corpuscular Volume  89.9 fL   


 


Mean Corpuscular Hemoglobin  30.0 pg   


 


Mean Corpuscular Hemoglobin


Concent 


  33.4 g/dl 


  


  


 


 


Platelet Count  155 K/uL   


 


Mean Platelet Volume  10.2 fL   


 


Neutrophils (%) (Auto)  82.6 %   


 


Lymphocytes (%) (Auto)  10.2 %   


 


Monocytes (%) (Auto)  6.8 %   


 


Eosinophils (%) (Auto)  0.0 %   


 


Basophils (%) (Auto)  0.0 %   


 


Neutrophils # (Auto)  5.92 K/uL   


 


Lymphocytes # (Auto)  0.73 K/uL   


 


Monocytes # (Auto)  0.49 K/uL   


 


Eosinophils # (Auto)  0.00 K/uL   


 


Basophils # (Auto)  0.00 K/uL   


 


RDW Standard Deviation  53.2 fL   


 


RDW Coefficient of Variation  16.2 %   


 


Immature Granulocyte % (Auto)  0.4 %   


 


Immature Granulocyte # (Auto)  0.03 K/uL   


 


Sodium Level  140 mmol/L   


 


Potassium Level  3.0 mmol/L   


 


Chloride Level  105 mmol/L   


 


Carbon Dioxide Level  24 mmol/L   


 


Anion Gap  11.0 mmol/L   


 


Blood Urea Nitrogen  66 mg/dl   


 


Creatinine  2.00 mg/dl   


 


Est Creatinine Clear Calc


Drug Dose 


  61.0 ml/min 


  


  


 


 


Estimated GFR (


American) 


  42.0 


  


  


 


 


Estimated GFR (Non-


American 


  36.2 


  


  


 


 


BUN/Creatinine Ratio  33.0   


 


Random Glucose  129 mg/dl   


 


Calcium Level  6.7 mg/dl   


 


Phosphorus Level  2.0 mg/dl   


 


Magnesium Level  1.7 mg/dl   


 


Total Creatine Kinase  1107 U/L   


 


Albumin  2.3 gm/dl

## 2017-03-07 NOTE — DIAGNOSTIC IMAGING REPORT
CHEST ONE VIEW PORTABLE



CLINICAL HISTORY: hypoxia    



COMPARISON STUDY:  3/6/2017



FINDINGS: The heart is enlarged. There is a right internal jugular central

venous catheter. There is no overt failure. There is no focal pulmonary

consolidation. No pleural effusions are visualized.[ 



IMPRESSION: Cardiomegaly. Resolution of the previously described pulmonary edema







Electronically signed by:  Jose J Awad M.D.

3/7/2017 6:59 AM



Dictated Date/Time:  3/7/2017 6:58 AM

## 2017-03-07 NOTE — PROGRESS NOTE
Internal Med Progress Note


Date of Service:


Mar 7, 2017.


Provider Documentation:





SUBJECTIVE:





pt found sitting on chair 


complain of pain on rt knee , has warm compression placed 


no complain of sob 


No confusion -conversing appropriately 








OBJECTIVE:





Vital Signs-as noted below





Exam:


General-no sign of distress , sitting on chair , watching TV


Eyes-sclera non icteric 


Lungs-no rales or wheeze 


Heart-regular S1/S2


Abdomen-soft, non tender 


Extremities-rt knee mild tenderness, 


Neuro-AAo x3, no focal neurological deficit 





Lab data as noted below.


ASSESSMENT & PLAN:





CONFUSION /CHANGED MENTAL STATUS 


METABOLIC ENCEPHALOPATHY-likely form toxic metabolic from Narcotic pain 

medications /Percocet use 





- resolved-mental status improved to baseline, alert communicating 


- CT head negative for acute change 





Chronic alcohol abuse/ NARCOTIC /SEDATIVE MEDICATION USE : 


No signs of withdrawal for now


- Monitor for withdrawal, alcohol withdrawal protocol with gabapentin 


- Lorazepam 1 mg BID








SHOCK 


- likely Hypovolemic from Poor Oral Intake, Concurrent use of Diuretics


   improving


   Levophed  weaned off


   continue IV fluids





- Adrenal Insufficiency/PANHYPOPITUITARISM 


   on chronic PO Cortef, s/p Pituitary Resection


-started on IV  stress dose Hydrocortisone


-appreciate input form Critical care 


-pt has improved hemodynamically 


-off pressors 


-wean off IV steroid, gradually transition to Oral steroid regimen 











ACUTE RENAL FAILURE ON CKD 3


- likely PreRenal  baseline cr 1.5 


Cr improved to ~2 with IV hydration ; cont IVF 


avoid NSAID's ,contrast studies 


- Renal US: no obstruction





  hold Lisinopril/HCTZ


  good urine output





- Nephro on board-appreciate input 





HX PITUITARY ADENOMA S/P RESECTION 


- stress dose steroids as above


- continue testosterone, Humatrope, levothyroxine





HX SEIZURES


- continue gabapentin however renally dosed





HTN 


- currently hypotensive


- outpatient regimen: benazepril/HCTZ, atenolol on hold 


-cont IV hydration 





DVT PROPHYLAXIS


- SQ Heparin 





DISPOSITION 





lives with wife at home





stable to be transferred out of ICU 


PT /OT eval prior to discharge


Vital Signs:











  Date Time  Temp Pulse Resp B/P Pulse Ox O2 Delivery O2 Flow Rate FiO2


 


3/7/17 20:01 36.7 74 20 167/90 100 Room Air  


 


3/7/17 16:00      Room Air  


 


3/7/17 13:51 36.9 79 24  94   


 


3/7/17 12:00 36.9 79 24 134/81 94 Room Air  


 


3/7/17 12:00      Room Air  


 


3/7/17 10:00  78 21 120/74 96 Room Air  


 


3/7/17 08:00      Room Air  


 


3/7/17 08:00      Nasal Cannula  


 


3/7/17 08:00 36.8 77 20 123/69 96 Room Air  


 


3/7/17 06:09  68 16 97/54 92 Room Air  


 


3/7/17 04:00 37.0 69 17 108/66 94 Room Air  


 


3/7/17 04:00     94 Room Air  


 


3/7/17 02:00  72 18 101/59 90 Room Air  


 


3/7/17 00:00 37.1 76 18 107/62 94 Room Air  


 


3/7/17 00:00     94 Room Air  


 


3/6/17 22:00  74 20 103/60 94 Room Air  








Lab Results:





Results Past 24 Hours








Test


  3/7/17


05:23 3/7/17


16:19 Range/Units


 


 


White Blood Count 7.17  4.8-10.8  K/uL


 


Red Blood Count 4.16  4.7-6.1  M/uL


 


Hemoglobin 12.5  14.0-18.0  g/dL


 


Hematocrit 37.4  42-52  %


 


Mean Corpuscular Volume 89.9    fL


 


Mean Corpuscular Hemoglobin 30.0  25-34  pg


 


Mean Corpuscular Hemoglobin


Concent 33.4


  


  32-36  g/dl


 


 


Platelet Count 155  130-400  K/uL


 


Mean Platelet Volume 10.2  7.4-10.4  fL


 


Neutrophils (%) (Auto) 82.6   %


 


Lymphocytes (%) (Auto) 10.2   %


 


Monocytes (%) (Auto) 6.8   %


 


Eosinophils (%) (Auto) 0.0   %


 


Basophils (%) (Auto) 0.0   %


 


Neutrophils # (Auto) 5.92  1.4-6.5  K/uL


 


Lymphocytes # (Auto) 0.73  1.2-3.4  K/uL


 


Monocytes # (Auto) 0.49  0.11-0.59  K/uL


 


Eosinophils # (Auto) 0.00  0-0.5  K/uL


 


Basophils # (Auto) 0.00  0-0.2  K/uL


 


RDW Standard Deviation 53.2  36.4-46.3  fL


 


RDW Coefficient of Variation 16.2  11.5-14.5  %


 


Immature Granulocyte % (Auto) 0.4   %


 


Immature Granulocyte # (Auto) 0.03  0.00-0.02  K/uL


 


Sodium Level 140 143 136-145  mmol/L


 


Potassium Level 3.0 2.9 3.5-5.1  mmol/L


 


Chloride Level 105 107   mmol/L


 


Carbon Dioxide Level 24 24 21-32  mmol/L


 


Anion Gap 11.0 12.0 3-11  mmol/L


 


Blood Urea Nitrogen 66 55 7-18  mg/dl


 


Creatinine


  2.00


  1.40


  0.60-1.40


mg/dl


 


Est Creatinine Clear Calc


Drug Dose 61.0


  87.2


   ml/min


 


 


Estimated GFR (


American) 42.0


  64.6


   


 


 


Estimated GFR (Non-


American 36.2


  55.8


   


 


 


BUN/Creatinine Ratio 33.0 39.3 10-20  


 


Random Glucose 129 102 70-99  mg/dl


 


Calcium Level 6.7 7.1 8.5-10.1  mg/dl


 


Phosphorus Level 2.0 1.5 2.5-4.9  mg/dl


 


Magnesium Level 1.7 2.1 1.8-2.4  mg/dl


 


Total Creatine Kinase 1107    U/L


 


Albumin 2.3  3.4-5.0  gm/dl

## 2017-03-07 NOTE — NEPHROLOGY PROGRESS NOTE
Nephrology Progress Note


Date of Service:


Mar 7, 2017.


Subjective


58 yo male with atn/volume depletion with overmedication of pain medications 

contributing to hypotension and hypocalcemia with hyperphosphatemia.  pt 

overall doing much better. now that he is alert, his pain is coming back. 

complaining of left toe pain and knee pain and back pain.  no sob.





Objective











  Date Time  Temp Pulse Resp B/P Pulse Ox O2 Delivery O2 Flow Rate FiO2


 


3/7/17 06:09  68 16 97/54 92 Room Air  


 


3/7/17 04:00 37.0 69 17 108/66 94 Room Air  


 


3/7/17 04:00     94 Room Air  


 


3/7/17 02:00  72 18 101/59 90 Room Air  


 


3/7/17 00:00 37.1 76 18 107/62 94 Room Air  


 


3/7/17 00:00     94 Room Air  


 


3/6/17 22:00  74 20 103/60 94 Room Air  


 


3/6/17 20:00 37.1 69 18 107/61 92 Room Air  


 


3/6/17 20:00     92 Room Air  


 


3/6/17 18:00  76 16 96/58 90 Room Air  


 


3/6/17 16:00      Room Air  


 


3/6/17 16:00 36.8 84 20 133/60 92 Room Air  


 


3/6/17 14:00  78 16 111/71 92 Room Air  


 


3/6/17 12:00 36.8 75 20 115/60 94 Nasal Cannula 2.0 


 


3/6/17 12:00      Room Air  


 


3/6/17 10:00  85 22 115/67 92 Nasal Cannula 2.0 








Physical Exam:


General-aaox3, obese


Eyes-no scleral icterus


ENT-mmm


Neck-supple


Lungs-cta 


Heart-rrr, distant heart sounds


Abdomen-bs+ s/nt/nd 


Extremities-no c/c/e


Neuro-nonfocal





 Current Inpatient Medications








 Medications


  (Trade)  Dose


 Ordered  Sig/Joce


 Route  Start Time


 Stop Time Status Last Admin


Dose Admin


 


 Ceftriaxone


 Sodium 1 gm/


 Dextrose  50 ml @ 


 100 mls/hr  Q24H


 IV  3/5/17 15:00


 3/12/17 14:59  3/6/17 16:32


100 MLS/HR


 


 Doxycycline


 Hyclate/Dextrose


  (Vibramycin IV/


 D5 100ml)  110 ml @ 


 50 mls/hr  Q12H


 IV  3/5/17 15:00


 3/12/17 14:59  3/7/17 03:00


50 MLS/HR


 


 Heparin Sodium


  (Porcine) 5000


 unit  5,000 unit  Q12H


 SQ  3/5/17 21:00


 4/4/17 20:59  3/6/17 20:49


5,000 UNIT


 


 Hydrocortisone


 Sodium Succinate


 50 mg/Syringe  1 ml @ 4


 mls/min  Q6H


 IV  3/5/17 18:30


 4/4/17 18:29  3/7/17 06:13


4 MLS/MIN


 


 Sodium Chloride


  (Nss 1000ml)  1,000 ml @ 


 125 mls/hr  Q8H


 IV  3/5/17 12:45


 4/4/17 12:44  3/7/17 04:45


125 MLS/HR


 


 Miscellaneous


 Information


  (Order Awaiting


 Action)  1 ea  QS


 N/A  3/5/17 16:00


 4/4/17 15:59   


 


 


 Testosterone


 Cypionate 100 mg  100 mg  We@0900


 IM  3/8/17 09:00


 4/7/17 08:59   


 


 


 Norepinephrine


 Bitartrate/


 Dextrose


  (Levophed Inj/


 D5W 500ml)  508 ml @ 0


 mls/hr  Q0M PRN


 IV  3/5/17 15:00


 4/4/17 14:59  3/6/17 08:58


38.6 MLS/HR


 


 Ergocalciferol


  (Vitamin D Cap)  50,000


 interunit  DAILY@0900


 PO  3/5/17 17:00


 4/4/17 16:59  3/6/17 08:52


50,000 INTERUNIT


 


 Levothyroxine


 Sodium


  (Synthroid Tab)  300 mcg  DAILYBB


 PO  3/6/17 10:00


 4/5/17 09:59  3/7/17 06:13


300 MCG


 


 Diazepam


  (Valium Tab)  1 mg  BID


 PO  3/6/17 21:00


 4/5/17 20:59  3/6/17 20:48


1 MG


 


 Docusate Sodium


  (coLACE CAP)  100 mg  BID


 PO  3/6/17 21:00


 4/5/17 20:59   


 


 


 Pantoprazole


 Sodium


  (Protonix Tab)  40 mg  DAILY


 PO  3/7/17 09:00


 4/6/17 08:59   


 


 


 Lorazepam


  (Ativan Tab)  1 mg  BID


 PO  3/6/17 21:00


 4/5/17 20:59  3/6/17 20:48


1 MG


 


 Morphine Sulfate


  (MoRPHine


 SULFATE INJ)  1 mg  Q3H  PRN


 IV  3/6/17 11:00


 3/20/17 10:59  3/7/17 04:20


1 MG


 


 Multivitamins


  (Multivitamin


 Tab)  1 tab  QAM


 PO  3/7/17 09:00


 4/6/17 08:59   


 


 


 Thiamine HCl


  (Vitamin B-1 Tab)  100 mg  QAM


 PO  3/7/17 09:00


 4/6/17 08:59   


 


 


 Folic Acid


  (Folvite Tab)  1 mg  QAM


 PO  3/7/17 09:00


 4/6/17 08:59   


 


 


 Nicotine


  (Nicoderm Cq


 21MG Patch)  1 patch  QAM


 TD  3/7/17 09:00


 4/6/17 08:59   


 


 


 Miscellaneous


  (Remove Nicoderm


 Patch)  1 ea  HS


 N/A  3/6/17 21:00


 4/5/17 20:59  3/6/17 19:55


1 EA


 


 Lorazepam


  (Ativan Inj)  1 mg  ONE  PRN


 IV  3/6/17 18:30


     


 


 


 Gabapentin


  (Neurontin Cap)  100 mg  Q6H


 PO  3/7/17 06:00


 3/7/17 12:01  3/7/17 06:13


100 MG


 


 Gabapentin


  (Neurontin Tab)  600 mg  TODAY@1200


 PO  3/8/17 12:00


 3/8/17 12:01   


 


 


 Gabapentin


  (Neurontin Cap)  400 mg  TODAY@1200


 PO  3/9/17 12:00


 3/9/17 12:01   


 


 


 Gabapentin 200 mg  200 mg  TODAY@1200  ONCE


 PO  3/10/17 12:00


 3/10/17 12:01   


 


 


 Magnesium Sulfate


 1 gm/Prmx  100 ml @ 


 100 mls/hr  Q1H


 IV  3/7/17 08:00


 3/7/17 09:59   


 


 


 Potassium


 Phosphate/Sodium


 Chloride


  (Potassium


 Phosphate Inj/Nss


 250ml)  255 ml @ 


 88 mls/hr  TODAY@0800  ONCE


 IV  3/7/17 08:00


 3/7/17 10:53   


 











Last 24 Hours








Test


  3/6/17


11:17 3/6/17


13:58 3/6/17


16:54 3/6/17


19:53


 


Bedside Glucose 119 mg/dl   166 mg/dl  


 


Sodium Level  136 mmol/L   137 mmol/L 


 


Potassium Level  3.3 mmol/L   3.2 mmol/L 


 


Chloride Level  100 mmol/L   100 mmol/L 


 


Carbon Dioxide Level  21 mmol/L   23 mmol/L 


 


Anion Gap  15.0 mmol/L   14.0 mmol/L 


 


Blood Urea Nitrogen  85 mg/dl   75 mg/dl 


 


Creatinine  4.40 mg/dl   3.30 mg/dl 


 


Est Creatinine Clear Calc


Drug Dose 


  27.6 ml/min 


  


  36.8 ml/min 


 


 


Estimated GFR (


American) 


  16.2 


  


  22.9 


 


 


Estimated GFR (Non-


American 


  14.0 


  


  19.8 


 


 


BUN/Creatinine Ratio  19.3   22.7 


 


Random Glucose  168 mg/dl   132 mg/dl 


 


Calcium Level  6.8 mg/dl   6.9 mg/dl 


 


Phosphorus Level  4.6 mg/dl   


 


Magnesium Level    1.8 mg/dl 














Test


  3/6/17


21:26 3/7/17


05:23 


  


 


 


Bedside Glucose 129 mg/dl    


 


White Blood Count  7.17 K/uL   


 


Red Blood Count  4.16 M/uL   


 


Hemoglobin  12.5 g/dL   


 


Hematocrit  37.4 %   


 


Mean Corpuscular Volume  89.9 fL   


 


Mean Corpuscular Hemoglobin  30.0 pg   


 


Mean Corpuscular Hemoglobin


Concent 


  33.4 g/dl 


  


  


 


 


Platelet Count  155 K/uL   


 


Mean Platelet Volume  10.2 fL   


 


Neutrophils (%) (Auto)  82.6 %   


 


Lymphocytes (%) (Auto)  10.2 %   


 


Monocytes (%) (Auto)  6.8 %   


 


Eosinophils (%) (Auto)  0.0 %   


 


Basophils (%) (Auto)  0.0 %   


 


Neutrophils # (Auto)  5.92 K/uL   


 


Lymphocytes # (Auto)  0.73 K/uL   


 


Monocytes # (Auto)  0.49 K/uL   


 


Eosinophils # (Auto)  0.00 K/uL   


 


Basophils # (Auto)  0.00 K/uL   


 


RDW Standard Deviation  53.2 fL   


 


RDW Coefficient of Variation  16.2 %   


 


Immature Granulocyte % (Auto)  0.4 %   


 


Immature Granulocyte # (Auto)  0.03 K/uL   


 


Sodium Level  140 mmol/L   


 


Potassium Level  3.0 mmol/L   


 


Chloride Level  105 mmol/L   


 


Carbon Dioxide Level  24 mmol/L   


 


Anion Gap  11.0 mmol/L   


 


Blood Urea Nitrogen  66 mg/dl   


 


Creatinine  2.00 mg/dl   


 


Est Creatinine Clear Calc


Drug Dose 


  61.0 ml/min 


  


  


 


 


Estimated GFR (


American) 


  42.0 


  


  


 


 


Estimated GFR (Non-


American 


  36.2 


  


  


 


 


BUN/Creatinine Ratio  33.0   


 


Random Glucose  129 mg/dl   


 


Calcium Level  6.7 mg/dl   


 


Phosphorus Level  2.0 mg/dl   


 


Magnesium Level  1.7 mg/dl   


 


Total Creatine Kinase  1107 U/L   


 


Albumin  2.3 gm/dl   











Assessment & Plan


YI-non-oliguric-creatinine improved quickly indicating more likely volume 

depletion rather than atn.  have given aggressive fluids and no signs of volume 

overload at this time. continue the iv fluids at current rate, plan on 

decreasing rate tomorrow.  spoke to patient about removing andrews cathter. will 

wait 24 more hours and plan on removal tomorrow. 





Hypocalcemia-on vitamin d daily.  calcium levels remain low and will check an 

ionized calcium tomorrow.  hesitant to give more calcium with phos levels low. 

would like to replete the phosphorus before giving more calicum since iv 

calcium replacement will lower phos levels.  





hypophos-phos was high but has been cleared through the kidneys and through 

recieving iv calcium replacement.  repleting now.  





Hypomag-repleting prn.  





overall, pt doing much better, weaned off pressors, tolerating iv fluids. plan 

on reducing fluids tomorrow and removing catheter tomorrow. continue to replete 

electrolytes.

## 2017-03-08 VITALS
HEART RATE: 91 BPM | OXYGEN SATURATION: 92 % | TEMPERATURE: 98.6 F | DIASTOLIC BLOOD PRESSURE: 80 MMHG | SYSTOLIC BLOOD PRESSURE: 135 MMHG

## 2017-03-08 VITALS
OXYGEN SATURATION: 96 % | TEMPERATURE: 98.42 F | HEART RATE: 74 BPM | DIASTOLIC BLOOD PRESSURE: 86 MMHG | SYSTOLIC BLOOD PRESSURE: 165 MMHG

## 2017-03-08 VITALS
SYSTOLIC BLOOD PRESSURE: 134 MMHG | TEMPERATURE: 97.7 F | DIASTOLIC BLOOD PRESSURE: 81 MMHG | HEART RATE: 65 BPM | OXYGEN SATURATION: 96 %

## 2017-03-08 VITALS
TEMPERATURE: 97.88 F | DIASTOLIC BLOOD PRESSURE: 89 MMHG | HEART RATE: 65 BPM | SYSTOLIC BLOOD PRESSURE: 137 MMHG | OXYGEN SATURATION: 97 %

## 2017-03-08 VITALS
DIASTOLIC BLOOD PRESSURE: 91 MMHG | TEMPERATURE: 98.42 F | SYSTOLIC BLOOD PRESSURE: 166 MMHG | HEART RATE: 79 BPM | OXYGEN SATURATION: 97 %

## 2017-03-08 LAB
ANION GAP SERPL CALC-SCNC: 11 MMOL/L (ref 3–11)
ANION GAP SERPL CALC-SCNC: 12 MMOL/L (ref 3–11)
BASOPHILS # BLD: 0.02 K/UL (ref 0–0.2)
BASOPHILS NFR BLD: 0.3 %
BUN SERPL-MCNC: 33 MG/DL (ref 7–18)
BUN SERPL-MCNC: 42 MG/DL (ref 7–18)
BUN/CREAT SERPL: 25.2 (ref 10–20)
BUN/CREAT SERPL: 35.3 (ref 10–20)
CALCIUM SERPL-MCNC: 6.9 MG/DL (ref 8.5–10.1)
CALCIUM SERPL-MCNC: 7.6 MG/DL (ref 8.5–10.1)
CHLORIDE SERPL-SCNC: 110 MMOL/L (ref 98–107)
CHLORIDE SERPL-SCNC: 112 MMOL/L (ref 98–107)
CO2 SERPL-SCNC: 23 MMOL/L (ref 21–32)
CO2 SERPL-SCNC: 23 MMOL/L (ref 21–32)
COMPLETE: YES
CREAT CL PREDICTED SERPL C-G-VRATE: 101.7 ML/MIN
CREAT CL PREDICTED SERPL C-G-VRATE: 93.9 ML/MIN
CREAT SERPL-MCNC: 1.2 MG/DL (ref 0.6–1.4)
CREAT SERPL-MCNC: 1.3 MG/DL (ref 0.6–1.4)
EOSINOPHIL NFR BLD AUTO: 157 K/UL (ref 130–400)
GLUCOSE SERPL-MCNC: 103 MG/DL (ref 70–99)
GLUCOSE SERPL-MCNC: 111 MG/DL (ref 70–99)
HCT VFR BLD CALC: 37 % (ref 42–52)
IG%: 2.1 %
IMM GRANULOCYTES NFR BLD AUTO: 18.1 %
LYMPHOCYTES # BLD: 1.36 K/UL (ref 1.2–3.4)
MAGNESIUM SERPL-MCNC: 1.6 MG/DL (ref 1.8–2.4)
MAGNESIUM SERPL-MCNC: 1.8 MG/DL (ref 1.8–2.4)
MCH RBC QN AUTO: 29.8 PG (ref 25–34)
MCHC RBC AUTO-ENTMCNC: 33.5 G/DL (ref 32–36)
MCV RBC AUTO: 88.9 FL (ref 80–100)
MONOCYTES NFR BLD: 5.5 %
NEUTROPHILS # BLD AUTO: 0.9 %
NEUTROPHILS NFR BLD AUTO: 73.1 %
NRBC BLD AUTO-RTO: 0.5 %
PHOSPHATE SERPL-MCNC: 1.5 MG/DL (ref 2.5–4.9)
PMV BLD AUTO: 9.4 FL (ref 7.4–10.4)
POTASSIUM SERPL-SCNC: 3 MMOL/L (ref 3.5–5.1)
POTASSIUM SERPL-SCNC: 3.3 MMOL/L (ref 3.5–5.1)
RBC # BLD AUTO: 4.16 M/UL (ref 4.7–6.1)
SODIUM SERPL-SCNC: 145 MMOL/L (ref 136–145)
SODIUM SERPL-SCNC: 146 MMOL/L (ref 136–145)
WBC # BLD AUTO: 7.52 K/UL (ref 4.8–10.8)

## 2017-03-08 RX ADMIN — Medication SCH MG: at 08:39

## 2017-03-08 RX ADMIN — ALUMINUM ZIRCONIUM TRICHLOROHYDREX GLY SCH EA: 0.2 STICK TOPICAL at 23:20

## 2017-03-08 RX ADMIN — PANTOPRAZOLE SCH MG: 40 TABLET, DELAYED RELEASE ORAL at 08:39

## 2017-03-08 RX ADMIN — HYDROCODONE BITARTRATE AND ACETAMINOPHEN PRN TAB: 5; 325 TABLET ORAL at 20:45

## 2017-03-08 RX ADMIN — HYDROCODONE BITARTRATE AND ACETAMINOPHEN PRN TAB: 5; 325 TABLET ORAL at 00:06

## 2017-03-08 RX ADMIN — ALUMINUM ZIRCONIUM TRICHLOROHYDREX GLY SCH EA: 0.2 STICK TOPICAL at 00:47

## 2017-03-08 RX ADMIN — TESTOSTERONE CYPIONATE SCH MG: 200 INJECTION, SOLUTION INTRAMUSCULAR at 08:34

## 2017-03-08 RX ADMIN — LORAZEPAM SCH MG: 1 TABLET ORAL at 08:52

## 2017-03-08 RX ADMIN — HYDROCORTISONE SODIUM SUCCINATE SCH MLS/MIN: 100 INJECTION, POWDER, FOR SOLUTION INTRAMUSCULAR; INTRAVENOUS at 13:07

## 2017-03-08 RX ADMIN — GABAPENTIN SCH MG: 400 CAPSULE ORAL at 08:39

## 2017-03-08 RX ADMIN — DOCUSATE SODIUM SCH MG: 100 CAPSULE, LIQUID FILLED ORAL at 08:42

## 2017-03-08 RX ADMIN — ALUMINUM ZIRCONIUM TRICHLOROHYDREX GLY SCH EA: 0.2 STICK TOPICAL at 08:00

## 2017-03-08 RX ADMIN — ALUMINUM ZIRCONIUM TRICHLOROHYDREX GLY SCH EA: 0.2 STICK TOPICAL at 15:40

## 2017-03-08 RX ADMIN — GABAPENTIN SCH MG: 400 CAPSULE ORAL at 18:34

## 2017-03-08 RX ADMIN — NICOTINE SCH PATCH: 21 PATCH, EXTENDED RELEASE TRANSDERMAL at 08:40

## 2017-03-08 RX ADMIN — HYDROCODONE BITARTRATE AND ACETAMINOPHEN PRN TAB: 5; 325 TABLET ORAL at 06:25

## 2017-03-08 RX ADMIN — HYDROCODONE BITARTRATE AND ACETAMINOPHEN PRN TAB: 5; 325 TABLET ORAL at 11:12

## 2017-03-08 RX ADMIN — HYDROCORTISONE SODIUM SUCCINATE SCH MLS/MIN: 100 INJECTION, POWDER, FOR SOLUTION INTRAMUSCULAR; INTRAVENOUS at 18:34

## 2017-03-08 RX ADMIN — LORAZEPAM SCH MG: 1 TABLET ORAL at 20:45

## 2017-03-08 RX ADMIN — ERGOCALCIFEROL SCH INTERUNIT: 1.25 CAPSULE, LIQUID FILLED ORAL at 11:07

## 2017-03-08 RX ADMIN — POTASSIUM CHLORIDE SCH MLS/HR: 10 INJECTION, SOLUTION INTRAVENOUS at 20:02

## 2017-03-08 RX ADMIN — HEPARIN SODIUM SCH UNIT: 10000 INJECTION, SOLUTION INTRAVENOUS; SUBCUTANEOUS at 08:44

## 2017-03-08 RX ADMIN — HYDROCODONE BITARTRATE AND ACETAMINOPHEN PRN TAB: 5; 325 TABLET ORAL at 15:47

## 2017-03-08 RX ADMIN — DOCUSATE SODIUM SCH MG: 100 CAPSULE, LIQUID FILLED ORAL at 20:44

## 2017-03-08 RX ADMIN — POTASSIUM CHLORIDE SCH MLS/HR: 10 INJECTION, SOLUTION INTRAVENOUS at 21:59

## 2017-03-08 RX ADMIN — HYDROCORTISONE SODIUM SUCCINATE SCH MLS/MIN: 100 INJECTION, POWDER, FOR SOLUTION INTRAMUSCULAR; INTRAVENOUS at 01:41

## 2017-03-08 RX ADMIN — SODIUM CHLORIDE SCH MLS/HR: 900 INJECTION, SOLUTION INTRAVENOUS at 01:40

## 2017-03-08 RX ADMIN — Medication SCH TAB: at 08:38

## 2017-03-08 RX ADMIN — HEPARIN SODIUM SCH UNIT: 10000 INJECTION, SOLUTION INTRAVENOUS; SUBCUTANEOUS at 20:47

## 2017-03-08 RX ADMIN — HYDROCORTISONE SODIUM SUCCINATE SCH MLS/MIN: 100 INJECTION, POWDER, FOR SOLUTION INTRAMUSCULAR; INTRAVENOUS at 06:06

## 2017-03-08 RX ADMIN — POTASSIUM CHLORIDE SCH MLS/HR: 10 INJECTION, SOLUTION INTRAVENOUS at 19:57

## 2017-03-08 RX ADMIN — LEVOTHYROXINE SODIUM SCH MCG: 150 TABLET ORAL at 06:06

## 2017-03-08 RX ADMIN — GABAPENTIN SCH MG: 400 CAPSULE ORAL at 01:40

## 2017-03-08 NOTE — PROGRESS NOTE
Internal Med Progress Note


Date of Service:


Mar 8, 2017.


Provider Documentation:





SUBJECTIVE:





feels much better today 


has minimum pain on rt knee 


 no fever or chills 


no confusion 





OBJECTIVE:





Vital Signs-as noted below





Exam:


General-no sign of distress , 


Eyes-sclera non icteric 


Lungs-no rales or wheeze 


Heart-regular S1/S2


Abdomen-soft, non tender 


Extremities-rt knee mild tenderness-improved 


Neuro-AAo x3, no focal neurological deficit 





Lab data as noted below.


ASSESSMENT & PLAN:





CONFUSION /CHANGED MENTAL STATUS 


METABOLIC ENCEPHALOPATHY-





-due to taking too many Narcotic pain meds ( Percocet ) 


hx of Alcohol abuse 





- resolved


-mental status improved to baseline, alert communicating , oriented to time , 

place and person 


- CT head negative for acute change 





Chronic alcohol abuse/ NARCOTIC /SEDATIVE MEDICATION USE : 


no complain of agitation 


No signs of withdrawal for now


- Monitor for withdrawal, alcohol withdrawal protocol with gabapentin 


- Lorazepam 1 mg BID








HYPOTENSION ?SHOCK 


- likely Hypovolemic from Poor Oral Intake, Concurrent use of Diuretics


required pressor support with Levophed 


weaned off 


-resolved , BP remains stable 


transferred out of  ICU 


-IVF D/sheela 





- Adrenal Insufficiency/PANHYPOPITUITARISM 


   on chronic PO Cortef, s/p Pituitary Resection


-started on IV  stress dose Hydrocortisone


-ordered to wean down as -pt has improved hemodynamically 


-appreciate input form Critical care 





 gradually transition to Oral steroid regimen in next 24-48 hrs 





LOW MG /K /PHOS : 


possible due to chronic alcohol abuse 


replaced 


cont to monitor lytes 





ACUTE RENAL FAILURE ON CKD 3


- likely PreRenal  baseline cr 1.5 


Cr improved to ~2 with IV hydration ; cont IVF 


avoid NSAID's ,contrast studies 


- Renal US: no obstruction





  hold Lisinopril/HCTZ


  good urine output





- Nephro on board-appreciate input 





HX PITUITARY ADENOMA S/P RESECTION 


- stress dose steroids as above


- continue testosterone, Humatrope, levothyroxine





HX SEIZURES


- continue gabapentin however renally dosed





HTN 


- BP stable 


- outpatient regimen: benazepril/HCTZ on hold for YI 


-cont to monitor 





DVT PROPHYLAXIS


- SQ Heparin 





DISPOSITION 





lives with wife at home





PT/OT eval requested 


plan to discharge home when medically stable


Vital Signs:











  Date Time  Temp Pulse Resp B/P Pulse Ox O2 Delivery O2 Flow Rate FiO2


 


3/9/17 16:42 37.0 82 24 175/91 98   


 


3/9/17 16:26 36.7 88 18  96   


 


3/9/17 15:18 36.7 88 18 166/90 96 Room Air  


 


3/9/17 12:00     96 Room Air  


 


3/9/17 10:54 36.6 82 20 162/91 96 Room Air  


 


3/9/17 10:13  80   97   


 


3/9/17 08:00     95 Room Air  


 


3/9/17 07:25 37.0 76 20 157/89 95 Room Air  


 


3/9/17 04:00      Room Air  


 


3/9/17 03:24 37.0 70 19 158/88 96 Room Air  


 


3/9/17 00:01 37.0 74 20 166/91 95 Room Air  


 


3/9/17 00:00      Room Air  


 


3/8/17 20:00      Room Air  


 


3/8/17 18:48 36.9 79 20 166/91 97 Room Air  








Lab Results:





Results Past 24 Hours








Test


  3/9/17


05:20 Range/Units


 


 


White Blood Count 10.75 4.8-10.8  K/uL


 


Red Blood Count 4.38 4.7-6.1  M/uL


 


Hemoglobin 12.7 14.0-18.0  g/dL


 


Hematocrit 39.3 42-52  %


 


Mean Corpuscular Volume 89.7   fL


 


Mean Corpuscular Hemoglobin 29.0 25-34  pg


 


Mean Corpuscular Hemoglobin


Concent 32.3


  32-36  g/dl


 


 


RDW Standard Deviation 53.3 36.4-46.3  fL


 


RDW Coefficient of Variation 16.4 11.5-14.5  %


 


Platelet Count 202 130-400  K/uL


 


Mean Platelet Volume 9.3 7.4-10.4  fL


 


Nucleated RBC Absolute Count


(auto) 0.31


  0-0  K/uL


 


 


Nucleated Red Blood Cells % 2.9  %


 


Sodium Level 145 136-145  mmol/L


 


Potassium Level 3.1 3.5-5.1  mmol/L


 


Chloride Level 110   mmol/L


 


Carbon Dioxide Level 26 21-32  mmol/L


 


Anion Gap 9.0 3-11  mmol/L


 


Blood Urea Nitrogen 23 7-18  mg/dl


 


Creatinine


  0.98


  0.60-1.40


mg/dl


 


Est Creatinine Clear Calc


Drug Dose 126.0


   ml/min


 


 


Estimated GFR (


American) 99.5


   


 


 


Estimated GFR (Non-


American 85.8


   


 


 


BUN/Creatinine Ratio 23.5 10-20  


 


Random Glucose 70 70-99  mg/dl


 


Calcium Level 7.3 8.5-10.1  mg/dl


 


Phosphorus Level 1.7 2.5-4.9  mg/dl


 


Magnesium Level 1.6 1.8-2.4  mg/dl

## 2017-03-08 NOTE — NEPHROLOGY PROGRESS NOTE
Nephrology Progress Note


Date of Service:


Mar 8, 2017.


Subjective


56 yo male with atn/volume depletion from overmedication of pain medications 

contributing to hypotension and hypocalcemia with hyperphosphatemia.  pt doing 

much better and now with low potassium, low phos, low calcium, low magnesium.  

pt continues to clinically improve and feels much, much better. eating well.





Objective











  Date Time  Temp Pulse Resp B/P Pulse Ox O2 Delivery O2 Flow Rate FiO2


 


3/8/17 07:53 36.6 65 20 137/89 97 Room Air  


 


3/8/17 04:00      Room Air  


 


3/8/17 03:34 36.5 65 22 134/81 96 Room Air  


 


3/8/17 00:00      Room Air  


 


3/7/17 23:25 36.6 77 21 142/76 92 Room Air  


 


3/7/17 20:01 36.7 74 20 167/90 100 Room Air  


 


3/7/17 20:00      Room Air  


 


3/7/17 16:00      Room Air  


 


3/7/17 13:51 36.9 79 24  94   


 


3/7/17 12:00 36.9 79 24 134/81 94 Room Air  


 


3/7/17 12:00      Room Air  


 


3/7/17 10:00  78 21 120/74 96 Room Air  








Physical Exam:


General-aaox3, obese


Eyes-no scleral icterus


ENT-mmm


Neck-supple


Lungs-clear


Heart-regular


Abdomen-bs+ s/nt/nd 


Extremities-no c/c/e


Neuro-nonfocal





 Current Inpatient Medications








 Medications


  (Trade)  Dose


 Ordered  Sig/Joce


 Route  Start Time


 Stop Time Status Last Admin


Dose Admin


 


 Heparin Sodium


  (Porcine)


  (Heparin Sq 5000


 Unit/0.5ml)  5,000 unit  Q12H


 SQ  3/5/17 21:00


 4/4/17 20:59  3/7/17 20:55


5,000 UNIT


 


 Miscellaneous


 Information


  (Order Awaiting


 Action)  1 ea  QS


 N/A  3/5/17 16:00


 4/4/17 15:59  3/7/17 08:00


1 EA


 


 Testosterone


 Cypionate


  (Depo-Testosterone


 Inj)  100 mg  We@0900


 IM  3/8/17 09:00


 4/7/17 08:59   


 


 


 Ergocalciferol


  (Vitamin D Cap)  50,000


 interunit  DAILY@0900


 PO  3/5/17 17:00


 4/4/17 16:59  3/7/17 08:58


50,000 INTERUNIT


 


 Levothyroxine


 Sodium


  (Synthroid Tab)  300 mcg  DAILYBB


 PO  3/6/17 10:00


 4/5/17 09:59  3/8/17 06:06


300 MCG


 


 Docusate Sodium


  (coLACE CAP)  100 mg  BID


 PO  3/6/17 21:00


 4/5/17 20:59  3/7/17 20:51


100 MG


 


 Pantoprazole


 Sodium


  (Protonix Tab)  40 mg  DAILY


 PO  3/7/17 09:00


 4/6/17 08:59  3/7/17 08:58


40 MG


 


 Lorazepam


  (Ativan Tab)  1 mg  BID


 PO  3/6/17 21:00


 4/5/17 20:59  3/7/17 20:51


1 MG


 


 Morphine Sulfate


  (MoRPHine


 SULFATE INJ)  1 mg  Q3H  PRN


 IV  3/6/17 11:00


 3/20/17 10:59  3/7/17 04:20


1 MG


 


 Multivitamins


  (Multivitamin


 Tab)  1 tab  QAM


 PO  3/7/17 09:00


 4/6/17 08:59  3/7/17 10:58


1 TAB


 


 Thiamine HCl


  (Vitamin B-1 Tab)  100 mg  QAM


 PO  3/7/17 09:00


 4/6/17 08:59  3/7/17 10:58


100 MG


 


 Folic Acid


  (Folvite Tab)  1 mg  QAM


 PO  3/7/17 09:00


 4/6/17 08:59  3/7/17 10:58


1 MG


 


 Nicotine


  (Nicoderm Cq


 21MG Patch)  1 patch  QAM


 TD  3/7/17 09:00


 4/6/17 08:59  3/7/17 08:57


1 PATCH


 


 Miscellaneous


  (Remove Nicoderm


 Patch)  1 ea  HS


 N/A  3/6/17 21:00


 4/5/17 20:59  3/7/17 20:57


1 EA


 


 Lorazepam 1 mg  1 mg  ONE  PRN


 IV  3/6/17 18:30


     


 


 


 Hydrocortisone


 Sodium Succinate/


 Syringe


  (Solu-Cortef IV/


 Syringe)  0.5 ml @ 4


 mls/min  Q6H


 IV  3/7/17 13:00


 4/6/17 12:59  3/8/17 06:06


4 MLS/MIN


 


 Acetaminophen/


 Hydrocodone Bitart


  (Norco 5/325 Tab)  1 tab  Q4H  PRN


 PO  3/7/17 10:30


 3/21/17 10:29  3/8/17 06:25


1 TAB


 


 Gabapentin


  (Neurontin Cap)  400 mg  Q8H


 PO  3/8/17 02:00


 3/8/17 18:01  3/8/17 01:40


400 MG


 


 Gabapentin


  (Neurontin Cap)  400 mg  Q12H


 PO  3/9/17 06:00


 3/9/17 18:01   


 


 


 Gabapentin 400 mg  400 mg  Q24H


 PO  3/10/17 18:00


 3/10/17 18:01   


 


 


 Calcium Gluconate


 2000 mg/Sodium


 Chloride  70 ml @ 


 140 mls/hr  TODAY@0815  ONCE


 IV  3/8/17 08:15


 3/8/17 08:44   


 


 


 Potassium


 Phosphate/Sodium


 Chloride


  (Potassium


 Phosphate Inj/Nss


 250ml)  255 ml @ 


 127.5 mls/


 hr  TODAY@0845


 IV  3/8/17 08:45


 3/8/17 10:44   


 











Last 24 Hours








Test


  3/7/17


16:19 3/8/17


05:20


 


Sodium Level 143 mmol/L  146 mmol/L 


 


Potassium Level 2.9 mmol/L  3.0 mmol/L 


 


Chloride Level 107 mmol/L  112 mmol/L 


 


Carbon Dioxide Level 24 mmol/L  23 mmol/L 


 


Anion Gap 12.0 mmol/L  11.0 mmol/L 


 


Blood Urea Nitrogen 55 mg/dl  42 mg/dl 


 


Creatinine 1.40 mg/dl  1.20 mg/dl 


 


Est Creatinine Clear Calc


Drug Dose 87.2 ml/min 


  101.7 ml/min 


 


 


Estimated GFR (


American) 64.6 


  77.9 


 


 


Estimated GFR (Non-


American 55.8 


  67.2 


 


 


BUN/Creatinine Ratio 39.3  35.3 


 


Random Glucose 102 mg/dl  103 mg/dl 


 


Calcium Level 7.1 mg/dl  6.9 mg/dl 


 


Phosphorus Level 1.5 mg/dl  


 


Magnesium Level 2.1 mg/dl  1.8 mg/dl 


 


White Blood Count  7.52 K/uL 


 


Red Blood Count  4.16 M/uL 


 


Hemoglobin  12.4 g/dL 


 


Hematocrit  37.0 % 


 


Mean Corpuscular Volume  88.9 fL 


 


Mean Corpuscular Hemoglobin  29.8 pg 


 


Mean Corpuscular Hemoglobin


Concent 


  33.5 g/dl 


 


 


Platelet Count  157 K/uL 


 


Mean Platelet Volume  9.4 fL 


 


Neutrophils (%) (Auto)  73.1 % 


 


Lymphocytes (%) (Auto)  18.1 % 


 


Monocytes (%) (Auto)  5.5 % 


 


Eosinophils (%) (Auto)  0.9 % 


 


Basophils (%) (Auto)  0.3 % 


 


Neutrophils # (Auto)  5.50 K/uL 


 


Lymphocytes # (Auto)  1.36 K/uL 


 


Monocytes # (Auto)  0.41 K/uL 


 


Eosinophils # (Auto)  0.07 K/uL 


 


Basophils # (Auto)  0.02 K/uL 


 


RDW Standard Deviation  53.2 fL 


 


RDW Coefficient of Variation  16.3 % 


 


Immature Granulocyte % (Auto)  2.1 % 


 


Immature Granulocyte # (Auto)  0.16 K/uL 


 


Nucleated RBC Absolute Count


(auto) 


  0.04 K/uL 


 


 


Nucleated Red Blood Cells %  0.5 % 


 


Ionized Calcium  0.97 mmol/l 











Assessment & Plan


YI-non-oliguric-creatinine has improved quickly from 12 to 1.2. will stop the 

iv fluids and remove the andrews catheter.  will take a couple days for the 

concentration gradient to re-equilibrate so may be losing electrolytes and 

requiring repletion. 





Hypocalcemia-on vitamin d daily. ionized calcium is low, will give 2 grams of 

calcium gluconate and recheck this afternoon. 





Hypophos-giving 15 mmol of kphos iv and to recheck this afternoon





Hypokalemia-giving 40meq of kdur and getting k in the kphos. to recheck this 

afternoon.

## 2017-03-09 VITALS
HEART RATE: 88 BPM | OXYGEN SATURATION: 96 % | TEMPERATURE: 98.06 F | DIASTOLIC BLOOD PRESSURE: 90 MMHG | SYSTOLIC BLOOD PRESSURE: 166 MMHG

## 2017-03-09 VITALS
HEART RATE: 76 BPM | DIASTOLIC BLOOD PRESSURE: 89 MMHG | TEMPERATURE: 98.6 F | SYSTOLIC BLOOD PRESSURE: 157 MMHG | OXYGEN SATURATION: 95 %

## 2017-03-09 VITALS
DIASTOLIC BLOOD PRESSURE: 91 MMHG | TEMPERATURE: 97.88 F | OXYGEN SATURATION: 96 % | HEART RATE: 82 BPM | SYSTOLIC BLOOD PRESSURE: 162 MMHG

## 2017-03-09 VITALS — SYSTOLIC BLOOD PRESSURE: 169 MMHG | HEART RATE: 95 BPM | DIASTOLIC BLOOD PRESSURE: 96 MMHG | OXYGEN SATURATION: 98 %

## 2017-03-09 VITALS — SYSTOLIC BLOOD PRESSURE: 155 MMHG | DIASTOLIC BLOOD PRESSURE: 82 MMHG | HEART RATE: 91 BPM

## 2017-03-09 VITALS
HEART RATE: 74 BPM | SYSTOLIC BLOOD PRESSURE: 166 MMHG | OXYGEN SATURATION: 95 % | TEMPERATURE: 98.6 F | DIASTOLIC BLOOD PRESSURE: 91 MMHG

## 2017-03-09 VITALS — HEART RATE: 87 BPM | OXYGEN SATURATION: 97 % | DIASTOLIC BLOOD PRESSURE: 89 MMHG | SYSTOLIC BLOOD PRESSURE: 176 MMHG

## 2017-03-09 VITALS
HEART RATE: 85 BPM | SYSTOLIC BLOOD PRESSURE: 177 MMHG | OXYGEN SATURATION: 96 % | DIASTOLIC BLOOD PRESSURE: 89 MMHG | TEMPERATURE: 98.6 F

## 2017-03-09 VITALS
HEART RATE: 88 BPM | DIASTOLIC BLOOD PRESSURE: 90 MMHG | SYSTOLIC BLOOD PRESSURE: 166 MMHG | OXYGEN SATURATION: 96 % | TEMPERATURE: 98.06 F

## 2017-03-09 VITALS — DIASTOLIC BLOOD PRESSURE: 80 MMHG | HEART RATE: 93 BPM | OXYGEN SATURATION: 96 % | SYSTOLIC BLOOD PRESSURE: 172 MMHG

## 2017-03-09 VITALS — OXYGEN SATURATION: 96 %

## 2017-03-09 VITALS
TEMPERATURE: 98.6 F | OXYGEN SATURATION: 98 % | HEART RATE: 82 BPM | DIASTOLIC BLOOD PRESSURE: 91 MMHG | SYSTOLIC BLOOD PRESSURE: 175 MMHG

## 2017-03-09 VITALS
HEART RATE: 70 BPM | TEMPERATURE: 98.6 F | SYSTOLIC BLOOD PRESSURE: 158 MMHG | OXYGEN SATURATION: 96 % | DIASTOLIC BLOOD PRESSURE: 88 MMHG

## 2017-03-09 VITALS — OXYGEN SATURATION: 98 % | SYSTOLIC BLOOD PRESSURE: 169 MMHG | DIASTOLIC BLOOD PRESSURE: 96 MMHG | HEART RATE: 95 BPM

## 2017-03-09 VITALS — OXYGEN SATURATION: 97 % | SYSTOLIC BLOOD PRESSURE: 155 MMHG | DIASTOLIC BLOOD PRESSURE: 87 MMHG | HEART RATE: 80 BPM

## 2017-03-09 VITALS — OXYGEN SATURATION: 95 %

## 2017-03-09 LAB
ANION GAP SERPL CALC-SCNC: 9 MMOL/L (ref 3–11)
ANION GAP SERPL CALC-SCNC: 9 MMOL/L (ref 3–11)
BUN SERPL-MCNC: 19 MG/DL (ref 7–18)
BUN SERPL-MCNC: 23 MG/DL (ref 7–18)
BUN/CREAT SERPL: 15.9 (ref 10–20)
BUN/CREAT SERPL: 23.5 (ref 10–20)
CALCIUM SERPL-MCNC: 7.3 MG/DL (ref 8.5–10.1)
CALCIUM SERPL-MCNC: 7.9 MG/DL (ref 8.5–10.1)
CHLORIDE SERPL-SCNC: 110 MMOL/L (ref 98–107)
CHLORIDE SERPL-SCNC: 110 MMOL/L (ref 98–107)
CO2 SERPL-SCNC: 25 MMOL/L (ref 21–32)
CO2 SERPL-SCNC: 26 MMOL/L (ref 21–32)
COD UR: NEGATIVE NG/ML
CREAT CL PREDICTED SERPL C-G-VRATE: 102.9 ML/MIN
CREAT CL PREDICTED SERPL C-G-VRATE: 126 ML/MIN
CREAT SERPL-MCNC: 0.98 MG/DL (ref 0.6–1.4)
CREAT SERPL-MCNC: 1.2 MG/DL (ref 0.6–1.4)
EOSINOPHIL NFR BLD AUTO: 202 K/UL (ref 130–400)
GLUCOSE SERPL-MCNC: 70 MG/DL (ref 70–99)
GLUCOSE SERPL-MCNC: 85 MG/DL (ref 70–99)
HCT VFR BLD CALC: 39.3 % (ref 42–52)
HYDROCOD UR: NEGATIVE NG/ML
HYDROMOR UR: NEGATIVE NG/ML
MAGNESIUM SERPL-MCNC: 1.6 MG/DL (ref 1.8–2.4)
MAGNESIUM SERPL-MCNC: 1.8 MG/DL (ref 1.8–2.4)
MCH RBC QN AUTO: 29 PG (ref 25–34)
MCHC RBC AUTO-ENTMCNC: 32.3 G/DL (ref 32–36)
MCV RBC AUTO: 89.7 FL (ref 80–100)
MORPHINE UR CFM-MCNC: NEGATIVE NG/ML
NORHYDROCODONE CONF UR: NEGATIVE NG/ML
NRBC BLD AUTO-RTO: 2.9 %
OXYMORPH UR: 951 NG/ML
PHOSPHATE SERPL-MCNC: 1.7 MG/DL (ref 2.5–4.9)
PHOSPHATE SERPL-MCNC: 2 MG/DL (ref 2.5–4.9)
PMV BLD AUTO: 9.3 FL (ref 7.4–10.4)
POTASSIUM SERPL-SCNC: 3.1 MMOL/L (ref 3.5–5.1)
POTASSIUM SERPL-SCNC: 3.3 MMOL/L (ref 3.5–5.1)
RBC # BLD AUTO: 4.38 M/UL (ref 4.7–6.1)
SODIUM SERPL-SCNC: 144 MMOL/L (ref 136–145)
SODIUM SERPL-SCNC: 145 MMOL/L (ref 136–145)
WBC # BLD AUTO: 10.75 K/UL (ref 4.8–10.8)

## 2017-03-09 RX ADMIN — HYDROCODONE BITARTRATE AND ACETAMINOPHEN PRN TAB: 5; 325 TABLET ORAL at 11:30

## 2017-03-09 RX ADMIN — LEVOTHYROXINE SODIUM SCH MCG: 150 TABLET ORAL at 06:11

## 2017-03-09 RX ADMIN — DOCUSATE SODIUM SCH MG: 100 CAPSULE, LIQUID FILLED ORAL at 20:30

## 2017-03-09 RX ADMIN — LORAZEPAM SCH MG: 1 TABLET ORAL at 07:44

## 2017-03-09 RX ADMIN — Medication PRN MG: at 20:42

## 2017-03-09 RX ADMIN — DIBASIC SODIUM PHOSPHATE, MONOBASIC POTASSIUM PHOSPHATE AND MONOBASIC SODIUM PHOSPHATE SCH TAB: 852; 155; 130 TABLET ORAL at 20:29

## 2017-03-09 RX ADMIN — HYDROCODONE BITARTRATE AND ACETAMINOPHEN PRN TAB: 5; 325 TABLET ORAL at 21:14

## 2017-03-09 RX ADMIN — DIBASIC SODIUM PHOSPHATE, MONOBASIC POTASSIUM PHOSPHATE AND MONOBASIC SODIUM PHOSPHATE SCH TAB: 852; 155; 130 TABLET ORAL at 19:54

## 2017-03-09 RX ADMIN — Medication SCH TAB: at 07:47

## 2017-03-09 RX ADMIN — ALUMINUM ZIRCONIUM TRICHLOROHYDREX GLY SCH EA: 0.2 STICK TOPICAL at 16:00

## 2017-03-09 RX ADMIN — HYDROCODONE BITARTRATE AND ACETAMINOPHEN PRN TAB: 5; 325 TABLET ORAL at 07:44

## 2017-03-09 RX ADMIN — HYDROCODONE BITARTRATE AND ACETAMINOPHEN PRN TAB: 5; 325 TABLET ORAL at 16:22

## 2017-03-09 RX ADMIN — HYDROCORTISONE SCH MG: 10 TABLET ORAL at 20:30

## 2017-03-09 RX ADMIN — ERGOCALCIFEROL SCH INTERUNIT: 1.25 CAPSULE, LIQUID FILLED ORAL at 07:46

## 2017-03-09 RX ADMIN — ALUMINUM ZIRCONIUM TRICHLOROHYDREX GLY SCH EA: 0.2 STICK TOPICAL at 23:33

## 2017-03-09 RX ADMIN — GABAPENTIN SCH MG: 400 CAPSULE ORAL at 18:03

## 2017-03-09 RX ADMIN — MAGNESIUM SULFATE IN DEXTROSE SCH MLS/HR: 10 INJECTION, SOLUTION INTRAVENOUS at 12:22

## 2017-03-09 RX ADMIN — MAGNESIUM SULFATE IN DEXTROSE SCH MLS/HR: 10 INJECTION, SOLUTION INTRAVENOUS at 11:12

## 2017-03-09 RX ADMIN — GABAPENTIN SCH MG: 400 CAPSULE ORAL at 06:11

## 2017-03-09 RX ADMIN — HEPARIN SODIUM SCH UNIT: 10000 INJECTION, SOLUTION INTRAVENOUS; SUBCUTANEOUS at 07:50

## 2017-03-09 RX ADMIN — PANTOPRAZOLE SCH MG: 40 TABLET, DELAYED RELEASE ORAL at 07:46

## 2017-03-09 RX ADMIN — NICOTINE SCH PATCH: 21 PATCH, EXTENDED RELEASE TRANSDERMAL at 07:49

## 2017-03-09 RX ADMIN — Medication SCH MG: at 07:47

## 2017-03-09 RX ADMIN — ALUMINUM ZIRCONIUM TRICHLOROHYDREX GLY SCH EA: 0.2 STICK TOPICAL at 11:29

## 2017-03-09 RX ADMIN — DOCUSATE SODIUM SCH MG: 100 CAPSULE, LIQUID FILLED ORAL at 07:48

## 2017-03-09 RX ADMIN — LORAZEPAM SCH MG: 1 TABLET ORAL at 20:30

## 2017-03-09 NOTE — PROGRESS NOTE
Internal Med Progress Note


Date of Service:


Mar 9, 2017.


Provider Documentation:





SUBJECTIVE:





walking in room , has minimum pain  on rt knee 


no sign of pain or discomfort 


no agitation or sign of withdrawal 


no arrhythmia noted in tele 





OBJECTIVE:





Vital Signs-as noted below





Exam:


General-no sign of distress , walking in room , conversing 


Eyes-sclera non icteric 


Lungs-no rales or wheeze 


Heart-regular S1/S2


Abdomen-soft, non tender 


Extremities-rt knee mild tenderness, 


Neuro-AAo x3, no focal neurological deficit 





Lab data as noted below.


ASSESSMENT & PLAN:





CONFUSION /CHANGED MENTAL STATUS 


METABOLIC ENCEPHALOPATHY-





presented with hypotension /altered mental status 


likely form toxic metabolic from Narcotic pain medications /Percocet use 


required Pressor support for marked hypotension -admitted to ICU 


Rt IJ central line placed by ICU team 





- symptom resolved-mental status improved to baseline, alert communicating 


Acute renal failure /severe electrolyte derangement /hypotension corrected 


- CT head negative for acute change 





_d/w pt -can not recall how many Percocet he took


admits of drinking heavily 


-aware that it has been a problem , can not afford to go to Alcohol rehab -reyes 

not work /wife is the only person earning 


willing to have information /resources for Alcohol rehab 


will update CM  





Chronic alcohol abuse/ NARCOTIC /SEDATIVE MEDICATION USE : 


hx of long term alcohol abuse ; 4-6 shots a day ? 


information obtained form Wife 


pt had 3 episodes of DUI , was incarcerated for 10 days in senior care 


had House arrest for 6 months 


currently under probation 


pt continued to drink , wife mentions that he has a hidden stack


wife works most part of the day , unable to keep track of his drinking habits 





- pt started  on  alcohol withdrawal protocol with gabapentin 


- no evidence of withdrawal 


-pt understands that -his excessive drinking may lead to life threatening 

consequences 


-wants to have follow up as out pt , does not think he can afford for inpatient 

Alcohol rehab 





ELECTROLYTE DERANGEMENTS; 


low K .low mg, low phos , low Ca 


possible due to long term alcohol abuse 


replaced 


follow labs 


Nephrology following , appreciate input 





HYPOVOLUMIC SHOCK 


- likely Hypovolemic from Poor Oral Intake, Concurrent use of Diuretics


-Presented with YI -Cr 12


required IV pressors -Levophed briefly in ICU , weaned off 


YI resolved with IV hydration 


Cr 0.9 now 


IVF D/sheela 


out pt diuretics discontinued 


given pt's hx of ETOH abuse , binge drinking , severe YI  on presentation -it 

may be prudent to have him off diuretics 


will D/w Nephrology 





- Adrenal Insufficiency/PANHYPOPITUITARISM 


   on chronic PO Cortef, s/p Pituitary Resection


-started on IV  stress dose Hydrocortisone in ICU 


-appreciate input form Critical care 


-pt has remains stable hemodynamically 


 


-weaned off IV steroid


 transitioned  to Oral steroid regimen -PO Cortef 10 mg in AM /20 mg in PM 


cont on Levothyroxine 


testosterone replacement 








ACUTE RENAL FAILURE ON CKD 3


Cr was 12 on presentation 


- likely Pre Renal  baseline cr 1.5 


Cr improved to baseline 


avoid NSAID's ,contrast studies 


- Renal US: no obstruction





d/ce  Lisinopril/HCTZ


  good urine output





- Nephro on board-appreciate input 











HX SEIZURES


- continue gabapentin 





DEPRESSION /ANXIETY DISORDER : 


pt mentions of having ongoing depressive symptoms with hopelessness 


feels worse as he remains at home all the time 


lost his Job 


denies of any suicidal ideation 


does not have any hobby 


used to work with his guns -which are taken away since he is in probation 


starts drinking heavily every time he feels depressed 


Cymbalta resumed 


feels anxious /panic attack without his Diazepam 


Alcohol abuse 


high risk for abuse /over dose of BDZ at home -





Psychiatry consulted -need recommendation for wean off BDZ and adjustment of 

SSRI for ongoing depression and anxiety symptom 








HTN 


- BP stable 


_Atenolol 50 mg daily resumed 


- outpatient regimen: benazepril/HCTZ,  D/sheela for YI 


-





DVT PROPHYLAXIS


- SQ Heparin 





DISPOSITION 





lives with wife at home





stable to be transferred out of ICU 


PT /OT eval prior to discharge


Vital Signs:











  Date Time  Temp Pulse Resp B/P Pulse Ox O2 Delivery O2 Flow Rate FiO2


 


3/9/17 16:42 37.0 82 24 175/91 98   


 


3/9/17 16:26 36.7 88 18  96   


 


3/9/17 15:18 36.7 88 18 166/90 96 Room Air  


 


3/9/17 12:00     96 Room Air  


 


3/9/17 10:54 36.6 82 20 162/91 96 Room Air  


 


3/9/17 10:13  80   97   


 


3/9/17 08:00     95 Room Air  


 


3/9/17 07:25 37.0 76 20 157/89 95 Room Air  


 


3/9/17 04:00      Room Air  


 


3/9/17 03:24 37.0 70 19 158/88 96 Room Air  


 


3/9/17 00:01 37.0 74 20 166/91 95 Room Air  


 


3/9/17 00:00      Room Air  


 


3/8/17 20:00      Room Air  


 


3/8/17 18:48 36.9 79 20 166/91 97 Room Air  








Lab Results:





Results Past 24 Hours








Test


  3/9/17


05:20 Range/Units


 


 


White Blood Count 10.75 4.8-10.8  K/uL


 


Red Blood Count 4.38 4.7-6.1  M/uL


 


Hemoglobin 12.7 14.0-18.0  g/dL


 


Hematocrit 39.3 42-52  %


 


Mean Corpuscular Volume 89.7   fL


 


Mean Corpuscular Hemoglobin 29.0 25-34  pg


 


Mean Corpuscular Hemoglobin


Concent 32.3


  32-36  g/dl


 


 


RDW Standard Deviation 53.3 36.4-46.3  fL


 


RDW Coefficient of Variation 16.4 11.5-14.5  %


 


Platelet Count 202 130-400  K/uL


 


Mean Platelet Volume 9.3 7.4-10.4  fL


 


Nucleated RBC Absolute Count


(auto) 0.31


  0-0  K/uL


 


 


Nucleated Red Blood Cells % 2.9  %


 


Sodium Level 145 136-145  mmol/L


 


Potassium Level 3.1 3.5-5.1  mmol/L


 


Chloride Level 110   mmol/L


 


Carbon Dioxide Level 26 21-32  mmol/L


 


Anion Gap 9.0 3-11  mmol/L


 


Blood Urea Nitrogen 23 7-18  mg/dl


 


Creatinine


  0.98


  0.60-1.40


mg/dl


 


Est Creatinine Clear Calc


Drug Dose 126.0


   ml/min


 


 


Estimated GFR (


American) 99.5


   


 


 


Estimated GFR (Non-


American 85.8


   


 


 


BUN/Creatinine Ratio 23.5 10-20  


 


Random Glucose 70 70-99  mg/dl


 


Calcium Level 7.3 8.5-10.1  mg/dl


 


Phosphorus Level 1.7 2.5-4.9  mg/dl


 


Magnesium Level 1.6 1.8-2.4  mg/dl

## 2017-03-09 NOTE — NEPHROLOGY PROGRESS NOTE
Nephrology Progress Note


Date of Service:


Mar 9, 2017.


Subjective


56 yo male with atn/volume depletion from overmedication of pain medications 

contributing to hypotension and hypocalcemia with hyperphosphatemia.  pt 

urinating well. has issues with anxiety and he is speaking with his sister to 

help him work it out.





Objective











  Date Time  Temp Pulse Resp B/P Pulse Ox O2 Delivery O2 Flow Rate FiO2


 


3/9/17 07:25 37.0 76 20 157/89 95 Room Air  


 


3/9/17 04:00      Room Air  


 


3/9/17 03:24 37.0 70 19 158/88 96 Room Air  


 


3/9/17 00:01 37.0 74 20 166/91 95 Room Air  


 


3/9/17 00:00      Room Air  


 


3/8/17 20:00      Room Air  


 


3/8/17 18:48 36.9 79 20 166/91 97 Room Air  


 


3/8/17 16:00      Room Air  


 


3/8/17 15:09 36.9 74 20 165/86 96 Room Air  


 


3/8/17 12:00      Room Air  


 


3/8/17 10:53 37.0 91 20 135/80 92 Room Air  








Physical Exam:


General-aaox3, obese


Eyes-no scleral icterus


ENT-mmm


Neck-supple


Lungs-cta


Heart-regular


Abdomen-bs+ s/nt/nd 


Extremities-+1 edema


Neuro-nonfocal





 Current Inpatient Medications








 Medications


  (Trade)  Dose


 Ordered  Sig/Joce


 Route  Start Time


 Stop Time Status Last Admin


Dose Admin


 


 Heparin Sodium


  (Porcine)


  (Heparin Sq 5000


 Unit/0.5ml)  5,000 unit  Q12H


 SQ  3/5/17 21:00


 4/4/17 20:59  3/9/17 07:50


5,000 UNIT


 


 Miscellaneous


 Information


  (Order Awaiting


 Action)  1 ea  QS


 N/A  3/5/17 16:00


 4/4/17 15:59  3/8/17 08:00


1 EA


 


 Testosterone


 Cypionate


  (Depo-Testosterone


 Inj)  100 mg  We@0900


 IM  3/8/17 09:00


 4/7/17 08:59  3/8/17 08:34


100 MG


 


 Ergocalciferol


  (Vitamin D Cap)  50,000


 interunit  DAILY@0900


 PO  3/5/17 17:00


 4/4/17 16:59  3/9/17 07:46


50,000 INTERUNIT


 


 Levothyroxine


 Sodium


  (Synthroid Tab)  300 mcg  DAILYBB


 PO  3/6/17 10:00


 4/5/17 09:59  3/9/17 06:11


300 MCG


 


 Docusate Sodium


  (coLACE CAP)  100 mg  BID


 PO  3/6/17 21:00


 4/5/17 20:59  3/8/17 20:44


100 MG


 


 Pantoprazole


 Sodium


  (Protonix Tab)  40 mg  DAILY


 PO  3/7/17 09:00


 4/6/17 08:59  3/9/17 07:46


40 MG


 


 Lorazepam


  (Ativan Tab)  1 mg  BID


 PO  3/6/17 21:00


 4/5/17 20:59  3/9/17 07:44


1 MG


 


 Morphine Sulfate


  (MoRPHine


 SULFATE INJ)  1 mg  Q3H  PRN


 IV  3/6/17 11:00


 3/20/17 10:59  3/7/17 04:20


1 MG


 


 Multivitamins


  (Multivitamin


 Tab)  1 tab  QAM


 PO  3/7/17 09:00


 4/6/17 08:59  3/9/17 07:47


1 TAB


 


 Thiamine HCl


  (Vitamin B-1 Tab)  100 mg  QAM


 PO  3/7/17 09:00


 4/6/17 08:59  3/9/17 07:47


100 MG


 


 Folic Acid


  (Folvite Tab)  1 mg  QAM


 PO  3/7/17 09:00


 4/6/17 08:59  3/9/17 07:47


1 MG


 


 Nicotine


  (Nicoderm Cq


 21MG Patch)  1 patch  QAM


 TD  3/7/17 09:00


 4/6/17 08:59  3/9/17 07:49


1 PATCH


 


 Miscellaneous


  (Remove Nicoderm


 Patch)  1 ea  HS


 N/A  3/6/17 21:00


 4/5/17 20:59  3/8/17 20:46


1 EA


 


 Lorazepam


  (Ativan Inj)  1 mg  ONE  PRN


 IV  3/6/17 18:30


     


 


 


 Acetaminophen/


 Hydrocodone Bitart


  (Norco 5/325 Tab)  1 tab  Q4H  PRN


 PO  3/7/17 10:30


 3/21/17 10:29  3/9/17 07:44


1 TAB


 


 Gabapentin


  (Neurontin Cap)  400 mg  Q12H


 PO  3/9/17 06:00


 3/9/17 18:01  3/9/17 06:11


400 MG


 


 Gabapentin 400 mg  400 mg  Q24H


 PO  3/10/17 18:00


 3/10/17 18:01   


 


 


 Hydrocortisone


 Sodium Succinate/


 Syringe


  (Solu-Cortef IV/


 Syringe)  0.5 ml @ 4


 mls/min  Q12H


 IV  3/9/17 06:00


 4/8/17 05:59  3/9/17 06:11


4 MLS/MIN


 


 Potassium


 Phosphate


  (Potassium


 Phosphate


 Replacement)  30 mmol  NOW  STAT


 IV  3/9/17 09:18


 3/9/17 09:19 UNV  


 


 


 Potassium


 Chloride 40 meq  40 meq  NOW  ONCE


 PO  3/9/17 09:30


 3/9/17 09:31 UNV  


 


 


 Magnesium Sulfate


 2 gm/Prmx  100 ml @ 


 100 mls/hr  NOW  ONCE


 IV  3/9/17 09:30


 3/9/17 10:29 UNV  


 


 


 Calcium Gluconate/


 Sodium Chloride


  (Calcium


 Gluconate 10%/Nss


 50ml)  70 ml @ 


 240 mls/hr  NOW  ONCE


 IV  3/9/17 09:30


 3/9/17 09:47 UNV  


 











Last 24 Hours








Test


  3/8/17


16:15 3/9/17


05:20


 


Sodium Level 145 mmol/L  145 mmol/L 


 


Potassium Level 3.3 mmol/L  3.1 mmol/L 


 


Chloride Level 110 mmol/L  110 mmol/L 


 


Carbon Dioxide Level 23 mmol/L  26 mmol/L 


 


Anion Gap 12.0 mmol/L  9.0 mmol/L 


 


Blood Urea Nitrogen 33 mg/dl  23 mg/dl 


 


Creatinine 1.30 mg/dl  0.98 mg/dl 


 


Est Creatinine Clear Calc


Drug Dose 93.9 ml/min 


  126.0 ml/min 


 


 


Estimated GFR (


American) 70.7 


  99.5 


 


 


Estimated GFR (Non-


American 61.0 


  85.8 


 


 


BUN/Creatinine Ratio 25.2  23.5 


 


Random Glucose 111 mg/dl  70 mg/dl 


 


Calcium Level 7.6 mg/dl  7.3 mg/dl 


 


Ionized Calcium 1.00 mmol/l  


 


Phosphorus Level 1.5 mg/dl  1.7 mg/dl 


 


Magnesium Level 1.6 mg/dl  1.6 mg/dl 


 


White Blood Count  10.75 K/uL 


 


Red Blood Count  4.38 M/uL 


 


Hemoglobin  12.7 g/dL 


 


Hematocrit  39.3 % 


 


Mean Corpuscular Volume  89.7 fL 


 


Mean Corpuscular Hemoglobin  29.0 pg 


 


Mean Corpuscular Hemoglobin


Concent 


  32.3 g/dl 


 


 


RDW Standard Deviation  53.3 fL 


 


RDW Coefficient of Variation  16.4 % 


 


Platelet Count  202 K/uL 


 


Mean Platelet Volume  9.3 fL 


 


Nucleated RBC Absolute Count


(auto) 


  0.31 K/uL 


 


 


Nucleated Red Blood Cells %  2.9 % 











Assessment & Plan


YI-non-oliguric-creatinine has improved quickly from 12 to 0.9. off fluids but 

starting to develop edema in the lower extremities. hesitant to give a dose of 

lasix till electrolytes are repleted.  lungs cta.  once electrolytes are better

, will give a dose of lasix. 





Hypocalcemia-on vitamin d daily. ionized calcium low. will give 2 grams of 

calcium gluconate.  





Hypophos-phos levels are low and will give another 30mmol of kphos.  





Hypokalemia-giving additonal 40meq of kdur and should hopefully start to 

improve.

## 2017-03-09 NOTE — DISCHARGE INSTRUCTIONS
Discharge Instructions


Date of Service


Mar 9, 2017.





Admission


Reason for Admission:  SHOCK





Discharge


Discharge Diagnosis / Problem:  DRUG OVERDOSE /ACUTE RENAL FAILURE /ALCHOHOL 

ABUSE





Discharge Goals


Goal(s):  Increase independence, Improve disease control





Activity Recommendations


Activity Limitations:  resume your previous activity





.





Instructions / Follow-Up


Instructions / Follow-Up


HOSPITAL FOLLOW UP WITH DR REBOLLEDO ON MONDAY 03/20/17 @ 1: 10PM  





LAB WORK ; 


BASIC METABOLIC PANEL , MAGNESIUM , PHOS LEVEL IN 3 DAYS 





FOLLOW UP WITH [facility] FOR RESULT 





DO NOT TAKE ALEVE , NAPROXEN , IBUPROFEN , ADVIL -AVOID NSAID'S WILL CAUSE 

DAMAGE TO YOUR KIDNEY





DO NOT DRINK ALCOHOL , 





NEED TO LIMIT PAIN AND ANXIETY MEDICATION FOR RISK OF DEPENDENCY AND  OVERDOSE \





Current Hospital Diet


Patient's current hospital diet: Renal Diet





Discharge Diet


Recommended Diet:  Regular Diet





Pending Studies


Studies pending at discharge:  yes


List of pending studies:  


LAB WORK : 





BASIC METABOLIC PANEL , MAGNESIUM , CALCIUM , PHOSPHORUS LEVEL





Medical Emergencies








.


Who to Call and When:





Medical Emergencies:  If at any time you feel your situation is an emergency, 

please call 911 immediately.





.





Non-Emergent Contact


Non-Emergency issues call your:  Primary Care Provider





.


.





"Provider Documentation" section prepared by Velvet Mercado.





VTE Core Measure


Inpt VTE Proph given/why not?:  Unfractionated heparin SQ

## 2017-03-10 VITALS
SYSTOLIC BLOOD PRESSURE: 160 MMHG | DIASTOLIC BLOOD PRESSURE: 80 MMHG | OXYGEN SATURATION: 97 % | HEART RATE: 70 BPM | TEMPERATURE: 97.7 F

## 2017-03-10 VITALS
TEMPERATURE: 97.88 F | OXYGEN SATURATION: 94 % | SYSTOLIC BLOOD PRESSURE: 181 MMHG | HEART RATE: 71 BPM | DIASTOLIC BLOOD PRESSURE: 93 MMHG

## 2017-03-10 VITALS
SYSTOLIC BLOOD PRESSURE: 168 MMHG | DIASTOLIC BLOOD PRESSURE: 89 MMHG | OXYGEN SATURATION: 95 % | HEART RATE: 76 BPM | TEMPERATURE: 98.42 F

## 2017-03-10 VITALS
OXYGEN SATURATION: 95 % | TEMPERATURE: 98.42 F | DIASTOLIC BLOOD PRESSURE: 97 MMHG | HEART RATE: 88 BPM | SYSTOLIC BLOOD PRESSURE: 160 MMHG

## 2017-03-10 VITALS — DIASTOLIC BLOOD PRESSURE: 85 MMHG | SYSTOLIC BLOOD PRESSURE: 157 MMHG

## 2017-03-10 VITALS — SYSTOLIC BLOOD PRESSURE: 145 MMHG | HEART RATE: 67 BPM | DIASTOLIC BLOOD PRESSURE: 76 MMHG

## 2017-03-10 VITALS — OXYGEN SATURATION: 94 %

## 2017-03-10 LAB
ANION GAP SERPL CALC-SCNC: 9 MMOL/L (ref 3–11)
BUN SERPL-MCNC: 14 MG/DL (ref 7–18)
BUN/CREAT SERPL: 14.2 (ref 10–20)
CALCIUM SERPL-MCNC: 7.5 MG/DL (ref 8.5–10.1)
CHLORIDE SERPL-SCNC: 111 MMOL/L (ref 98–107)
CO2 SERPL-SCNC: 26 MMOL/L (ref 21–32)
CREAT CL PREDICTED SERPL C-G-VRATE: 122.5 ML/MIN
CREAT SERPL-MCNC: 1 MG/DL (ref 0.6–1.4)
GLUCOSE SERPL-MCNC: 88 MG/DL (ref 70–99)
MAGNESIUM SERPL-MCNC: 1.5 MG/DL (ref 1.8–2.4)
PHOSPHATE SERPL-MCNC: 2 MG/DL (ref 2.5–4.9)
POTASSIUM SERPL-SCNC: 3.3 MMOL/L (ref 3.5–5.1)
SODIUM SERPL-SCNC: 146 MMOL/L (ref 136–145)

## 2017-03-10 RX ADMIN — Medication SCH TAB: at 07:45

## 2017-03-10 RX ADMIN — HYDROCODONE BITARTRATE AND ACETAMINOPHEN PRN TAB: 5; 325 TABLET ORAL at 20:53

## 2017-03-10 RX ADMIN — ALUMINUM ZIRCONIUM TRICHLOROHYDREX GLY SCH EA: 0.2 STICK TOPICAL at 15:45

## 2017-03-10 RX ADMIN — LISINOPRIL SCH MG: 20 TABLET ORAL at 07:57

## 2017-03-10 RX ADMIN — HYDROCODONE BITARTRATE AND ACETAMINOPHEN PRN TAB: 5; 325 TABLET ORAL at 15:44

## 2017-03-10 RX ADMIN — HYDROCORTISONE SCH MG: 10 TABLET ORAL at 07:44

## 2017-03-10 RX ADMIN — Medication SCH MG: at 07:45

## 2017-03-10 RX ADMIN — LEVOTHYROXINE SODIUM SCH MCG: 150 TABLET ORAL at 05:44

## 2017-03-10 RX ADMIN — LORAZEPAM SCH MG: 1 TABLET ORAL at 20:53

## 2017-03-10 RX ADMIN — PANTOPRAZOLE SCH MG: 40 TABLET, DELAYED RELEASE ORAL at 07:45

## 2017-03-10 RX ADMIN — HYDROCODONE BITARTRATE AND ACETAMINOPHEN PRN TAB: 5; 325 TABLET ORAL at 07:44

## 2017-03-10 RX ADMIN — NICOTINE SCH PATCH: 21 PATCH, EXTENDED RELEASE TRANSDERMAL at 07:46

## 2017-03-10 RX ADMIN — Medication PRN MG: at 23:22

## 2017-03-10 RX ADMIN — LORAZEPAM SCH MG: 1 TABLET ORAL at 07:43

## 2017-03-10 RX ADMIN — DIBASIC SODIUM PHOSPHATE, MONOBASIC POTASSIUM PHOSPHATE AND MONOBASIC SODIUM PHOSPHATE SCH TAB: 852; 155; 130 TABLET ORAL at 11:46

## 2017-03-10 RX ADMIN — HYDROCODONE BITARTRATE AND ACETAMINOPHEN PRN TAB: 5; 325 TABLET ORAL at 11:46

## 2017-03-10 RX ADMIN — ERGOCALCIFEROL SCH INTERUNIT: 1.25 CAPSULE, LIQUID FILLED ORAL at 07:45

## 2017-03-10 RX ADMIN — DOCUSATE SODIUM SCH MG: 100 CAPSULE, LIQUID FILLED ORAL at 20:54

## 2017-03-10 RX ADMIN — HYDROCORTISONE SCH MG: 10 TABLET ORAL at 20:52

## 2017-03-10 RX ADMIN — DIBASIC SODIUM PHOSPHATE, MONOBASIC POTASSIUM PHOSPHATE AND MONOBASIC SODIUM PHOSPHATE SCH TAB: 852; 155; 130 TABLET ORAL at 16:51

## 2017-03-10 RX ADMIN — DIBASIC SODIUM PHOSPHATE, MONOBASIC POTASSIUM PHOSPHATE AND MONOBASIC SODIUM PHOSPHATE SCH TAB: 852; 155; 130 TABLET ORAL at 07:44

## 2017-03-10 RX ADMIN — ALUMINUM ZIRCONIUM TRICHLOROHYDREX GLY SCH EA: 0.2 STICK TOPICAL at 07:20

## 2017-03-10 RX ADMIN — DIBASIC SODIUM PHOSPHATE, MONOBASIC POTASSIUM PHOSPHATE AND MONOBASIC SODIUM PHOSPHATE SCH TAB: 852; 155; 130 TABLET ORAL at 20:52

## 2017-03-10 RX ADMIN — DOCUSATE SODIUM SCH MG: 100 CAPSULE, LIQUID FILLED ORAL at 07:44

## 2017-03-10 RX ADMIN — HYDROCODONE BITARTRATE AND ACETAMINOPHEN PRN TAB: 5; 325 TABLET ORAL at 03:14
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
## 2017-03-10 NOTE — PROGRESS NOTE
Internal Med Progress Note


Date of Service:


Mar 10, 2017.


Provider Documentation:





SUBJECTIVE:





awake and alert , offers no complain 


wants to be discharged home 


discussed again regarding issues related to Alcohol and prescription drug abuse 


pt is aware of the potential life threatening condition that it may lead to 


willing  to seek for help on discharge 


pt is given information regarding inpatient Alcohol rehab /referral made 


he and his wife will look into options and which one of the facility will 

accept his insurance 





 





OBJECTIVE:





Vital Signs-as noted below





Exam:


General-no sign of distress , awake and alert 


Eyes-sclera non icteric 


Lungs-no rales or wheeze 


Heart-regular S1/S2


Abdomen-soft, non tender 


Extremities-rt knee mild tenderness, redness on rt 2nd toe with swelling and 

tenderness 


Neuro-AAo x3, no focal neurological deficit 





Lab data as noted below.


ASSESSMENT & PLAN:





CONFUSION /CHANGED MENTAL STATUS 


METABOLIC ENCEPHALOPATHY-


resolved 


presented with hypotension /altered mental status 


likely form toxic metabolic from Narcotic pain medications /Percocet use 


required Pressor support for marked hypotension -admitted to ICU 


Rt IJ central line placed by ICU team -removed 





- symptom resolved-mental status improved to baseline, alert communicating 


Acute renal failure /severe electrolyte derangement /hypotension corrected 


- CT head negative for acute change 





_d/w pt -can not recall how many Percocet he took


admits of drinking heavily 


-aware that it has been a problem , can not afford to go to Alcohol rehab -does 

not work /wife is the only person earning 


pt given  information /resources for Alcohol rehab 


willing to go to Alcohol rehab as out patient if insurance covers 








Chronic alcohol abuse/ NARCOTIC /SEDATIVE MEDICATION USE : 


hx of long term alcohol abuse ; 4-6 shots a day ? 


information obtained form Wife 


pt had 3 episodes of DUI , was incarcerated for 10 days in halfway 


was on  House arrest for 6 months 


currently under probation 


pt continued to drink , wife mentions that he has a hidden stack


wife works most part of the day , unable to keep track of his drinking habits 





- pt started  on  alcohol withdrawal protocol with gabapentin 


- no evidence of withdrawal 


-pt understands that -his excessive drinking may lead to life threatening 

consequences 


-wants to have follow up as out pt , does not think he can afford for inpatient 

Alcohol rehab 


-information for potential rehab center given to pt by CM , referral faxed to 

[facility] [place], [facility], [place], [place], 

and [place].  .  





ELECTROLYTE DERANGEMENTS; 


low K .low mg, low phos , low Ca 


possible due to long term alcohol abuse 


replaced 


follow labs 


Nephrology following , appreciate input 





HYPOVOLUMIC SHOCK 


-resolved, BP remains stable 


- likely Hypovolemic from Poor Oral Intake, Concurrent use of Diuretics


-Presented with YI -Cr 12


required IV pressors -Levophed briefly in ICU , weaned off 


YI resolved with IV hydration 


Cr 0.9 now 


IVF D/sheela 


given pt's hx of ETOH abuse , binge drinking , severe YI  on presentation -it 

may be prudent to have him off diuretics 


Atenolol resumed , 


hold off HCTZ for  persisted hypokalemia 





- Adrenal Insufficiency/PANHYPOPITUITARISM 


   on chronic PO Cortef, s/p Pituitary Resection


-started on IV  stress dose Hydrocortisone in ICU 


-appreciate input form Critical care 


-pt has remains stable hemodynamically 


 


on  Oral steroid regimen -PO Cortef 10 mg in AM /20 mg in PM 


cont on Levothyroxine 


testosterone replacement 








ACUTE RENAL FAILURE ON CKD 3


Cr was 12 on presentation 


- likely Pre Renal  baseline cr 1.5 


Cr improved to baseline 


avoid NSAID's ,contrast studies 


- Renal US: no obstruction





Lisinopril resumed 


cont to hold HCTZ for persisted hypokalemia 





- Nephro on board-appreciate input 











HX SEIZURES


- continue gabapentin 


-no seizure episode noted 





DEPRESSION /ANXIETY DISORDER : 


pt mentions of having ongoing depressive symptoms with hopelessness 


feels worse as he remains at home all the time 


lost his Job 


denies of any suicidal ideation 


does not have any hobby 


used to work with his guns -which are taken away since he is in probation 


starts drinking heavily every time he feels depressed 


Cymbalta resumed 


feels anxious /panic attack without his Diazepam 


Alcohol abuse 


high risk for abuse /over dose of BDZ at home -





Psychiatry consulted -appreciate input 


increased Cymbalta to 120 mg PO daily 


pt showing evidence of prescription controlled substance abuse -getting 

prescription for BDZ form different providers 





pt will not  be prescribed any controlled medications during hospital discharge 





hospital records /psychiatry evaluation faxed to pt's psychiatrist Dr Ambrocio 


pt should receive medications form one provide -preferably from his 

Psychiatrist to prevent abuse /misuse  





will notify pt's Family Physician -will benefit form  Contract for narcotics 

and BDZ 








HTN 


- BP stable 


_Atenolol 50 mg daily resumed 


-resumed ACEI 





DVT PROPHYLAXIS


- SQ Heparin 





DISPOSITION 





lives with wife at home





possible discharge home tomorrow 


pt will need continued followup with His Psychiatrist as out pt for depression /

anxiety management 


will benefit form out pt Psychotherapy to address prescription medications 

dependency 





informations given/Referral made  for out pt Alcohol rehab


Vital Signs:











  Date Time  Temp Pulse Resp B/P Pulse Ox O2 Delivery O2 Flow Rate FiO2


 


3/10/17 17:15  67  145/76    


 


3/10/17 16:00      Room Air  


 


3/10/17 14:35 36.5 70 17 160/80 97 Room Air  


 


3/10/17 09:45    157/85    


 


3/10/17 08:00     94 Room Air  


 


3/10/17 07:39 36.6 71 17 181/93 94 Room Air  


 


3/10/17 00:06 36.9 88 18 160/97 95 Room Air  


 


3/10/17 00:00      Room Air  


 


3/9/17 22:15  91  155/82    








Lab Results:





Results Past 24 Hours








Test


  3/10/17


06:02 3/10/17


07:45 3/10/17


11:38 Range/Units


 


 


Sodium Level 146   136-145  mmol/L


 


Potassium Level 3.3   3.5-5.1  mmol/L


 


Chloride Level 111     mmol/L


 


Carbon Dioxide Level 26   21-32  mmol/L


 


Anion Gap 9.0   3-11  mmol/L


 


Blood Urea Nitrogen 14   7-18  mg/dl


 


Creatinine


  1.00


  


  


  0.60-1.40


mg/dl


 


Est Creatinine Clear Calc


Drug Dose 122.5


  


  


   ml/min


 


 


Estimated GFR (


American) 97.1


  


  


   


 


 


Estimated GFR (Non-


American 83.8


  


  


   


 


 


BUN/Creatinine Ratio 14.2   10-20  


 


Random Glucose 88   70-99  mg/dl


 


Calcium Level 7.5   8.5-10.1  mg/dl


 


Phosphorus Level 2.0   2.5-4.9  mg/dl


 


Magnesium Level 1.5   1.8-2.4  mg/dl


 


Bedside Glucose  74 85 70-99  mg/dl

## 2017-03-10 NOTE — NEPHROLOGY PROGRESS NOTE
Nephrology Progress Note


Date of Service:


Mar 10, 2017.


Subjective


56 yo male with atn/volume depletion from overmedication of pain medications 

contributing to hypotension and hypocalcemia with hyperphosphatemia. pt having 

anxiety which is chronic. also dealing with knee pain and headaches.  walking 

around ok.





Objective











  Date Time  Temp Pulse Resp B/P Pulse Ox O2 Delivery O2 Flow Rate FiO2


 


3/10/17 00:06 36.9 88 18 160/97 95 Room Air  


 


3/10/17 00:00      Room Air  


 


3/9/17 22:15  91  155/82    


 


3/9/17 19:52  93 16 172/80 96 Room Air  


 


3/9/17 19:35  87 18 176/89 97 Room Air  


 


3/9/17 19:20  95 16 169/96 98   


 


3/9/17 19:05 37.0 85 20 177/89 96 Room Air  


 


3/9/17 16:45      Room Air  


 


3/9/17 16:42 37.0 82 24 175/91 98   


 


3/9/17 16:26 36.7 88 18  96   


 


3/9/17 16:20  95 16 169/96 98   


 


3/9/17 15:18 36.7 88 18 166/90 96 Room Air  


 


3/9/17 12:00     96 Room Air  


 


3/9/17 10:54 36.6 82 20 162/91 96 Room Air  


 


3/9/17 10:13  80   97   


 


3/9/17 08:00     95 Room Air  


 


3/9/17 07:25 37.0 76 20 157/89 95 Room Air  








Physical Exam:


General-aaox3, obese


Eyes-no scleral icterus


ENT-mmm


Neck-supple


Lungs-slight end expiratory wheeze


Heart-rrr


Abdomen-bs+ s/nt/nd 


Extremities-mild edema


Neuro-nonfocal





 Current Inpatient Medications








 Medications


  (Trade)  Dose


 Ordered  Sig/Joce


 Route  Start Time


 Stop Time Status Last Admin


Dose Admin


 


 Heparin Sodium


  (Porcine)


  (Heparin Sq 5000


 Unit/0.5ml)  5,000 unit  Q12H


 SQ  3/5/17 21:00


 4/4/17 20:59 Future Hold 3/9/17 07:50


5,000 UNIT


 


 Miscellaneous


 Information


  (Order Awaiting


 Action)  1 ea  QS


 N/A  3/5/17 16:00


 4/4/17 15:59  3/9/17 11:29


1 EA


 


 Testosterone


 Cypionate


  (Depo-Testosterone


 Inj)  100 mg  We@0900


 IM  3/8/17 09:00


 4/7/17 08:59  3/8/17 08:34


100 MG


 


 Ergocalciferol


  (Vitamin D Cap)  50,000


 interunit  DAILY@0900


 PO  3/5/17 17:00


 4/4/17 16:59  3/9/17 07:46


50,000 INTERUNIT


 


 Levothyroxine


 Sodium


  (Synthroid Tab)  300 mcg  DAILYBB


 PO  3/6/17 10:00


 4/5/17 09:59  3/10/17 05:44


300 MCG


 


 Docusate Sodium


  (coLACE CAP)  100 mg  BID


 PO  3/6/17 21:00


 4/5/17 20:59  3/8/17 20:44


100 MG


 


 Pantoprazole


 Sodium


  (Protonix Tab)  40 mg  DAILY


 PO  3/7/17 09:00


 4/6/17 08:59  3/9/17 07:46


40 MG


 


 Lorazepam


  (Ativan Tab)  1 mg  BID


 PO  3/6/17 21:00


 4/5/17 20:59  3/9/17 20:30


1 MG


 


 Morphine Sulfate


  (MoRPHine


 SULFATE INJ)  1 mg  Q3H  PRN


 IV  3/6/17 11:00


 3/20/17 10:59  3/7/17 04:20


1 MG


 


 Multivitamins


  (Multivitamin


 Tab)  1 tab  QAM


 PO  3/7/17 09:00


 4/6/17 08:59  3/9/17 07:47


1 TAB


 


 Thiamine HCl


  (Vitamin B-1 Tab)  100 mg  QAM


 PO  3/7/17 09:00


 4/6/17 08:59  3/9/17 07:47


100 MG


 


 Folic Acid


  (Folvite Tab)  1 mg  QAM


 PO  3/7/17 09:00


 4/6/17 08:59  3/9/17 07:47


1 MG


 


 Nicotine


  (Nicoderm Cq


 21MG Patch)  1 patch  QAM


 TD  3/7/17 09:00


 4/6/17 08:59  3/9/17 07:49


1 PATCH


 


 Miscellaneous


  (Remove Nicoderm


 Patch)  1 ea  HS


 N/A  3/6/17 21:00


 4/5/17 20:59  3/9/17 20:30


1 EA


 


 Lorazepam


  (Ativan Inj)  1 mg  ONE  PRN


 IV  3/6/17 18:30


     


 


 


 Acetaminophen/


 Hydrocodone Bitart


  (Norco 5/325 Tab)  1 tab  Q4H  PRN


 PO  3/7/17 10:30


 3/21/17 10:29  3/10/17 03:14


1 TAB


 


 Gabapentin


  (Neurontin Cap)  400 mg  Q24H


 PO  3/10/17 18:00


 3/10/17 18:01   


 


 


 Atenolol


  (Tenormin Tab)  50 mg  DAILY


 PO  3/10/17 09:00


 4/9/17 08:59   


 


 


 Duloxetine HCl


  (Cymbalta Cap)  30 mg  DAILY


 PO  3/10/17 09:00


 4/9/17 08:59   


 


 


 Duloxetine HCl


  (Cymbalta Cap)  60 mg  DAILY


 PO  3/10/17 09:00


 4/9/17 08:59   


 


 


 Hydrocortisone


  (Cortef Tab)  10 mg  QPM


 PO  3/9/17 21:00


 4/8/17 20:59  3/9/17 20:30


10 MG


 


 Hydrocortisone


  (Cortef Tab)  20 mg  QAM


 PO  3/10/17 09:00


 4/9/17 08:59   


 


 


 Zolpidem Tartrate


  (Ambien Tab)  10 mg  HS  PRN


 PO  3/9/17 17:00


 4/8/17 16:59  3/9/17 20:42


10 MG


 


 Hydralazine HCl


  (HydrALAZINE INJ)  10 mg  Q8  PRN


 IV.  3/9/17 19:15


 4/8/17 19:14  3/9/17 19:45


10 MG


 


 Potassium/


 Phosphorus/Sodium


  (Phospha 250


 Neutral


 155-852-130 Mg)  2 tab  QID


 PO  3/9/17 19:30


 4/8/17 19:29  3/9/17 20:29


2 TAB


 


 Potassium


 Phosphate


  (Potassium


 Phosphate


 Replacement)  15 mmol  NOW  STAT


 IV  3/10/17 07:10


 3/10/17 07:11 UNV  


 


 


 Potassium


 Chloride 40 meq  40 meq  NOW  ONCE


 PO  3/10/17 07:15


 3/10/17 07:16 UNV  


 


 


 Calcium Gluconate


 2000 mg/Sodium


 Chloride  70 ml @ 


 240 mls/hr  NOW  ONCE


 IV  3/10/17 07:15


 3/10/17 07:32   


 


 


 Potassium


 Phosphate/Sodium


 Chloride


  (Potassium


 Phosphate Inj/Nss


 250ml)  255 ml @ 


 102 mls/hr  TODAY@0800  ONCE


 IV  3/10/17 08:00


 3/10/17 10:29   


 











Last 24 Hours








Test


  3/9/17


18:45 3/10/17


06:02


 


Sodium Level 144 mmol/L  146 mmol/L 


 


Potassium Level 3.3 mmol/L  3.3 mmol/L 


 


Chloride Level 110 mmol/L  111 mmol/L 


 


Carbon Dioxide Level 25 mmol/L  26 mmol/L 


 


Anion Gap 9.0 mmol/L  9.0 mmol/L 


 


Blood Urea Nitrogen 19 mg/dl  14 mg/dl 


 


Creatinine 1.20 mg/dl  1.00 mg/dl 


 


Est Creatinine Clear Calc


Drug Dose 102.9 ml/min 


  122.5 ml/min 


 


 


Estimated GFR (


American) 77.9 


  97.1 


 


 


Estimated GFR (Non-


American 67.2 


  83.8 


 


 


BUN/Creatinine Ratio 15.9  14.2 


 


Random Glucose 85 mg/dl  88 mg/dl 


 


Calcium Level 7.9 mg/dl  7.5 mg/dl 


 


Ionized Calcium 1.04 mmol/l  


 


Phosphorus Level 2.0 mg/dl  2.0 mg/dl 


 


Magnesium Level 1.8 mg/dl  1.5 mg/dl 











Assessment & Plan


YI-non-oliguric-creatinine has improved back to baseline.  





Hypocalcemia-on vitamin d daily. continue to give calcium supplementation iv.  





Hypophos-phos levels are slowly improving.  would give additional 15 mmol of 

kphos today. 





Hypokalemia-k continue to be low, to give additional 40meq of kdur. 





htn-to restart lisinopril. continue to hold hctz with the low potassium.

## 2017-03-11 VITALS
DIASTOLIC BLOOD PRESSURE: 92 MMHG | TEMPERATURE: 98.24 F | SYSTOLIC BLOOD PRESSURE: 192 MMHG | HEART RATE: 84 BPM | OXYGEN SATURATION: 96 %

## 2017-03-11 VITALS
DIASTOLIC BLOOD PRESSURE: 76 MMHG | HEART RATE: 80 BPM | TEMPERATURE: 98.06 F | OXYGEN SATURATION: 96 % | SYSTOLIC BLOOD PRESSURE: 137 MMHG

## 2017-03-11 VITALS — SYSTOLIC BLOOD PRESSURE: 154 MMHG | DIASTOLIC BLOOD PRESSURE: 83 MMHG | HEART RATE: 74 BPM

## 2017-03-11 VITALS
DIASTOLIC BLOOD PRESSURE: 88 MMHG | TEMPERATURE: 98.24 F | OXYGEN SATURATION: 96 % | SYSTOLIC BLOOD PRESSURE: 150 MMHG | HEART RATE: 84 BPM

## 2017-03-11 VITALS — OXYGEN SATURATION: 96 %

## 2017-03-11 VITALS — DIASTOLIC BLOOD PRESSURE: 83 MMHG | SYSTOLIC BLOOD PRESSURE: 149 MMHG | HEART RATE: 70 BPM

## 2017-03-11 VITALS — SYSTOLIC BLOOD PRESSURE: 150 MMHG | DIASTOLIC BLOOD PRESSURE: 88 MMHG

## 2017-03-11 LAB
ANION GAP SERPL CALC-SCNC: 11 MMOL/L (ref 3–11)
BUN SERPL-MCNC: 10 MG/DL (ref 7–18)
BUN/CREAT SERPL: 11.2 (ref 10–20)
CALCIUM SERPL-MCNC: 7.8 MG/DL (ref 8.5–10.1)
CHLORIDE SERPL-SCNC: 108 MMOL/L (ref 98–107)
CO2 SERPL-SCNC: 24 MMOL/L (ref 21–32)
CREAT CL PREDICTED SERPL C-G-VRATE: 133.2 ML/MIN
CREAT SERPL-MCNC: 0.92 MG/DL (ref 0.6–1.4)
GLUCOSE SERPL-MCNC: 79 MG/DL (ref 70–99)
MAGNESIUM SERPL-MCNC: 1.3 MG/DL (ref 1.8–2.4)
PHOSPHATE SERPL-MCNC: 2.3 MG/DL (ref 2.5–4.9)
POTASSIUM SERPL-SCNC: 3.9 MMOL/L (ref 3.5–5.1)
SODIUM SERPL-SCNC: 143 MMOL/L (ref 136–145)

## 2017-03-11 RX ADMIN — LEVOTHYROXINE SODIUM SCH MCG: 150 TABLET ORAL at 06:24

## 2017-03-11 RX ADMIN — DIBASIC SODIUM PHOSPHATE, MONOBASIC POTASSIUM PHOSPHATE AND MONOBASIC SODIUM PHOSPHATE SCH TAB: 852; 155; 130 TABLET ORAL at 07:36

## 2017-03-11 RX ADMIN — LISINOPRIL SCH MG: 20 TABLET ORAL at 07:36

## 2017-03-11 RX ADMIN — HYDROCODONE BITARTRATE AND ACETAMINOPHEN PRN TAB: 5; 325 TABLET ORAL at 06:40

## 2017-03-11 RX ADMIN — DOCUSATE SODIUM SCH MG: 100 CAPSULE, LIQUID FILLED ORAL at 20:54

## 2017-03-11 RX ADMIN — DIBASIC SODIUM PHOSPHATE, MONOBASIC POTASSIUM PHOSPHATE AND MONOBASIC SODIUM PHOSPHATE SCH TAB: 852; 155; 130 TABLET ORAL at 20:55

## 2017-03-11 RX ADMIN — MAGNESIUM SULFATE IN DEXTROSE SCH MLS/HR: 10 INJECTION, SOLUTION INTRAVENOUS at 11:31

## 2017-03-11 RX ADMIN — MAGNESIUM SULFATE IN DEXTROSE SCH MLS/HR: 10 INJECTION, SOLUTION INTRAVENOUS at 09:59

## 2017-03-11 RX ADMIN — HYDROCODONE BITARTRATE AND ACETAMINOPHEN PRN TAB: 5; 325 TABLET ORAL at 11:09

## 2017-03-11 RX ADMIN — ALUMINUM ZIRCONIUM TRICHLOROHYDREX GLY SCH EA: 0.2 STICK TOPICAL at 23:37

## 2017-03-11 RX ADMIN — MAGNESIUM SULFATE IN DEXTROSE SCH MLS/HR: 10 INJECTION, SOLUTION INTRAVENOUS at 10:29

## 2017-03-11 RX ADMIN — Medication PRN MG: at 21:51

## 2017-03-11 RX ADMIN — Medication SCH MG: at 20:54

## 2017-03-11 RX ADMIN — DIBASIC SODIUM PHOSPHATE, MONOBASIC POTASSIUM PHOSPHATE AND MONOBASIC SODIUM PHOSPHATE SCH TAB: 852; 155; 130 TABLET ORAL at 16:43

## 2017-03-11 RX ADMIN — DIBASIC SODIUM PHOSPHATE, MONOBASIC POTASSIUM PHOSPHATE AND MONOBASIC SODIUM PHOSPHATE SCH TAB: 852; 155; 130 TABLET ORAL at 13:04

## 2017-03-11 RX ADMIN — HYDROCORTISONE SCH MG: 10 TABLET ORAL at 20:54

## 2017-03-11 RX ADMIN — ALUMINUM ZIRCONIUM TRICHLOROHYDREX GLY SCH EA: 0.2 STICK TOPICAL at 07:16

## 2017-03-11 RX ADMIN — ALUMINUM ZIRCONIUM TRICHLOROHYDREX GLY SCH EA: 0.2 STICK TOPICAL at 16:00

## 2017-03-11 RX ADMIN — ERGOCALCIFEROL SCH INTERUNIT: 1.25 CAPSULE, LIQUID FILLED ORAL at 07:36

## 2017-03-11 RX ADMIN — HYDROCODONE BITARTRATE AND ACETAMINOPHEN PRN TAB: 5; 325 TABLET ORAL at 15:50

## 2017-03-11 RX ADMIN — PANTOPRAZOLE SCH MG: 40 TABLET, DELAYED RELEASE ORAL at 07:36

## 2017-03-11 RX ADMIN — HYDROCORTISONE SCH MG: 10 TABLET ORAL at 07:36

## 2017-03-11 RX ADMIN — LORAZEPAM SCH MG: 1 TABLET ORAL at 07:35

## 2017-03-11 RX ADMIN — ALUMINUM ZIRCONIUM TRICHLOROHYDREX GLY SCH EA: 0.2 STICK TOPICAL at 00:00

## 2017-03-11 RX ADMIN — DULOXETINE SCH MG: 60 CAPSULE, DELAYED RELEASE PELLETS ORAL at 07:35

## 2017-03-11 RX ADMIN — HYDROCODONE BITARTRATE AND ACETAMINOPHEN PRN TAB: 5; 325 TABLET ORAL at 03:02

## 2017-03-11 RX ADMIN — CALCIUM CARBONATE SCH MG: 500 TABLET ORAL at 20:55

## 2017-03-11 RX ADMIN — Medication SCH MG: at 07:36

## 2017-03-11 RX ADMIN — HYDROCODONE BITARTRATE AND ACETAMINOPHEN PRN TAB: 5; 325 TABLET ORAL at 19:54

## 2017-03-11 RX ADMIN — Medication SCH TAB: at 07:36

## 2017-03-11 RX ADMIN — NICOTINE SCH PATCH: 21 PATCH, EXTENDED RELEASE TRANSDERMAL at 07:37

## 2017-03-11 RX ADMIN — DOCUSATE SODIUM SCH MG: 100 CAPSULE, LIQUID FILLED ORAL at 07:36

## 2017-03-11 RX ADMIN — LORAZEPAM SCH MG: 1 TABLET ORAL at 20:52

## 2017-03-11 NOTE — PROGRESS NOTE
Internal Med Progress Note


Date of Service:


Mar 11, 2017.


Provider Documentation:





SUBJECTIVE:





awake and alert , offers no complain 


feels fine 


waiting to be discharged home 


no sign of with drawl 


 





OBJECTIVE:





Vital Signs-as noted below





Exam:


General-no sign of distress , awake and alert 


Eyes-sclera non icteric 


Lungs-no rales or wheeze 


Heart-regular S1/S2


Abdomen-soft, non tender 


Extremities-rt knee mild tenderness, redness on rt 2nd toe with swelling and 

tenderness 


Neuro-AAo x3, no focal neurological deficit 





Lab data as noted below.


ASSESSMENT & PLAN:





CONFUSION /CHANGED MENTAL STATUS 


METABOLIC ENCEPHALOPATHY-


resolved 


presented with hypotension /altered mental status 


likely form toxic metabolic from Narcotic pain medications /Percocet use 


required Pressor support for marked hypotension -admitted to ICU 





- symptom resolved-mental status improved to baseline, alert communicating 


Acute renal failure /severe electrolyte derangement /hypotension corrected 


- CT head negative for acute change 





_d/w pt -can not recall how many Percocet he took


admits of drinking heavily 


-aware that it has been a problem , can not afford to go to Alcohol rehab -does 

not work /wife is the only person earning 


pt given  information /resources for Alcohol rehab 


willing to go to Alcohol rehab as out patient if insurance covers 








Chronic alcohol abuse/ NARCOTIC /SEDATIVE MEDICATION USE : 


hx of long term alcohol abuse ; 4-6 shots a day ? 


information obtained form Wife 


pt had 3 episodes of DUI , was incarcerated for 10 days in retirement 


was on  House arrest for 6 months 


currently under probation 


pt continued to drink , wife mentions that he has a hidden stack


wife works most part of the day , unable to keep track of his drinking habits 





- pt started  on  alcohol withdrawal protocol with gabapentin 


- no evidence of withdrawal 


-pt understands that -his excessive drinking may lead to life threatening 

consequences 


-wants to have follow up as out pt , does not think he can afford for inpatient 

Alcohol rehab 


-information for potential rehab center given to pt by CM , referral faxed to 

[facility] [place], [facility], [place], [place], 

and [facility].  .  





ELECTROLYTE DERANGEMENTS; 


low K .low mg, low phos , low Ca 


possible due to long term alcohol abuse 


replaced with IV replacement 


follow labs 


Nephrology following , appreciate input 


ordered PO Mg Oxide supplement 





HYPOVOLUMIC SHOCK 


-resolved, BP remains stable 


- likely Hypovolemic from Poor Oral Intake, Concurrent use of Diuretics


-Presented with YI -Cr 12


required IV pressors -Levophed briefly in ICU , weaned off 


YI resolved with IV hydration 


Cr 0.9 now 


IVF D/sheela 


given pt's hx of ETOH abuse , binge drinking , severe YI  on presentation -it 

may be prudent to have him off diuretics 


Atenolol resumed , 


hold off HCTZ for  persisted hypokalemia 





- Adrenal Insufficiency/PANHYPOPITUITARISM 


   on chronic PO Cortef, s/p Pituitary Resection


-started on IV  stress dose Hydrocortisone in ICU 


-appreciate input form Critical care 


-pt has remains stable hemodynamically 


 


on  Oral steroid regimen -PO Cortef 10 mg in AM /20 mg in PM 


cont on Levothyroxine 


testosterone replacement 








ACUTE RENAL FAILURE ON CKD 3


Cr was 12 on presentation 


- likely Pre Renal  baseline cr 1.5 


Cr improved to baseline 


avoid NSAID's ,contrast studies 


- Renal US: no obstruction





Lisinopril resumed 


cont to hold HCTZ for persisted hypokalemia 





- Nephro on board-appreciate input 











HX SEIZURES


- continue gabapentin 


-no seizure episode noted 





DEPRESSION /ANXIETY DISORDER : 


pt mentions of having ongoing depressive symptoms with hopelessness 


feels worse as he remains at home all the time 


lost his Job 


denies of any suicidal ideation 


does not have any hobby 


used to work with his guns -which are taken away since he is in probation 


starts drinking heavily every time he feels depressed 


Cymbalta resumed 


feels anxious /panic attack without his Diazepam 


Alcohol abuse 


high risk for abuse /over dose of BDZ at home -





Psychiatry consulted -appreciate input 


increased Cymbalta to 120 mg PO daily 


pt showing evidence of prescription controlled substance abuse -getting 

prescription for BDZ form different providers 





pt will not  be prescribed any controlled medications during hospital discharge 





hospital records /psychiatry evaluation faxed to pt's psychiatrist Dr Ambrocio 


pt should receive medications form one provide -preferably from his 

Psychiatrist to prevent abuse /misuse  





will notify pt's Family Physician -will benefit form  Contract for narcotics 

and BDZ 








HTN 


- BP stable 


_Atenolol 50 mg daily resumed 


-resumed ACEI 





DVT PROPHYLAXIS


- SQ Heparin 





DISPOSITION 





lives with wife at home





possible discharge home tomorrow 


pt will need continued followup with His Psychiatrist as out pt for depression /

anxiety management 


will benefit form out pt Psychotherapy to address prescription medications 

dependency 





informations given/Referral made  for out pt Alcohol rehab


Vital Signs:











  Date Time  Temp Pulse Resp B/P Pulse Ox O2 Delivery O2 Flow Rate FiO2


 


3/11/17 19:50  74  154/83    


 


3/11/17 15:40      Room Air  


 


3/11/17 14:39 36.7 80 20 137/76 96 Room Air  


 


3/11/17 10:22 36.8 84 20  96 Room Air  


 


3/11/17 09:15    150/88    


 


3/11/17 08:58     96 Room Air  


 


3/11/17 08:00     96 Room Air  


 


3/11/17 07:53 36.8 84 20 192/92 96 Room Air  


 


3/10/17 23:59      Room Air  


 


3/10/17 23:29 36.9 76 20 168/89 95 Room Air  








Lab Results:





Results Past 24 Hours








Test


  3/11/17


07:30 Range/Units


 


 


Sodium Level 143 136-145  mmol/L


 


Potassium Level 3.9 3.5-5.1  mmol/L


 


Chloride Level 108   mmol/L


 


Carbon Dioxide Level 24 21-32  mmol/L


 


Anion Gap 11.0 3-11  mmol/L


 


Blood Urea Nitrogen 10 7-18  mg/dl


 


Creatinine


  0.92


  0.60-1.40


mg/dl


 


Est Creatinine Clear Calc


Drug Dose 133.2


   ml/min


 


 


Estimated GFR (


American) 107.4


   


 


 


Estimated GFR (Non-


American 92.6


   


 


 


BUN/Creatinine Ratio 11.2 10-20  


 


Random Glucose 79 70-99  mg/dl


 


Calcium Level 7.8 8.5-10.1  mg/dl


 


Phosphorus Level 2.3 2.5-4.9  mg/dl


 


Magnesium Level 1.3 1.8-2.4  mg/dl

## 2017-03-11 NOTE — PSYCH MANAGEMENT PROGRESS NOTE
Psychiatry Miscellaneous


Date of Service:  Mar 11, 2017.


I personally participated in the medical decision making outlined in the 

initial consult by MIKAYLA Romero on 3/11/17.  Supplies of some of his 

controlled substances remain missing and liaison will continue to work with 

family to secure. Hasn't required IV Ativan for withdrawal.  Currently on 1 mg 

Ativan BID (just decreased), can taper further if BP/P remain stable, Valium 

likely still clearing.

## 2017-03-11 NOTE — NEPHROLOGY PROGRESS NOTE
Nephrology Progress Note


Date of Service:


Mar 11, 2017.


Subjective


56 yo male with atn/volume depletion from overmedication of pain medications 

contributing to hypotension and hypocalcemia with hyperphosphatemia. pt using 

an ice pack to help with his headaches.  pt interested in going home. restarted 

ace yesterday to help with bp control.





Objective











  Date Time  Temp Pulse Resp B/P Pulse Ox O2 Delivery O2 Flow Rate FiO2


 


3/11/17 08:58     96 Room Air  


 


3/11/17 07:53 36.8 84 20 192/92 96 Room Air  


 


3/10/17 23:59      Room Air  


 


3/10/17 23:29 36.9 76 20 168/89 95 Room Air  


 


3/10/17 20:00      Room Air  


 


3/10/17 17:15  67  145/76    


 


3/10/17 16:00      Room Air  


 


3/10/17 14:35 36.5 70 17 160/80 97 Room Air  


 


3/10/17 09:45    157/85    








Physical Exam:


General-aaox3, obese


Eyes-no scleral icterus


ENT-mmm


Neck-supple


Lungs-cta


Heart-regular


Abdomen-bs+ s/nt/nd 


Extremities-mild edema


Neuro-nonfocal





 Current Inpatient Medications








 Medications


  (Trade)  Dose


 Ordered  Sig/Joce


 Route  Start Time


 Stop Time Status Last Admin


Dose Admin


 


 Heparin Sodium


  (Porcine)


  (Heparin Sq 5000


 Unit/0.5ml)  5,000 unit  Q12H


 SQ  3/5/17 21:00


 4/4/17 20:59 Future Hold 3/9/17 07:50


5,000 UNIT


 


 Miscellaneous


 Information


  (Order Awaiting


 Action)  1 ea  QS


 N/A  3/5/17 16:00


 4/4/17 15:59  3/9/17 11:29


1 EA


 


 Testosterone


 Cypionate


  (Depo-Testosterone


 Inj)  100 mg  We@0900


 IM  3/8/17 09:00


 4/7/17 08:59  3/8/17 08:34


100 MG


 


 Ergocalciferol


  (Vitamin D Cap)  50,000


 interunit  DAILY@0900


 PO  3/5/17 17:00


 4/4/17 16:59  3/11/17 07:36


50,000 INTERUNIT


 


 Levothyroxine


 Sodium


  (Synthroid Tab)  300 mcg  DAILYBB


 PO  3/6/17 10:00


 4/5/17 09:59  3/11/17 06:24


300 MCG


 


 Docusate Sodium


  (coLACE CAP)  100 mg  BID


 PO  3/6/17 21:00


 4/5/17 20:59  3/11/17 07:36


100 MG


 


 Pantoprazole


 Sodium


  (Protonix Tab)  40 mg  DAILY


 PO  3/7/17 09:00


 4/6/17 08:59  3/11/17 07:36


40 MG


 


 Lorazepam


  (Ativan Tab)  1 mg  BID


 PO  3/6/17 21:00


 4/5/17 20:59  3/11/17 07:35


1 MG


 


 Morphine Sulfate


  (MoRPHine


 SULFATE INJ)  1 mg  Q3H  PRN


 IV  3/6/17 11:00


 3/20/17 10:59  3/7/17 04:20


1 MG


 


 Multivitamins


  (Multivitamin


 Tab)  1 tab  QAM


 PO  3/7/17 09:00


 4/6/17 08:59  3/11/17 07:36


1 TAB


 


 Thiamine HCl


  (Vitamin B-1 Tab)  100 mg  QAM


 PO  3/7/17 09:00


 4/6/17 08:59  3/11/17 07:36


100 MG


 


 Folic Acid


  (Folvite Tab)  1 mg  QAM


 PO  3/7/17 09:00


 4/6/17 08:59  3/11/17 07:36


1 MG


 


 Nicotine


  (Nicoderm Cq


 21MG Patch)  1 patch  QAM


 TD  3/7/17 09:00


 4/6/17 08:59  3/11/17 07:37


1 PATCH


 


 Miscellaneous


  (Remove Nicoderm


 Patch)  1 ea  HS


 N/A  3/6/17 21:00


 4/5/17 20:59  3/10/17 20:56


1 EA


 


 Acetaminophen/


 Hydrocodone Bitart


  (Norco 5/325 Tab)  1 tab  Q4H  PRN


 PO  3/7/17 10:30


 3/21/17 10:29  3/11/17 06:40


1 TAB


 


 Atenolol


  (Tenormin Tab)  50 mg  DAILY


 PO  3/10/17 09:00


 4/9/17 08:59  3/11/17 07:36


50 MG


 


 Hydrocortisone


  (Cortef Tab)  10 mg  QPM


 PO  3/9/17 21:00


 4/8/17 20:59  3/10/17 20:52


10 MG


 


 Hydrocortisone


  (Cortef Tab)  20 mg  QAM


 PO  3/10/17 09:00


 4/9/17 08:59  3/11/17 07:36


20 MG


 


 Zolpidem Tartrate


  (Ambien Tab)  10 mg  HS  PRN


 PO  3/9/17 17:00


 4/8/17 16:59  3/10/17 23:22


10 MG


 


 Hydralazine HCl


  (HydrALAZINE INJ)  10 mg  Q8  PRN


 IV.  3/9/17 19:15


 4/8/17 19:14  3/10/17 23:22


10 MG


 


 Potassium/


 Phosphorus/Sodium


  (Phospha 250


 Neutral


 155-852-130 Mg)  2 tab  QID


 PO  3/9/17 19:30


 4/8/17 19:29  3/11/17 07:36


2 TAB


 


 Lisinopril


  (Zestril Tab)  20 mg  QAM


 PO  3/10/17 09:00


 4/9/17 08:59  3/11/17 07:36


20 MG


 


 Acetaminophen


  (Tylenol Tab)  650 mg  Q4H  PRN


 PO  3/10/17 11:15


 4/9/17 11:14   


 


 


 Duloxetine HCl


 120 mg  120 mg  QAM


 PO  3/11/17 09:00


 4/10/17 08:59  3/11/17 07:35


120 MG


 


 Lorazepam 1 mg/


 Syringe  1 ml @ 1


 mls/min  ONE  PRN


 IV  3/10/17 17:30


     


 


 


 Magnesium Sulfate/


 Prmx


  (Magnesium


 Sulfate/Premixed


 D5W)  100 ml @ 


 100 mls/hr  Q1H


 IV  3/11/17 09:30


 3/11/17 12:29   


 


 


 Potassium/


 Phosphorus/Sodium


 2 tab  2 tab  QID


 PO  3/11/17 13:00


 4/10/17 12:59 UNV  


 


 


 Calcium Gluconate/


 Sodium Chloride


  (Calcium


 Gluconate 10%/Nss


 50ml)  70 ml @ 


 240 mls/hr  0930  ONCE


 IV  3/11/17 09:30


 3/11/17 09:47   


 











Last 24 Hours








Test


  3/10/17


11:38 3/11/17


07:30


 


Bedside Glucose 85 mg/dl  


 


Sodium Level  143 mmol/L 


 


Potassium Level  3.9 mmol/L 


 


Chloride Level  108 mmol/L 


 


Carbon Dioxide Level  24 mmol/L 


 


Anion Gap  11.0 mmol/L 


 


Blood Urea Nitrogen  10 mg/dl 


 


Creatinine  0.92 mg/dl 


 


Est Creatinine Clear Calc


Drug Dose 


  133.2 ml/min 


 


 


Estimated GFR (


American) 


  107.4 


 


 


Estimated GFR (Non-


American 


  92.6 


 


 


BUN/Creatinine Ratio  11.2 


 


Random Glucose  79 mg/dl 


 


Calcium Level  7.8 mg/dl 


 


Phosphorus Level  2.3 mg/dl 


 


Magnesium Level  1.3 mg/dl 











Assessment & Plan


YI-non-oliguric-creatinine has improved back to baseline.  





Hypocalcemia-on vitamin d daily. will give another two grams of iv calcium 

supplementation. started on 1500mg of tums at night to help with calcium. 





Hypophos-phos levels are improving. to start on oral sodium phos 

supplementation. 





hypomag-giving 3 grams of iv magnesium.  started on oral mag supplement 800 

bid. 





would like to have established good calcium, phos and mag levels prior to 

discharge without iv supplmentation help prior to discharge.

## 2017-03-12 VITALS
TEMPERATURE: 98.42 F | DIASTOLIC BLOOD PRESSURE: 85 MMHG | OXYGEN SATURATION: 95 % | HEART RATE: 66 BPM | SYSTOLIC BLOOD PRESSURE: 146 MMHG

## 2017-03-12 VITALS
HEART RATE: 72 BPM | SYSTOLIC BLOOD PRESSURE: 154 MMHG | TEMPERATURE: 98.06 F | OXYGEN SATURATION: 94 % | DIASTOLIC BLOOD PRESSURE: 87 MMHG

## 2017-03-12 VITALS — DIASTOLIC BLOOD PRESSURE: 84 MMHG | SYSTOLIC BLOOD PRESSURE: 158 MMHG | HEART RATE: 70 BPM

## 2017-03-12 VITALS — DIASTOLIC BLOOD PRESSURE: 96 MMHG | OXYGEN SATURATION: 94 % | SYSTOLIC BLOOD PRESSURE: 170 MMHG

## 2017-03-12 VITALS — OXYGEN SATURATION: 94 %

## 2017-03-12 LAB
ANION GAP SERPL CALC-SCNC: 9 MMOL/L (ref 3–11)
BUN SERPL-MCNC: 10 MG/DL (ref 7–18)
BUN/CREAT SERPL: 11.9 (ref 10–20)
CALCIUM SERPL-MCNC: 7.6 MG/DL (ref 8.5–10.1)
CHLORIDE SERPL-SCNC: 109 MMOL/L (ref 98–107)
CO2 SERPL-SCNC: 25 MMOL/L (ref 21–32)
CREAT CL PREDICTED SERPL C-G-VRATE: 147.7 ML/MIN
CREAT SERPL-MCNC: 0.83 MG/DL (ref 0.6–1.4)
GLUCOSE SERPL-MCNC: 73 MG/DL (ref 70–99)
MAGNESIUM SERPL-MCNC: 1.7 MG/DL (ref 1.8–2.4)
PHOSPHATE SERPL-MCNC: 2.4 MG/DL (ref 2.5–4.9)
POTASSIUM SERPL-SCNC: 3.9 MMOL/L (ref 3.5–5.1)
SODIUM SERPL-SCNC: 143 MMOL/L (ref 136–145)

## 2017-03-12 RX ADMIN — LORAZEPAM SCH MG: 1 TABLET ORAL at 08:48

## 2017-03-12 RX ADMIN — Medication PRN MG: at 22:37

## 2017-03-12 RX ADMIN — Medication SCH MG: at 08:18

## 2017-03-12 RX ADMIN — HYDROCORTISONE SCH MG: 10 TABLET ORAL at 08:16

## 2017-03-12 RX ADMIN — ERGOCALCIFEROL SCH INTERUNIT: 1.25 CAPSULE, LIQUID FILLED ORAL at 08:17

## 2017-03-12 RX ADMIN — DIBASIC SODIUM PHOSPHATE, MONOBASIC POTASSIUM PHOSPHATE AND MONOBASIC SODIUM PHOSPHATE SCH TAB: 852; 155; 130 TABLET ORAL at 16:49

## 2017-03-12 RX ADMIN — Medication SCH MG: at 08:17

## 2017-03-12 RX ADMIN — PANTOPRAZOLE SCH MG: 40 TABLET, DELAYED RELEASE ORAL at 08:18

## 2017-03-12 RX ADMIN — DIBASIC SODIUM PHOSPHATE, MONOBASIC POTASSIUM PHOSPHATE AND MONOBASIC SODIUM PHOSPHATE SCH TAB: 852; 155; 130 TABLET ORAL at 20:59

## 2017-03-12 RX ADMIN — DULOXETINE SCH MG: 60 CAPSULE, DELAYED RELEASE PELLETS ORAL at 08:16

## 2017-03-12 RX ADMIN — LISINOPRIL SCH MG: 20 TABLET ORAL at 08:17

## 2017-03-12 RX ADMIN — HYDROCODONE BITARTRATE AND ACETAMINOPHEN PRN TAB: 5; 325 TABLET ORAL at 04:45

## 2017-03-12 RX ADMIN — NICOTINE SCH PATCH: 21 PATCH, EXTENDED RELEASE TRANSDERMAL at 08:19

## 2017-03-12 RX ADMIN — Medication SCH MG: at 20:59

## 2017-03-12 RX ADMIN — DIBASIC SODIUM PHOSPHATE, MONOBASIC POTASSIUM PHOSPHATE AND MONOBASIC SODIUM PHOSPHATE SCH TAB: 852; 155; 130 TABLET ORAL at 13:20

## 2017-03-12 RX ADMIN — HYDROCODONE BITARTRATE AND ACETAMINOPHEN PRN TAB: 5; 325 TABLET ORAL at 22:37

## 2017-03-12 RX ADMIN — HYDROCODONE BITARTRATE AND ACETAMINOPHEN PRN TAB: 5; 325 TABLET ORAL at 13:43

## 2017-03-12 RX ADMIN — LORAZEPAM SCH MG: 1 TABLET ORAL at 20:59

## 2017-03-12 RX ADMIN — ALUMINUM ZIRCONIUM TRICHLOROHYDREX GLY SCH EA: 0.2 STICK TOPICAL at 15:56

## 2017-03-12 RX ADMIN — DOCUSATE SODIUM SCH MG: 100 CAPSULE, LIQUID FILLED ORAL at 08:19

## 2017-03-12 RX ADMIN — CALCIUM CARBONATE SCH MG: 500 TABLET ORAL at 21:00

## 2017-03-12 RX ADMIN — Medication SCH TAB: at 08:17

## 2017-03-12 RX ADMIN — ALUMINUM ZIRCONIUM TRICHLOROHYDREX GLY SCH EA: 0.2 STICK TOPICAL at 23:56

## 2017-03-12 RX ADMIN — DIBASIC SODIUM PHOSPHATE, MONOBASIC POTASSIUM PHOSPHATE AND MONOBASIC SODIUM PHOSPHATE SCH TAB: 852; 155; 130 TABLET ORAL at 08:17

## 2017-03-12 RX ADMIN — HYDROCORTISONE SCH MG: 10 TABLET ORAL at 20:59

## 2017-03-12 RX ADMIN — CALCIUM CARBONATE PRN MG: 500 TABLET ORAL at 19:25

## 2017-03-12 RX ADMIN — HYDROCODONE BITARTRATE AND ACETAMINOPHEN PRN TAB: 5; 325 TABLET ORAL at 18:43

## 2017-03-12 RX ADMIN — ALUMINUM ZIRCONIUM TRICHLOROHYDREX GLY SCH EA: 0.2 STICK TOPICAL at 08:00

## 2017-03-12 RX ADMIN — LEVOTHYROXINE SODIUM SCH MCG: 150 TABLET ORAL at 06:30

## 2017-03-12 RX ADMIN — HYDROCODONE BITARTRATE AND ACETAMINOPHEN PRN TAB: 5; 325 TABLET ORAL at 08:49

## 2017-03-12 RX ADMIN — DOCUSATE SODIUM SCH MG: 100 CAPSULE, LIQUID FILLED ORAL at 21:00

## 2017-03-12 NOTE — NEPHROLOGY PROGRESS NOTE
Nephrology Progress Note


Date of Service:


Mar 12, 2017.


Subjective


58 yo male with atn/volume depletion from overmedication of pain medications 

contributing to hypotension and hypocalcemia with hyperphosphatemia. pt 

continues to feel better and better each day. pt is very appreciative of the 

care he has recieved.





Objective











  Date Time  Temp Pulse Resp B/P Pulse Ox O2 Delivery O2 Flow Rate FiO2


 


3/12/17 08:00     94 Room Air  


 


3/12/17 07:43 36.7 72 18 154/87 94 Room Air  


 


3/11/17 23:59      Room Air  


 


3/11/17 22:57  70  149/83    


 


3/11/17 19:50  74  154/83    


 


3/11/17 15:40      Room Air  


 


3/11/17 14:39 36.7 80 20 137/76 96 Room Air  








Physical Exam:


General-aaox3, obese


Eyes-no scleral icterus


ENT-mmm


Neck-supple


Lungs-clear


Heart-rrr


Abdomen-bs+ s/nt/nd 


Extremities-mild edema right>left


Neuro-nonfocal





 Current Inpatient Medications








 Medications


  (Trade)  Dose


 Ordered  Sig/Joce


 Route  Start Time


 Stop Time Status Last Admin


Dose Admin


 


 Heparin Sodium


  (Porcine)


  (Heparin Sq 5000


 Unit/0.5ml)  5,000 unit  Q12H


 SQ  3/5/17 21:00


 4/4/17 20:59 Future Hold 3/9/17 07:50


5,000 UNIT


 


 Miscellaneous


 Information


  (Order Awaiting


 Action)  1 ea  QS


 N/A  3/5/17 16:00


 4/4/17 15:59  3/9/17 11:29


1 EA


 


 Testosterone


 Cypionate


  (Depo-Testosterone


 Inj)  100 mg  We@0900


 IM  3/8/17 09:00


 4/7/17 08:59  3/8/17 08:34


100 MG


 


 Ergocalciferol


  (Vitamin D Cap)  50,000


 interunit  DAILY@0900


 PO  3/5/17 17:00


 4/4/17 16:59  3/12/17 08:17


50,000 INTERUNIT


 


 Levothyroxine


 Sodium


  (Synthroid Tab)  300 mcg  DAILYBB


 PO  3/6/17 10:00


 4/5/17 09:59  3/12/17 06:30


300 MCG


 


 Docusate Sodium


  (coLACE CAP)  100 mg  BID


 PO  3/6/17 21:00


 4/5/17 20:59  3/12/17 08:19


100 MG


 


 Pantoprazole


 Sodium


  (Protonix Tab)  40 mg  DAILY


 PO  3/7/17 09:00


 4/6/17 08:59  3/12/17 08:18


40 MG


 


 Lorazepam


  (Ativan Tab)  1 mg  BID


 PO  3/6/17 21:00


 4/5/17 20:59  3/12/17 08:48


1 MG


 


 Morphine Sulfate


  (MoRPHine


 SULFATE INJ)  1 mg  Q3H  PRN


 IV  3/6/17 11:00


 3/20/17 10:59  3/7/17 04:20


1 MG


 


 Multivitamins


  (Multivitamin


 Tab)  1 tab  QAM


 PO  3/7/17 09:00


 4/6/17 08:59  3/12/17 08:17


1 TAB


 


 Thiamine HCl


  (Vitamin B-1 Tab)  100 mg  QAM


 PO  3/7/17 09:00


 4/6/17 08:59  3/12/17 08:17


100 MG


 


 Folic Acid


  (Folvite Tab)  1 mg  QAM


 PO  3/7/17 09:00


 4/6/17 08:59  3/12/17 08:18


1 MG


 


 Nicotine


  (Nicoderm Cq


 21MG Patch)  1 patch  QAM


 TD  3/7/17 09:00


 4/6/17 08:59  3/12/17 08:19


1 PATCH


 


 Miscellaneous


  (Remove Nicoderm


 Patch)  1 ea  HS


 N/A  3/6/17 21:00


 4/5/17 20:59  3/11/17 20:53


1 EA


 


 Acetaminophen/


 Hydrocodone Bitart


  (Norco 5/325 Tab)  1 tab  Q4H  PRN


 PO  3/7/17 10:30


 3/21/17 10:29  3/12/17 08:49


1 TAB


 


 Atenolol


  (Tenormin Tab)  50 mg  DAILY


 PO  3/10/17 09:00


 4/9/17 08:59  3/12/17 08:17


50 MG


 


 Hydrocortisone


  (Cortef Tab)  10 mg  QPM


 PO  3/9/17 21:00


 4/8/17 20:59  3/11/17 20:54


10 MG


 


 Hydrocortisone


  (Cortef Tab)  20 mg  QAM


 PO  3/10/17 09:00


 4/9/17 08:59  3/12/17 08:16


20 MG


 


 Zolpidem Tartrate


  (Ambien Tab)  10 mg  HS  PRN


 PO  3/9/17 17:00


 4/8/17 16:59  3/11/17 21:51


10 MG


 


 Hydralazine HCl


  (HydrALAZINE INJ)  10 mg  Q8  PRN


 IV.  3/9/17 19:15


 4/8/17 19:14  3/10/17 23:22


10 MG


 


 Potassium/


 Phosphorus/Sodium


  (Phospha 250


 Neutral


 155-852-130 Mg)  2 tab  QID


 PO  3/9/17 19:30


 4/8/17 19:29  3/12/17 08:17


2 TAB


 


 Lisinopril


  (Zestril Tab)  20 mg  QAM


 PO  3/10/17 09:00


 4/9/17 08:59  3/12/17 08:17


20 MG


 


 Acetaminophen


  (Tylenol Tab)  650 mg  Q4H  PRN


 PO  3/10/17 11:15


 4/9/17 11:14   


 


 


 Duloxetine HCl


 120 mg  120 mg  QAM


 PO  3/11/17 09:00


 4/10/17 08:59  3/12/17 08:16


120 MG


 


 Lorazepam/Syringe


  (Ativan Inj/


 Syringe)  1 ml @ 1


 mls/min  ONE  PRN


 IV  3/10/17 17:30


     


 


 


 Calcium Carbonate


  (Tums Chew Tab)  1,500 mg  HS


 PO  3/11/17 21:00


 4/10/17 20:59  3/11/17 20:55


1,500 MG


 


 Magnesium Oxide


  (Mag-Ox Tab)  800 mg  BID


 PO  3/11/17 21:00


 4/10/17 20:59  3/12/17 08:18


800 MG











Last 24 Hours








Test


  3/12/17


06:37


 


Sodium Level 143 mmol/L 


 


Potassium Level 3.9 mmol/L 


 


Chloride Level 109 mmol/L 


 


Carbon Dioxide Level 25 mmol/L 


 


Anion Gap 9.0 mmol/L 


 


Blood Urea Nitrogen 10 mg/dl 


 


Creatinine 0.83 mg/dl 


 


Est Creatinine Clear Calc


Drug Dose 147.7 ml/min 


 


 


Estimated GFR (


American) 114.0 


 


 


Estimated GFR (Non-


American 98.4 


 


 


BUN/Creatinine Ratio 11.9 


 


Random Glucose 73 mg/dl 


 


Calcium Level 7.6 mg/dl 


 


Ionized Calcium 1.02 mmol/l 


 


Phosphorus Level 2.4 mg/dl 


 


Magnesium Level 1.7 mg/dl 











Assessment & Plan


YI-non-oliguric-creatinine has improved back to baseline. resolved. 





Hypocalcemia-on vitamin d daily and 1500mg of tums at night. may need to 

increase oral supplement to tid in between meals. will give additional 2 grams 

of calcium gluconate today. 





Hypophos-phos levels are improving.  on oral  phos supplementation. 





hypomag-on oral mag and recieved another dose of iv mag today. 





would repeat bmp, phos and mag about three days after discharge and continue 

oral supplements of oral mag and oral phos and oral calcium for now. 





Currently off hctz component of his bp medications and does have swelling in 

right leg.  would like electrolytes stable as outpt before restarting his home 

benazepril/hctz medication.

## 2017-03-12 NOTE — PROGRESS NOTE
Internal Med Progress Note


Date of Service:


Mar 12, 2017.


Provider Documentation:





SUBJECTIVE:





continues to feel well 


wants to go to inpatient Alcohol rehab when discharged 


no SOB , no fever or chills 


 





OBJECTIVE:





Vital Signs-as noted below





Exam:


General-no sign of distress , awake and alert 


Eyes-sclera non icteric 


Lungs-no rales or wheeze 


Heart-regular S1/S2


Abdomen-soft, non tender 


Extremities-rt knee mild tenderness, redness on rt 2nd toe with swelling and 

tenderness 


Neuro-AAo x3, no focal neurological deficit 





Lab data as noted below.


ASSESSMENT & PLAN:





CONFUSION /CHANGED MENTAL STATUS 


METABOLIC ENCEPHALOPATHY-


resolved 


presented with hypotension /altered mental status 


likely form toxic metabolic from Narcotic pain medications /Percocet use 


required Pressor support for marked hypotension -admitted to ICU 





- symptom resolved-mental status improved to baseline, alert communicating 


Acute renal failure /severe electrolyte derangement /hypotension corrected 


- CT head negative for acute change 





_d/w pt -can not recall how many Percocet he took


admits of drinking heavily 


-aware that it has been a problem , can not afford to go to Alcohol rehab -does 

not work /wife is the only person earning 


pt given  information /resources for Alcohol rehab 


willing to go to Alcohol rehab as out patient if insurance covers 








Chronic alcohol abuse/ NARCOTIC /SEDATIVE MEDICATION USE : 


hx of long term alcohol abuse ; 4-6 shots a day ? 


information obtained form Wife 


pt had 3 episodes of DUI , was [facility] [place] [facility] days in [facility] 


was on  House arrest for 6 months 


currently under probation 


pt continued to drink , wife mentions that he has a hidden stack


wife works most part of the day , unable to keep track of his drinking habits 





- pt started  on  alcohol withdrawal protocol with gabapentin 


- no evidence of withdrawal 


-pt understands that -his excessive drinking may lead to life threatening 

consequences 


-wants to have follow up as out pt , does not think he can afford for inpatient 

Alcohol rehab 


-information for potential rehab center given to pt by CM , referral faxed to 

[facility] [place], [facility], [place], [place], 

and [facility].  .  





ELECTROLYTE DERANGEMENTS; 


low K .low mg, low phos , low Ca 


possible due to long term alcohol abuse 


replaced with IV replacement 


follow labs 


Nephrology following , appreciate input 


ordered PO Mg Oxide supplement 








HYPOVOLUMIC SHOCK 


-resolved, BP remains stable 


- likely Hypovolemic from Poor Oral Intake, Concurrent use of Diuretics


-Presented with YI -Cr 12


required IV pressors -Levophed briefly in ICU , weaned off 


YI resolved with IV hydration 


Cr 0.9 now 


IVF D/sheela 


given pt's hx of ETOH abuse , binge drinking , severe YI  on presentation -it 

may be prudent to have him off diuretics 


Atenolol resumed , 


hold off HCTZ for  persisted hypokalemia 





- Adrenal Insufficiency/PANHYPOPITUITARISM 


   on chronic PO Cortef, s/p Pituitary Resection


-started on IV  stress dose Hydrocortisone in ICU 


-appreciate input form Critical care 


-pt has remains stable hemodynamically 


 


on  Oral steroid regimen -PO Cortef 10 mg in AM /20 mg in PM 


cont on Levothyroxine 


testosterone replacement 








ACUTE RENAL FAILURE ON CKD 3


Cr was 12 on presentation 


- likely Pre Renal  baseline cr 1.5 


Cr improved to baseline 


avoid NSAID's ,contrast studies 


- Renal US: no obstruction





Lisinopril resumed 


cont to hold HCTZ for persisted hypokalemia 





- Nephro on board-appreciate input 











HX SEIZURES


- continue gabapentin 


-no seizure episode noted 





DEPRESSION /ANXIETY DISORDER : 


pt mentions of having ongoing depressive symptoms with hopelessness 


feels worse as he remains at home all the time 


lost his Job 


denies of any suicidal ideation 


does not have any hobby 


used to work with his guns -which are taken away since he is in probation 


starts drinking heavily every time he feels depressed 


Cymbalta resumed 


feels anxious /panic attack without his Diazepam 


Alcohol abuse 


high risk for abuse /over dose of BDZ at home -





Psychiatry consulted -appreciate input 


increased Cymbalta to 120 mg PO daily 


pt showing evidence of prescription controlled substance abuse -getting 

prescription for BDZ form different providers 





pt will not  be prescribed any controlled medications during hospital discharge 





hospital records /psychiatry evaluation faxed to pt's psychiatrist Dr Ambrocio 


pt should receive medications form one provide -preferably from his 

Psychiatrist to prevent abuse /misuse  





will notify pt's Family Physician -will benefit form  Contract for narcotics 

and BDZ 








HTN 


- BP stable 


_Atenolol 50 mg daily resumed 


-resumed ACEI 





DVT PROPHYLAXIS


- SQ Heparin 





DISPOSITION 





lives with wife at home





possible discharge home in 1 -2 days 





pt will need continued followup with His Psychiatrist as out pt for depression /

anxiety management 


will benefit form out pt Psychotherapy to address prescription medications 

dependency 





informations given/Referral made  for out pt Alcohol rehab


Vital Signs:











  Date Time  Temp Pulse Resp B/P Pulse Ox O2 Delivery O2 Flow Rate FiO2


 


3/13/17 19:24  66  136/84    


 


3/13/17 16:00      Room Air  


 


3/13/17 15:02 37.0 71 16 139/79 93 Room Air  


 


3/13/17 09:03  75  120/74    


 


3/13/17 08:25      Room Air  


 


3/13/17 07:18 36.9 58 16 159/86 95 Room Air  


 


3/12/17 23:59      Room Air  


 


3/12/17 23:17  70  158/84    


 


3/12/17 23:07    170/96 94 Room Air  


 


3/12/17 20:00      Room Air  








Lab Results:





Results Past 24 Hours








Test


  3/13/17


06:25 Range/Units


 


 


Sodium Level 142 136-145  mmol/L


 


Potassium Level 4.0 3.5-5.1  mmol/L


 


Chloride Level 108   mmol/L


 


Carbon Dioxide Level 27 21-32  mmol/L


 


Anion Gap 7.0 3-11  mmol/L


 


Blood Urea Nitrogen 7 7-18  mg/dl


 


Creatinine


  1.00


  0.60-1.40


mg/dl


 


Est Creatinine Clear Calc


Drug Dose 122.6


   ml/min


 


 


Estimated GFR (


American) 97.1


   


 


 


Estimated GFR (Non-


American 83.8


   


 


 


BUN/Creatinine Ratio 7.1 10-20  


 


Random Glucose 79 70-99  mg/dl


 


Calcium Level 7.8 8.5-10.1  mg/dl


 


Ionized Calcium


  1.06


  1.12-1.32


mmol/l


 


Phosphorus Level 2.6 2.5-4.9  mg/dl


 


Magnesium Level 1.4 1.8-2.4  mg/dl

## 2017-03-13 VITALS
SYSTOLIC BLOOD PRESSURE: 159 MMHG | DIASTOLIC BLOOD PRESSURE: 86 MMHG | OXYGEN SATURATION: 95 % | HEART RATE: 58 BPM | TEMPERATURE: 98.42 F

## 2017-03-13 VITALS — SYSTOLIC BLOOD PRESSURE: 120 MMHG | HEART RATE: 75 BPM | DIASTOLIC BLOOD PRESSURE: 74 MMHG

## 2017-03-13 VITALS — HEART RATE: 66 BPM | SYSTOLIC BLOOD PRESSURE: 136 MMHG | DIASTOLIC BLOOD PRESSURE: 84 MMHG

## 2017-03-13 VITALS
TEMPERATURE: 98.6 F | OXYGEN SATURATION: 93 % | SYSTOLIC BLOOD PRESSURE: 139 MMHG | DIASTOLIC BLOOD PRESSURE: 79 MMHG | HEART RATE: 71 BPM

## 2017-03-13 LAB
ANION GAP SERPL CALC-SCNC: 7 MMOL/L (ref 3–11)
BUN SERPL-MCNC: 7 MG/DL (ref 7–18)
BUN/CREAT SERPL: 7.1 (ref 10–20)
CALCIUM SERPL-MCNC: 7.8 MG/DL (ref 8.5–10.1)
CHLORIDE SERPL-SCNC: 108 MMOL/L (ref 98–107)
CO2 SERPL-SCNC: 27 MMOL/L (ref 21–32)
CREAT CL PREDICTED SERPL C-G-VRATE: 122.6 ML/MIN
CREAT SERPL-MCNC: 1 MG/DL (ref 0.6–1.4)
GLUCOSE SERPL-MCNC: 79 MG/DL (ref 70–99)
MAGNESIUM SERPL-MCNC: 1.4 MG/DL (ref 1.8–2.4)
PHOSPHATE SERPL-MCNC: 2.6 MG/DL (ref 2.5–4.9)
POTASSIUM SERPL-SCNC: 4 MMOL/L (ref 3.5–5.1)
SODIUM SERPL-SCNC: 142 MMOL/L (ref 136–145)

## 2017-03-13 RX ADMIN — HYDROCODONE BITARTRATE AND ACETAMINOPHEN PRN TAB: 5; 325 TABLET ORAL at 08:49

## 2017-03-13 RX ADMIN — LORAZEPAM SCH MG: 1 TABLET ORAL at 21:06

## 2017-03-13 RX ADMIN — MAGNESIUM SULFATE IN DEXTROSE SCH MLS/HR: 10 INJECTION, SOLUTION INTRAVENOUS at 09:02

## 2017-03-13 RX ADMIN — DIBASIC SODIUM PHOSPHATE, MONOBASIC POTASSIUM PHOSPHATE AND MONOBASIC SODIUM PHOSPHATE SCH TAB: 852; 155; 130 TABLET ORAL at 13:30

## 2017-03-13 RX ADMIN — ERGOCALCIFEROL SCH INTERUNIT: 1.25 CAPSULE, LIQUID FILLED ORAL at 08:27

## 2017-03-13 RX ADMIN — Medication SCH MG: at 21:07

## 2017-03-13 RX ADMIN — Medication SCH TAB: at 08:28

## 2017-03-13 RX ADMIN — DIBASIC SODIUM PHOSPHATE, MONOBASIC POTASSIUM PHOSPHATE AND MONOBASIC SODIUM PHOSPHATE SCH TAB: 852; 155; 130 TABLET ORAL at 08:30

## 2017-03-13 RX ADMIN — HYDROCODONE BITARTRATE AND ACETAMINOPHEN PRN TAB: 5; 325 TABLET ORAL at 17:51

## 2017-03-13 RX ADMIN — DOCUSATE SODIUM SCH MG: 100 CAPSULE, LIQUID FILLED ORAL at 08:27

## 2017-03-13 RX ADMIN — LEVOTHYROXINE SODIUM SCH MCG: 150 TABLET ORAL at 06:28

## 2017-03-13 RX ADMIN — CALCIUM CARBONATE SCH MG: 500 TABLET ORAL at 13:30

## 2017-03-13 RX ADMIN — LISINOPRIL SCH MG: 20 TABLET ORAL at 08:30

## 2017-03-13 RX ADMIN — HYDROCODONE BITARTRATE AND ACETAMINOPHEN PRN TAB: 5; 325 TABLET ORAL at 13:36

## 2017-03-13 RX ADMIN — HYDROCODONE BITARTRATE AND ACETAMINOPHEN PRN TAB: 5; 325 TABLET ORAL at 22:34

## 2017-03-13 RX ADMIN — DOCUSATE SODIUM SCH MG: 100 CAPSULE, LIQUID FILLED ORAL at 21:07

## 2017-03-13 RX ADMIN — HYDROCODONE BITARTRATE AND ACETAMINOPHEN PRN TAB: 5; 325 TABLET ORAL at 04:18

## 2017-03-13 RX ADMIN — Medication SCH MG: at 08:27

## 2017-03-13 RX ADMIN — DIBASIC SODIUM PHOSPHATE, MONOBASIC POTASSIUM PHOSPHATE AND MONOBASIC SODIUM PHOSPHATE SCH TAB: 852; 155; 130 TABLET ORAL at 21:08

## 2017-03-13 RX ADMIN — NICOTINE SCH PATCH: 21 PATCH, EXTENDED RELEASE TRANSDERMAL at 08:27

## 2017-03-13 RX ADMIN — HYDROCORTISONE SCH MG: 10 TABLET ORAL at 21:06

## 2017-03-13 RX ADMIN — CALCIUM CARBONATE SCH MG: 500 TABLET ORAL at 21:05

## 2017-03-13 RX ADMIN — PANTOPRAZOLE SCH MG: 40 TABLET, DELAYED RELEASE ORAL at 08:28

## 2017-03-13 RX ADMIN — Medication PRN MG: at 21:05

## 2017-03-13 RX ADMIN — LORAZEPAM SCH MG: 1 TABLET ORAL at 08:49

## 2017-03-13 RX ADMIN — DIBASIC SODIUM PHOSPHATE, MONOBASIC POTASSIUM PHOSPHATE AND MONOBASIC SODIUM PHOSPHATE SCH TAB: 852; 155; 130 TABLET ORAL at 16:20

## 2017-03-13 RX ADMIN — Medication SCH MG: at 08:29

## 2017-03-13 RX ADMIN — HYDROCORTISONE SCH MG: 10 TABLET ORAL at 08:29

## 2017-03-13 RX ADMIN — CALCIUM CARBONATE SCH MG: 500 TABLET ORAL at 08:26

## 2017-03-13 RX ADMIN — MAGNESIUM SULFATE IN DEXTROSE SCH MLS/HR: 10 INJECTION, SOLUTION INTRAVENOUS at 10:25

## 2017-03-13 RX ADMIN — CALCIUM CARBONATE PRN MG: 500 TABLET ORAL at 19:20

## 2017-03-13 RX ADMIN — DULOXETINE SCH MG: 60 CAPSULE, DELAYED RELEASE PELLETS ORAL at 08:28

## 2017-03-13 NOTE — NEPHROLOGY PROGRESS NOTE
Nephrology Progress Note


Date of Service:


Mar 13, 2017.


Subjective


no c/o fasciculations or weakness; ongoing R knee pain; not dypsneic; edema 

better but some pedal/ "toe" edema present; transient N yesterday; none now; no 

sob or chest pain





Objective











  Date Time  Temp Pulse Resp B/P Pulse Ox O2 Delivery O2 Flow Rate FiO2


 


3/13/17 07:18 36.9 58 16 159/86 95 Room Air  


 


3/12/17 23:59      Room Air  


 


3/12/17 23:17  70  158/84    


 


3/12/17 23:07    170/96 94 Room Air  


 


3/12/17 20:00      Room Air  


 


3/12/17 16:04 36.9 66 20 146/85 95 Room Air  


 


3/12/17 16:00      Room Air  








Physical Exam:


General-aaox3, obese, on RA, maneuvers readily for exam


Eyes-no scleral icterus


ENT-mmm


Neck-supple


Lungs-clear but diminished


Heart-rrr


Abdomen-bs+ s/nt/nd 


Extremities-no edema


Neuro-nonfocal





 Current Inpatient Medications








 Medications


  (Trade)  Dose


 Ordered  Sig/Joce


 Route  Start Time


 Stop Time Status Last Admin


Dose Admin


 


 Heparin Sodium


  (Porcine)


  (Heparin Sq 5000


 Unit/0.5ml)  5,000 unit  Q12H


 SQ  3/5/17 21:00


 4/4/17 20:59 Future Hold 3/9/17 07:50


5,000 UNIT


 


 Miscellaneous


 Information


  (Order Awaiting


 Action)  1 ea  QS


 N/A  3/5/17 16:00


 4/4/17 15:59  3/9/17 11:29


1 EA


 


 Testosterone


 Cypionate


  (Depo-Testosterone


 Inj)  100 mg  We@0900


 IM  3/8/17 09:00


 4/7/17 08:59  3/8/17 08:34


100 MG


 


 Ergocalciferol


  (Vitamin D Cap)  50,000


 interunit  DAILY@0900


 PO  3/5/17 17:00


 4/4/17 16:59  3/12/17 08:17


50,000 INTERUNIT


 


 Levothyroxine


 Sodium


  (Synthroid Tab)  300 mcg  DAILYBB


 PO  3/6/17 10:00


 4/5/17 09:59  3/13/17 06:28


300 MCG


 


 Docusate Sodium


  (coLACE CAP)  100 mg  BID


 PO  3/6/17 21:00


 4/5/17 20:59  3/12/17 08:19


100 MG


 


 Pantoprazole


 Sodium


  (Protonix Tab)  40 mg  DAILY


 PO  3/7/17 09:00


 4/6/17 08:59  3/12/17 08:18


40 MG


 


 Lorazepam


  (Ativan Tab)  1 mg  BID


 PO  3/6/17 21:00


 4/5/17 20:59  3/12/17 20:59


1 MG


 


 Morphine Sulfate


  (MoRPHine


 SULFATE INJ)  1 mg  Q3H  PRN


 IV  3/6/17 11:00


 3/20/17 10:59  3/7/17 04:20


1 MG


 


 Multivitamins


  (Multivitamin


 Tab)  1 tab  QAM


 PO  3/7/17 09:00


 4/6/17 08:59  3/12/17 08:17


1 TAB


 


 Thiamine HCl


  (Vitamin B-1 Tab)  100 mg  QAM


 PO  3/7/17 09:00


 4/6/17 08:59  3/12/17 08:17


100 MG


 


 Folic Acid


  (Folvite Tab)  1 mg  QAM


 PO  3/7/17 09:00


 4/6/17 08:59  3/12/17 08:18


1 MG


 


 Nicotine


  (Nicoderm Cq


 21MG Patch)  1 patch  QAM


 TD  3/7/17 09:00


 4/6/17 08:59  3/12/17 08:19


1 PATCH


 


 Miscellaneous


  (Remove Nicoderm


 Patch)  1 ea  HS


 N/A  3/6/17 21:00


 4/5/17 20:59  3/12/17 21:03


1 EA


 


 Acetaminophen/


 Hydrocodone Bitart


  (Norco 5/325 Tab)  1 tab  Q4H  PRN


 PO  3/7/17 10:30


 3/21/17 10:29  3/13/17 04:18


1 TAB


 


 Atenolol


  (Tenormin Tab)  50 mg  DAILY


 PO  3/10/17 09:00


 4/9/17 08:59  3/12/17 08:17


50 MG


 


 Hydrocortisone


  (Cortef Tab)  10 mg  QPM


 PO  3/9/17 21:00


 4/8/17 20:59  3/12/17 20:59


10 MG


 


 Hydrocortisone


  (Cortef Tab)  20 mg  QAM


 PO  3/10/17 09:00


 4/9/17 08:59  3/12/17 08:16


20 MG


 


 Zolpidem Tartrate


  (Ambien Tab)  10 mg  HS  PRN


 PO  3/9/17 17:00


 4/8/17 16:59  3/12/17 22:37


10 MG


 


 Hydralazine HCl


  (HydrALAZINE INJ)  10 mg  Q8  PRN


 IV.  3/9/17 19:15


 4/8/17 19:14  3/10/17 23:22


10 MG


 


 Potassium/


 Phosphorus/Sodium


  (Phospha 250


 Neutral


 155-852-130 Mg)  2 tab  QID


 PO  3/9/17 19:30


 4/8/17 19:29  3/12/17 20:59


2 TAB


 


 Lisinopril


  (Zestril Tab)  20 mg  QAM


 PO  3/10/17 09:00


 4/9/17 08:59  3/12/17 08:17


20 MG


 


 Acetaminophen


  (Tylenol Tab)  650 mg  Q4H  PRN


 PO  3/10/17 11:15


 4/9/17 11:14   


 


 


 Duloxetine HCl


 120 mg  120 mg  QAM


 PO  3/11/17 09:00


 4/10/17 08:59  3/12/17 08:16


120 MG


 


 Lorazepam/Syringe


  (Ativan Inj/


 Syringe)  1 ml @ 1


 mls/min  ONE  PRN


 IV  3/10/17 17:30


     


 


 


 Calcium Carbonate


  (Tums Chew Tab)  1,500 mg  HS


 PO  3/11/17 21:00


 4/10/17 20:59  3/12/17 21:00


1,500 MG


 


 Magnesium Oxide


  (Mag-Ox Tab)  800 mg  BID


 PO  3/11/17 21:00


 4/10/17 20:59  3/12/17 20:59


800 MG


 


 Calcium Carbonate


  (Tums Chew Tab)  500 mg  Q6H  PRN


 PO  3/12/17 19:00


 4/11/17 18:59  3/12/17 19:25


500 MG











Last 24 Hours








Test


  3/13/17


06:25


 


Sodium Level 142 mmol/L 


 


Potassium Level 4.0 mmol/L 


 


Chloride Level 108 mmol/L 


 


Carbon Dioxide Level 27 mmol/L 


 


Anion Gap 7.0 mmol/L 


 


Blood Urea Nitrogen 7 mg/dl 


 


Creatinine 1.00 mg/dl 


 


Est Creatinine Clear Calc


Drug Dose 122.6 ml/min 


 


 


Estimated GFR (


American) 97.1 


 


 


Estimated GFR (Non-


American 83.8 


 


 


BUN/Creatinine Ratio 7.1 


 


Random Glucose 79 mg/dl 


 


Calcium Level 7.8 mg/dl 


 


Ionized Calcium 1.06 mmol/l 


 


Phosphorus Level 2.6 mg/dl 


 


Magnesium Level 1.4 mg/dl 











Assessment & Plan


58 yo male with atn/volume depletion from overmedication of pain medications 

contributing to hypotension and hypocalcemia with hyperphosphatemia. pt 

continues to improve.





YI-non-oliguric-creatinine has improved back to baseline. resolved. pt aware 

of need to avoid nsaids





Hypocalcemia-on vitamin d daily and 1500mg of tums at night. may need to 

increase oral supplement to tid in between meals. yesterday had additional 2 

grams of calcium gluconate.  Ca improving but slow, still low.  will increase 

TUMS to 1500 mg tid between meals; cont daily iCa





Hypophos-phos levels are improving and today wnl.  on oral  phos 

supplementation. ck phos daily pls





hypomag-on oral mag and will give yet another dose of iv mag today. ck mag daily





ideally would see chemistries stable w/o IV repletion x 12-24 hrs prior to d/c.

  would repeat bmp, phos and mag about three days after discharge and continue 

oral supplements of oral mag and oral phos and oral calcium for now. 





Currently off hctz component of his bp medications; prone to edema anastacio R leg.  

however prefer stable electrolytes before restarting his home benazepril/hctz 

medications.

## 2017-03-13 NOTE — PROGRESS NOTE
Internal Med Progress Note


Date of Service:


Mar 13, 2017.


Provider Documentation:





SUBJECTIVE:








 offers no complain 


wants to go to inpatient alcohol rehab if accepted 


updated that he will need Lab work checked in few days after discharge for 

persisted low Mg level 





OBJECTIVE:





Vital Signs-as noted below





Exam:


General-no sign of distress , awake and alert 


Eyes-sclera non icteric 


Lungs-no rales or wheeze 


Heart-regular S1/S2


Abdomen-soft, non tender 


Extremities-rt knee mild tenderness, redness on rt 2nd toe with swelling and 

tenderness 


Neuro-AAo x3, no focal neurological deficit 





Lab data as noted below.


ASSESSMENT & PLAN:





CONFUSION /CHANGED MENTAL STATUS 


METABOLIC ENCEPHALOPATHY-


resolved 


presented with hypotension /altered mental status 


likely form toxic metabolic from Narcotic pain medications /Percocet use 


required Pressor support for marked hypotension -admitted to ICU 





- symptom resolved-mental status improved to baseline, alert communicating 


Acute renal failure /severe electrolyte derangement /hypotension corrected 


- CT head negative for acute change 





_d/w pt -can not recall how many Percocet he took


admits of drinking heavily 


-aware that it has been a problem , can not afford to go to Alcohol rehab -does 

not work /wife is the only person earning 


pt given  information /resources for Alcohol rehab 


willing to go to Alcohol rehab as out patient if insurance covers 








Chronic alcohol abuse/ NARCOTIC /SEDATIVE MEDICATION USE : 


hx of long term alcohol abuse ; 4-6 shots a day ? 


information obtained form Wife 


pt had 3 episodes of DUI , was [facility] [place] 


was on  House arrest for 6 months 


currently under probation 


pt continued to drink , wife mentions that he has a hidden stack


wife works most part of the day , unable to keep track of his drinking habits 





- pt started  on  alcohol withdrawal protocol with gabapentin 


- no evidence of withdrawal 


-pt understands that -his excessive drinking may lead to life threatening 

consequences 


-wants to have follow up as out pt , does not think he can afford for inpatient 

Alcohol rehab 


-information for potential rehab center given to pt by CM , referral faxed to 

[facility] [place], [facility], [place], [place], 

and [place].  .  





ELECTROLYTE DERANGEMENTS; 


low K .low mg, low phos , low Ca 


possible due to long term alcohol abuse 


replaced with IV replacement 


follow labs 


Nephrology following , appreciate input 


ordered PO Mg Oxide supplement 


Mg persistently low 1.4 today 


given IV supplement 


prior to discharge electrolytes should improved enough -that not requiring IV 

replacement for at least 24 hrs 


script given for Lab check : BMP , mag, Phos in 3 days after discharge 


need to follow up with Family physician 


need to have abstinence form alcohol 








HYPOVOLUMIC SHOCK 


-resolved, BP remains stable 


- likely Hypovolemic from Poor Oral Intake, Concurrent use of Diuretics


-Presented with YI -Cr 12


required IV pressors -Levophed briefly in ICU , weaned off 


YI resolved with IV hydration 


Cr 0.9 now 


IVF D/sheela 


given pt's hx of ETOH abuse , binge drinking , severe YI  on presentation -it 

may be prudent to have him off diuretics 


Atenolol resumed , 


hold off HCTZ for  persisted hypokalemia 





- Adrenal Insufficiency/PANHYPOPITUITARISM 


   on chronic PO Cortef, s/p Pituitary Resection


-started on IV  stress dose Hydrocortisone in ICU 


-appreciate input form Critical care 


-pt has remains stable hemodynamically 


 


on  Oral steroid regimen -PO Cortef 10 mg in AM /20 mg in PM 


cont on Levothyroxine 


testosterone replacement 








ACUTE RENAL FAILURE ON CKD 3


Cr was 12 on presentation 


- likely Pre Renal  baseline cr 1.5 


Cr improved to baseline 


avoid NSAID's ,contrast studies 


- Renal US: no obstruction





Lisinopril resumed 


cont to hold HCTZ for persisted hypokalemia 





- Nephro on board-appreciate input 











HX SEIZURES


- continue gabapentin 


-no seizure episode noted 





DEPRESSION /ANXIETY DISORDER : 


pt mentions of having ongoing depressive symptoms with hopelessness 


feels worse as he remains at home all the time 


lost his Job 


denies of any suicidal ideation 


does not have any hobby 


used to work with his guns -which are taken away since he is in probation 


starts drinking heavily every time he feels depressed 


Cymbalta resumed 


feels anxious /panic attack without his Diazepam 


Alcohol abuse 


high risk for abuse /over dose of BDZ at home -





Psychiatry consulted -appreciate input 


increased Cymbalta to 120 mg PO daily 


pt showing evidence of prescription controlled substance abuse -getting 

prescription for BDZ form different providers 





pt will not  be prescribed any controlled medications during hospital discharge 





hospital records /psychiatry evaluation faxed to pt's psychiatrist Dr Ambrocio 


pt should receive medications form one provide -preferably from his 

Psychiatrist to prevent abuse /misuse  





will notify pt's Family Physician -will benefit form  Contract for narcotics 

and BDZ 








HTN 


- BP stable 


_Atenolol 50 mg daily resumed 


-resumed ACEI 





DVT PROPHYLAXIS


- SQ Heparin 





DISPOSITION 





lives with wife at home





possible discharge home in 1 -2 days 


Medicine follow u p with Dr Dave on Monday 3/20/17 @ 1: 20 pm 


pt will need continued followup with His Psychiatrist as out pt for depression /

anxiety management 


will benefit form out pt Psychotherapy to address prescription medications 

dependency 





informations given/Referral made  for out pt Alcohol rehab


Vital Signs:











  Date Time  Temp Pulse Resp B/P Pulse Ox O2 Delivery O2 Flow Rate FiO2


 


3/14/17 14:49 37.2 74 18 159/95 96 Room Air  


 


3/14/17 08:18 36.9 76 20 148/84 93 Room Air  


 


3/14/17 08:06      Room Air  


 


3/14/17 03:01      Room Air  


 


3/14/17 00:01 37.0 76 16 148/88 93   


 


3/13/17 19:24  66  136/84    


 


3/13/17 19:15      Room Air  








Lab Results:





Results Past 24 Hours








Test


  3/14/17


07:44 Range/Units


 


 


Sodium Level 141 136-145  mmol/L


 


Potassium Level 4.0 3.5-5.1  mmol/L


 


Chloride Level 109   mmol/L


 


Carbon Dioxide Level 21 21-32  mmol/L


 


Anion Gap 11.0 3-11  mmol/L


 


Blood Urea Nitrogen 7 7-18  mg/dl


 


Creatinine


  0.89


  0.60-1.40


mg/dl


 


Est Creatinine Clear Calc


Drug Dose 135.5


   ml/min


 


 


Estimated GFR (


American) 110.8


   


 


 


Estimated GFR (Non-


American 95.6


   


 


 


BUN/Creatinine Ratio 8.1 10-20  


 


Random Glucose 76 70-99  mg/dl


 


Calcium Level 8.4 8.5-10.1  mg/dl


 


Ionized Calcium


  1.08


  1.12-1.32


mmol/l


 


Phosphorus Level 2.3 2.5-4.9  mg/dl


 


Magnesium Level 1.7 1.8-2.4  mg/dl

## 2017-03-14 VITALS
DIASTOLIC BLOOD PRESSURE: 95 MMHG | HEART RATE: 74 BPM | TEMPERATURE: 98.96 F | OXYGEN SATURATION: 96 % | SYSTOLIC BLOOD PRESSURE: 159 MMHG

## 2017-03-14 VITALS
HEART RATE: 76 BPM | DIASTOLIC BLOOD PRESSURE: 88 MMHG | OXYGEN SATURATION: 93 % | SYSTOLIC BLOOD PRESSURE: 148 MMHG | TEMPERATURE: 98.6 F

## 2017-03-14 VITALS
OXYGEN SATURATION: 93 % | TEMPERATURE: 98.42 F | SYSTOLIC BLOOD PRESSURE: 148 MMHG | DIASTOLIC BLOOD PRESSURE: 84 MMHG | HEART RATE: 76 BPM

## 2017-03-14 VITALS — SYSTOLIC BLOOD PRESSURE: 147 MMHG | DIASTOLIC BLOOD PRESSURE: 90 MMHG

## 2017-03-14 LAB
ANION GAP SERPL CALC-SCNC: 11 MMOL/L (ref 3–11)
BUN SERPL-MCNC: 7 MG/DL (ref 7–18)
BUN/CREAT SERPL: 8.1 (ref 10–20)
CALCIUM SERPL-MCNC: 8.4 MG/DL (ref 8.5–10.1)
CHLORIDE SERPL-SCNC: 109 MMOL/L (ref 98–107)
CO2 SERPL-SCNC: 21 MMOL/L (ref 21–32)
CREAT CL PREDICTED SERPL C-G-VRATE: 135.5 ML/MIN
CREAT SERPL-MCNC: 0.89 MG/DL (ref 0.6–1.4)
GLUCOSE SERPL-MCNC: 76 MG/DL (ref 70–99)
MAGNESIUM SERPL-MCNC: 1.7 MG/DL (ref 1.8–2.4)
PHOSPHATE SERPL-MCNC: 2.3 MG/DL (ref 2.5–4.9)
POTASSIUM SERPL-SCNC: 4 MMOL/L (ref 3.5–5.1)
SODIUM SERPL-SCNC: 141 MMOL/L (ref 136–145)

## 2017-03-14 RX ADMIN — HYDROCODONE BITARTRATE AND ACETAMINOPHEN PRN TAB: 5; 325 TABLET ORAL at 15:35

## 2017-03-14 RX ADMIN — LEVOTHYROXINE SODIUM SCH MCG: 150 TABLET ORAL at 05:23

## 2017-03-14 RX ADMIN — HYDROCORTISONE SCH MG: 10 TABLET ORAL at 07:58

## 2017-03-14 RX ADMIN — LORAZEPAM SCH MG: 1 TABLET ORAL at 19:52

## 2017-03-14 RX ADMIN — PANTOPRAZOLE SCH MG: 40 TABLET, DELAYED RELEASE ORAL at 07:56

## 2017-03-14 RX ADMIN — Medication SCH TAB: at 07:57

## 2017-03-14 RX ADMIN — MAGNESIUM SULFATE IN DEXTROSE SCH MLS/HR: 10 INJECTION, SOLUTION INTRAVENOUS at 10:32

## 2017-03-14 RX ADMIN — DOCUSATE SODIUM SCH MG: 100 CAPSULE, LIQUID FILLED ORAL at 19:55

## 2017-03-14 RX ADMIN — CALCIUM CARBONATE SCH MG: 500 TABLET ORAL at 07:59

## 2017-03-14 RX ADMIN — DIBASIC SODIUM PHOSPHATE, MONOBASIC POTASSIUM PHOSPHATE AND MONOBASIC SODIUM PHOSPHATE SCH TAB: 852; 155; 130 TABLET ORAL at 15:35

## 2017-03-14 RX ADMIN — Medication SCH MG: at 07:58

## 2017-03-14 RX ADMIN — Medication PRN MG: at 23:45

## 2017-03-14 RX ADMIN — MAGNESIUM SULFATE IN DEXTROSE SCH MLS/HR: 10 INJECTION, SOLUTION INTRAVENOUS at 11:44

## 2017-03-14 RX ADMIN — HYDROCODONE BITARTRATE AND ACETAMINOPHEN PRN TAB: 5; 325 TABLET ORAL at 05:23

## 2017-03-14 RX ADMIN — HYDROCODONE BITARTRATE AND ACETAMINOPHEN PRN TAB: 5; 325 TABLET ORAL at 19:58

## 2017-03-14 RX ADMIN — CALCIUM CARBONATE SCH MG: 500 TABLET ORAL at 13:58

## 2017-03-14 RX ADMIN — DIBASIC SODIUM PHOSPHATE, MONOBASIC POTASSIUM PHOSPHATE AND MONOBASIC SODIUM PHOSPHATE SCH TAB: 852; 155; 130 TABLET ORAL at 13:58

## 2017-03-14 RX ADMIN — Medication SCH MG: at 19:59

## 2017-03-14 RX ADMIN — ERGOCALCIFEROL SCH INTERUNIT: 1.25 CAPSULE, LIQUID FILLED ORAL at 07:56

## 2017-03-14 RX ADMIN — CALCIUM CARBONATE SCH MG: 500 TABLET ORAL at 19:54

## 2017-03-14 RX ADMIN — DIBASIC SODIUM PHOSPHATE, MONOBASIC POTASSIUM PHOSPHATE AND MONOBASIC SODIUM PHOSPHATE SCH TAB: 852; 155; 130 TABLET ORAL at 19:53

## 2017-03-14 RX ADMIN — DULOXETINE SCH MG: 60 CAPSULE, DELAYED RELEASE PELLETS ORAL at 07:57

## 2017-03-14 RX ADMIN — DIBASIC SODIUM PHOSPHATE, MONOBASIC POTASSIUM PHOSPHATE AND MONOBASIC SODIUM PHOSPHATE SCH TAB: 852; 155; 130 TABLET ORAL at 07:57

## 2017-03-14 RX ADMIN — LORAZEPAM SCH MG: 1 TABLET ORAL at 08:05

## 2017-03-14 RX ADMIN — HYDROCODONE BITARTRATE AND ACETAMINOPHEN PRN TAB: 5; 325 TABLET ORAL at 10:31

## 2017-03-14 RX ADMIN — HYDROCORTISONE SCH MG: 10 TABLET ORAL at 19:53

## 2017-03-14 RX ADMIN — NICOTINE SCH PATCH: 21 PATCH, EXTENDED RELEASE TRANSDERMAL at 07:57

## 2017-03-14 RX ADMIN — DOCUSATE SODIUM SCH MG: 100 CAPSULE, LIQUID FILLED ORAL at 07:58

## 2017-03-14 RX ADMIN — LISINOPRIL SCH MG: 20 TABLET ORAL at 07:57

## 2017-03-14 NOTE — NEPHROLOGY PROGRESS NOTE
Nephrology Progress Note


Date of Service:


Mar 14, 2017.


Subjective


c/o HA and abd malaise/N; >5 BM all diarrhea per pt (zero bm on chart).  tired.

  no sob or chest pain





Objective











  Date Time  Temp Pulse Resp B/P Pulse Ox O2 Delivery O2 Flow Rate FiO2


 


3/14/17 08:18 36.9 76 20 148/84 93 Room Air  


 


3/14/17 03:01      Room Air  


 


3/14/17 00:01 37.0 76 16 148/88 93   


 


3/13/17 19:24  66  136/84    


 


3/13/17 19:15      Room Air  


 


3/13/17 16:00      Room Air  


 


3/13/17 15:02 37.0 71 16 139/79 93 Room Air  








Physical Exam:


General-aaox3, obese, on RA, maneuvers readily for exam


Eyes-no scleral icterus


ENT-mmm


Neck-supple


Lungs-clear but diminished


Heart-rrr


Abdomen-bs+ s/nt/nd 


Extremities-no edema


Neuro-nonfocal





 Current Inpatient Medications








 Medications


  (Trade)  Dose


 Ordered  Sig/Joce


 Route  Start Time


 Stop Time Status Last Admin


Dose Admin


 


 Heparin Sodium


  (Porcine)


  (Heparin Sq 5000


 Unit/0.5ml)  5,000 unit  Q12H


 SQ  3/5/17 21:00


 4/4/17 20:59 Future Hold 3/9/17 07:50


5,000 UNIT


 


 Testosterone


 Cypionate


  (Depo-Testosterone


 Inj)  100 mg  We@0900


 IM  3/8/17 09:00


 4/7/17 08:59  3/8/17 08:34


100 MG


 


 Ergocalciferol


  (Vitamin D Cap)  50,000


 interunit  DAILY@0900


 PO  3/5/17 17:00


 4/4/17 16:59  3/14/17 07:56


50,000 INTERUNIT


 


 Levothyroxine


 Sodium


  (Synthroid Tab)  300 mcg  DAILYBB


 PO  3/6/17 10:00


 4/5/17 09:59  3/14/17 05:23


300 MCG


 


 Docusate Sodium


  (coLACE CAP)  100 mg  BID


 PO  3/6/17 21:00


 4/5/17 20:59  3/14/17 07:58


100 MG


 


 Pantoprazole


 Sodium


  (Protonix Tab)  40 mg  DAILY


 PO  3/7/17 09:00


 4/6/17 08:59  3/14/17 07:56


40 MG


 


 Lorazepam


  (Ativan Tab)  1 mg  BID


 PO  3/6/17 21:00


 4/5/17 20:59  3/14/17 08:05


1 MG


 


 Morphine Sulfate


  (MoRPHine


 SULFATE INJ)  1 mg  Q3H  PRN


 IV  3/6/17 11:00


 3/20/17 10:59  3/7/17 04:20


1 MG


 


 Multivitamins


  (Multivitamin


 Tab)  1 tab  QAM


 PO  3/7/17 09:00


 4/6/17 08:59  3/14/17 07:57


1 TAB


 


 Thiamine HCl


  (Vitamin B-1 Tab)  100 mg  QAM


 PO  3/7/17 09:00


 4/6/17 08:59  3/14/17 07:58


100 MG


 


 Folic Acid


  (Folvite Tab)  1 mg  QAM


 PO  3/7/17 09:00


 4/6/17 08:59  3/14/17 07:59


1 MG


 


 Nicotine


  (Nicoderm Cq


 21MG Patch)  1 patch  QAM


 TD  3/7/17 09:00


 4/6/17 08:59  3/14/17 07:57


1 PATCH


 


 Miscellaneous


  (Remove Nicoderm


 Patch)  1 ea  HS


 N/A  3/6/17 21:00


 4/5/17 20:59  3/13/17 21:08


1 EA


 


 Acetaminophen/


 Hydrocodone Bitart


  (Norco 5/325 Tab)  1 tab  Q4H  PRN


 PO  3/7/17 10:30


 3/21/17 10:29  3/14/17 05:23


1 TAB


 


 Atenolol


  (Tenormin Tab)  50 mg  DAILY


 PO  3/10/17 09:00


 4/9/17 08:59  3/14/17 07:57


50 MG


 


 Hydrocortisone


  (Cortef Tab)  10 mg  QPM


 PO  3/9/17 21:00


 4/8/17 20:59  3/13/17 21:06


10 MG


 


 Hydrocortisone


  (Cortef Tab)  20 mg  QAM


 PO  3/10/17 09:00


 4/9/17 08:59  3/14/17 07:58


20 MG


 


 Zolpidem Tartrate


  (Ambien Tab)  10 mg  HS  PRN


 PO  3/9/17 17:00


 4/8/17 16:59  3/13/17 21:05


10 MG


 


 Hydralazine HCl


  (HydrALAZINE INJ)  10 mg  Q8  PRN


 IV.  3/9/17 19:15


 4/8/17 19:14  3/10/17 23:22


10 MG


 


 Potassium/


 Phosphorus/Sodium


  (Phospha 250


 Neutral


 155-852-130 Mg)  2 tab  QID


 PO  3/9/17 19:30


 4/8/17 19:29  3/14/17 07:57


2 TAB


 


 Lisinopril


  (Zestril Tab)  20 mg  QAM


 PO  3/10/17 09:00


 4/9/17 08:59  3/14/17 07:57


20 MG


 


 Acetaminophen


  (Tylenol Tab)  650 mg  Q4H  PRN


 PO  3/10/17 11:15


 4/9/17 11:14   


 


 


 Duloxetine HCl


 120 mg  120 mg  QAM


 PO  3/11/17 09:00


 4/10/17 08:59  3/14/17 07:57


120 MG


 


 Lorazepam/Syringe


  (Ativan Inj/


 Syringe)  1 ml @ 1


 mls/min  ONE  PRN


 IV  3/10/17 17:30


     


 


 


 Magnesium Oxide


  (Mag-Ox Tab)  800 mg  BID


 PO  3/11/17 21:00


 4/10/17 20:59  3/14/17 07:58


800 MG


 


 Calcium Carbonate


  (Tums Chew Tab)  500 mg  Q6H  PRN


 PO  3/12/17 19:00


 4/11/17 18:59  3/13/17 19:20


500 MG


 


 Calcium Carbonate


  (Tums Chew Tab)  1,500 mg  TID


 PO  3/13/17 09:00


 4/12/17 08:59  3/14/17 07:59


1,500 MG


 


 Somatropin


  (Humatrope)  0.3 mg  DAILY


 SC  3/14/17 09:00


 4/13/17 08:59  3/13/17 14:22


0.3 MG











Last 24 Hours








Test


  3/14/17


07:44


 


Sodium Level 141 mmol/L 


 


Potassium Level 4.0 mmol/L 


 


Chloride Level 109 mmol/L 


 


Carbon Dioxide Level 21 mmol/L 


 


Anion Gap 11.0 mmol/L 


 


Blood Urea Nitrogen 7 mg/dl 


 


Creatinine 0.89 mg/dl 


 


Est Creatinine Clear Calc


Drug Dose 135.5 ml/min 


 


 


Estimated GFR (


American) 110.8 


 


 


Estimated GFR (Non-


American 95.6 


 


 


BUN/Creatinine Ratio 8.1 


 


Random Glucose 76 mg/dl 


 


Calcium Level 8.4 mg/dl 


 


Ionized Calcium 1.08 mmol/l 


 


Phosphorus Level 2.3 mg/dl 


 


Magnesium Level 1.7 mg/dl 











Assessment & Plan


58 yo male with atn/volume depletion from overmedication of pain medications 

contributing to hypotension and hypocalcemia with hyperphosphatemia. pt 

continues to improve.  other significant medical hx includes panhypopituitarism 

and substance abuse (pain meds and EtOH); pt w/ 3 DUI and incarceration d/t 

substances.





YI-non-oliguric-creatinine has improved back to baseline. resolved. pt aware 

of need to avoid nsaids





Hypocalcemia-D2 changed to weekly and on 3/13 increased 1500mg of tums to tid.  

Ca improving but slow, still low. cont daily iCa but no extra supplements today





Hypophos-phos levels are acceptable.  on oral  phos supplementation. ck phos 

daily pls





hypomag-on oral mag but will change to more absorbable form.  2 gm IV mag 

today.  cont daily mag ck 





ideally would see chemistries stable w/o IV repletion x 24 hrs prior to d/c.  

electrolyte disorders complicated by adrenal / pituitary insufficiency.  would 

repeat bmp, phos and mag about three days after discharge and continue oral 

supplements of oral mag and oral phos and oral calcium for now. 





Currently off hctz component of his bp medications; prone to edema anasatcio R leg.  

however prefer stable electrolytes before restarting his home benazepril/hctz 

medications.





Appreciate consult; will follow with you.  Care coordinated w/ Dr Levin.

## 2017-03-14 NOTE — PROGRESS NOTE
Internal Med Progress Note


Date of Service:


Mar 14, 2017.


Provider Documentation:





SUBJECTIVE:





resting comfortably


having several episodes of diarrhea


poor appetite today


no vomiting


no abdominal pain


afebrile











OBJECTIVE:





Vital Signs-as noted below





Exam:


General-alert and oriented. Not in distress


ENT-normal hearing


Neck-no neck masses


Lungs-cta b/l no wheezing no crackles


Heart-s1 and s2 heard, regular rate and rhythm no murmurs


Abdomen-soft bowel sounds present non tender no distension


Extremities-no edema no erythema 


Neuro-alert and awake


moves extremities








Lab data as noted below.


ASSESSMENT & PLAN:


CONFUSION /CHANGED MENTAL STATUS 


METABOLIC ENCEPHALOPATHY-


presented with alerted mental status , hypotension and cr of 12.


required pressor support in ICU


Most likely form toxic metabolic from Narcotic pain medications /Percocet use 


also hx of alcoholism


currently mental status at baseline and hemodynamically stable


willing to go to Alcohol rehab as out patient if insurance covers 








Chronic alcohol abuse/ NARCOTIC /SEDATIVE MEDICATION USE : 


hx of long term alcohol abuse ; 4-6 shots a day ? 


information seems to be obtained form Wife 


pt had 3 episodes of DUI , was incarcerated for 10 days in shelter 


was on  House arrest for 6 months 


currently under probation 


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


information for potential rehab center given to pt by CM , referral faxed to 

[facility] at [place], [facility], [place], [place], 

and [place].  .  





ELECTROLYTE DERANGEMENTS; 


has diarrhea since last several days


magnesium replacement as per nephrology


will f/u labs








HYPOVOLUMIC SHOCK 


on presentation


Most likely Hypovolemic from Poor Oral Intake, Concurrent use of Diuretics


Presented with YI -Cr 12


required pressors initially


hold off HCTZ for  persisted hypokalemia 


stable now





- Adrenal Insufficiency/PANHYPOPITUITARISM 


   on chronic PO Cortef, s/p Pituitary Resection


Was started on IV  stress dose Hydrocortisone in ICU 


appreciate input form Critical care 


Currently  on  Oral steroid regimen -PO Cortef 10 mg in AM /20 mg in PM 


cont on Levothyroxine 


testosterone replacement 








ACUTE RENAL FAILURE ON CKD 3


Cr was 12 on presentation 


 likely Pre Renal  baseline cr 1.5 


resolved.Lisinopril resumed 


cont to hold HCTZ for persisted hypokalemia 


Nephro on board and appreciate input 











HX SEIZURES


On  gabapentin 


no seizure episode noted 





DEPRESSION /ANXIETY DISORDER : 


starts drinking heavily every time he feels depressed 


Cymbalta resumed 


feels anxious /panic attack without his Diazepam 


hx of ongoing Alcoholism 


high risk for abuse /over dose of BDZ at home -


Psychiatry consulted appreciate input 


increased Cymbalta to 120 mg PO daily 


pt seems to be getting prescription for BDZ form different providers 


pt will not  be prescribed any controlled medications during hospital discharge 


hospital records /psychiatry evaluation faxed to pt's psychiatrist Dr Ambrocio 











HTN 


on atenolol and lisinopril


hctz on hold


will monitor





DVT PROPHYLAXIS


 SQ Heparin 





DISPOSITION 





lives with wife at home


to be determined





Vital Signs:











  Date Time  Temp Pulse Resp B/P Pulse Ox O2 Delivery O2 Flow Rate FiO2


 


3/14/17 14:49 37.2 74 18 159/95 96 Room Air  


 


3/14/17 08:18 36.9 76 20 148/84 93 Room Air  


 


3/14/17 08:06      Room Air  


 


3/14/17 03:01      Room Air  


 


3/14/17 00:01 37.0 76 16 148/88 93   


 


3/13/17 19:24  66  136/84    


 


3/13/17 19:15      Room Air  








Lab Results:





Results Past 24 Hours








Test


  3/14/17


07:44 Range/Units


 


 


Sodium Level 141 136-145  mmol/L


 


Potassium Level 4.0 3.5-5.1  mmol/L


 


Chloride Level 109   mmol/L


 


Carbon Dioxide Level 21 21-32  mmol/L


 


Anion Gap 11.0 3-11  mmol/L


 


Blood Urea Nitrogen 7 7-18  mg/dl


 


Creatinine


  0.89


  0.60-1.40


mg/dl


 


Est Creatinine Clear Calc


Drug Dose 135.5


   ml/min


 


 


Estimated GFR (


American) 110.8


   


 


 


Estimated GFR (Non-


American 95.6


   


 


 


BUN/Creatinine Ratio 8.1 10-20  


 


Random Glucose 76 70-99  mg/dl


 


Calcium Level 8.4 8.5-10.1  mg/dl


 


Ionized Calcium


  1.08


  1.12-1.32


mmol/l


 


Phosphorus Level 2.3 2.5-4.9  mg/dl


 


Magnesium Level 1.7 1.8-2.4  mg/dl

## 2017-03-15 VITALS
TEMPERATURE: 98.6 F | SYSTOLIC BLOOD PRESSURE: 128 MMHG | OXYGEN SATURATION: 95 % | DIASTOLIC BLOOD PRESSURE: 81 MMHG | HEART RATE: 69 BPM

## 2017-03-15 VITALS
OXYGEN SATURATION: 92 % | DIASTOLIC BLOOD PRESSURE: 94 MMHG | TEMPERATURE: 98.6 F | HEART RATE: 78 BPM | SYSTOLIC BLOOD PRESSURE: 147 MMHG

## 2017-03-15 VITALS
SYSTOLIC BLOOD PRESSURE: 132 MMHG | OXYGEN SATURATION: 93 % | HEART RATE: 74 BPM | DIASTOLIC BLOOD PRESSURE: 86 MMHG | TEMPERATURE: 98.42 F

## 2017-03-15 VITALS — OXYGEN SATURATION: 93 %

## 2017-03-15 LAB
ANION GAP SERPL CALC-SCNC: 9 MMOL/L (ref 3–11)
BUN SERPL-MCNC: 8 MG/DL (ref 7–18)
BUN/CREAT SERPL: 8 (ref 10–20)
CALCIUM SERPL-MCNC: 8 MG/DL (ref 8.5–10.1)
CHLORIDE SERPL-SCNC: 107 MMOL/L (ref 98–107)
CO2 SERPL-SCNC: 23 MMOL/L (ref 21–32)
CREAT CL PREDICTED SERPL C-G-VRATE: 120.6 ML/MIN
CREAT SERPL-MCNC: 1 MG/DL (ref 0.6–1.4)
GLUCOSE SERPL-MCNC: 72 MG/DL (ref 70–99)
MAGNESIUM SERPL-MCNC: 1.8 MG/DL (ref 1.8–2.4)
POTASSIUM SERPL-SCNC: 4.1 MMOL/L (ref 3.5–5.1)
SODIUM SERPL-SCNC: 139 MMOL/L (ref 136–145)

## 2017-03-15 RX ADMIN — CALCIUM CARBONATE SCH MG: 500 TABLET ORAL at 20:45

## 2017-03-15 RX ADMIN — Medication SCH MG: at 08:54

## 2017-03-15 RX ADMIN — LORAZEPAM SCH MG: 1 TABLET ORAL at 20:26

## 2017-03-15 RX ADMIN — LEVOTHYROXINE SODIUM SCH MCG: 150 TABLET ORAL at 04:52

## 2017-03-15 RX ADMIN — HYDROCODONE BITARTRATE AND ACETAMINOPHEN PRN TAB: 5; 325 TABLET ORAL at 13:04

## 2017-03-15 RX ADMIN — CALCIUM CARBONATE SCH MG: 500 TABLET ORAL at 13:04

## 2017-03-15 RX ADMIN — Medication PRN MG: at 22:27

## 2017-03-15 RX ADMIN — DIBASIC SODIUM PHOSPHATE, MONOBASIC POTASSIUM PHOSPHATE AND MONOBASIC SODIUM PHOSPHATE SCH TAB: 852; 155; 130 TABLET ORAL at 13:04

## 2017-03-15 RX ADMIN — DIBASIC SODIUM PHOSPHATE, MONOBASIC POTASSIUM PHOSPHATE AND MONOBASIC SODIUM PHOSPHATE SCH TAB: 852; 155; 130 TABLET ORAL at 08:53

## 2017-03-15 RX ADMIN — HYDROCODONE BITARTRATE AND ACETAMINOPHEN PRN TAB: 5; 325 TABLET ORAL at 04:53

## 2017-03-15 RX ADMIN — LISINOPRIL SCH MG: 20 TABLET ORAL at 08:54

## 2017-03-15 RX ADMIN — DULOXETINE SCH MG: 60 CAPSULE, DELAYED RELEASE PELLETS ORAL at 08:53

## 2017-03-15 RX ADMIN — Medication SCH TAB: at 08:53

## 2017-03-15 RX ADMIN — LORAZEPAM SCH MG: 1 TABLET ORAL at 09:00

## 2017-03-15 RX ADMIN — ERGOCALCIFEROL SCH INTERUNIT: 1.25 CAPSULE, LIQUID FILLED ORAL at 08:54

## 2017-03-15 RX ADMIN — TESTOSTERONE CYPIONATE SCH MG: 200 INJECTION, SOLUTION INTRAMUSCULAR at 09:59

## 2017-03-15 RX ADMIN — PANTOPRAZOLE SCH MG: 40 TABLET, DELAYED RELEASE ORAL at 08:53

## 2017-03-15 RX ADMIN — DOCUSATE SODIUM SCH MG: 100 CAPSULE, LIQUID FILLED ORAL at 20:28

## 2017-03-15 RX ADMIN — CALCIUM CARBONATE SCH MG: 500 TABLET ORAL at 08:54

## 2017-03-15 RX ADMIN — DIBASIC SODIUM PHOSPHATE, MONOBASIC POTASSIUM PHOSPHATE AND MONOBASIC SODIUM PHOSPHATE SCH TAB: 852; 155; 130 TABLET ORAL at 20:30

## 2017-03-15 RX ADMIN — HYDROCODONE BITARTRATE AND ACETAMINOPHEN PRN TAB: 5; 325 TABLET ORAL at 18:19

## 2017-03-15 RX ADMIN — DIBASIC SODIUM PHOSPHATE, MONOBASIC POTASSIUM PHOSPHATE AND MONOBASIC SODIUM PHOSPHATE SCH TAB: 852; 155; 130 TABLET ORAL at 16:08

## 2017-03-15 RX ADMIN — Medication SCH MG: at 08:53

## 2017-03-15 RX ADMIN — HYDROCORTISONE SCH MG: 10 TABLET ORAL at 08:53

## 2017-03-15 RX ADMIN — Medication SCH MG: at 20:45

## 2017-03-15 RX ADMIN — DOCUSATE SODIUM SCH MG: 100 CAPSULE, LIQUID FILLED ORAL at 10:29

## 2017-03-15 RX ADMIN — HYDROCORTISONE SCH MG: 10 TABLET ORAL at 20:29

## 2017-03-15 RX ADMIN — HYDROCODONE BITARTRATE AND ACETAMINOPHEN PRN TAB: 5; 325 TABLET ORAL at 09:00

## 2017-03-15 RX ADMIN — NICOTINE SCH PATCH: 21 PATCH, EXTENDED RELEASE TRANSDERMAL at 08:54

## 2017-03-15 RX ADMIN — HYDROCODONE BITARTRATE AND ACETAMINOPHEN PRN TAB: 5; 325 TABLET ORAL at 22:28

## 2017-03-15 NOTE — NEPHROLOGY PROGRESS NOTE
Nephrology Progress Note


Date of Service:


Mar 15, 2017.


Subjective


c/o HA on going but improved abd malaise/N; >5 BM all diarrhea per pt (

unchanged from yesterday). no edema. no sob or chest pain





Objective











  Date Time  Temp Pulse Resp B/P Pulse Ox O2 Delivery O2 Flow Rate FiO2


 


3/15/17 08:19     93 Room Air  


 


3/15/17 08:12 36.9 74 18 132/86 93 Room Air  


 


3/15/17 01:00      Room Air  


 


3/15/17 00:00 37.0 78 20 147/94 92 Room Air  


 


3/14/17 18:37    147/90    


 


3/14/17 16:00      Room Air  


 


3/14/17 14:49 37.2 74 18 159/95 96 Room Air  








Physical Exam:


General-aaox3, obese, on RA, maneuvers readily for exam


Eyes-no scleral icterus


ENT-mmm


Neck-supple


Lungs-clear yet diminished


Heart-rrr


Abdomen-bs+ s/nt/nd 


Extremities-no edema


Neuro-nonfocal





 Current Inpatient Medications








 Medications


  (Trade)  Dose


 Ordered  Sig/Joce


 Route  Start Time


 Stop Time Status Last Admin


Dose Admin


 


 Heparin Sodium


  (Porcine)


  (Heparin Sq 5000


 Unit/0.5ml)  5,000 unit  Q12H


 SQ  3/5/17 21:00


 4/4/17 20:59 Future Hold 3/9/17 07:50


5,000 UNIT


 


 Testosterone


 Cypionate


  (Depo-Testosterone


 Inj)  100 mg  We@0900


 IM  3/8/17 09:00


 4/7/17 08:59  3/8/17 08:34


100 MG


 


 Ergocalciferol


  (Vitamin D Cap)  50,000


 interunit  DAILY@0900


 PO  3/5/17 17:00


 4/4/17 16:59  3/15/17 08:54


50,000 INTERUNIT


 


 Levothyroxine


 Sodium


  (Synthroid Tab)  300 mcg  DAILYBB


 PO  3/6/17 10:00


 4/5/17 09:59  3/15/17 04:52


300 MCG


 


 Docusate Sodium


  (coLACE CAP)  100 mg  BID


 PO  3/6/17 21:00


 4/5/17 20:59  3/14/17 19:55


100 MG


 


 Pantoprazole


 Sodium


  (Protonix Tab)  40 mg  DAILY


 PO  3/7/17 09:00


 4/6/17 08:59  3/15/17 08:53


40 MG


 


 Lorazepam


  (Ativan Tab)  1 mg  BID


 PO  3/6/17 21:00


 4/5/17 20:59  3/15/17 09:00


1 MG


 


 Morphine Sulfate


  (MoRPHine


 SULFATE INJ)  1 mg  Q3H  PRN


 IV  3/6/17 11:00


 3/20/17 10:59  3/7/17 04:20


1 MG


 


 Multivitamins


  (Multivitamin


 Tab)  1 tab  QAM


 PO  3/7/17 09:00


 4/6/17 08:59  3/15/17 08:53


1 TAB


 


 Thiamine HCl


  (Vitamin B-1 Tab)  100 mg  QAM


 PO  3/7/17 09:00


 4/6/17 08:59  3/15/17 08:54


100 MG


 


 Folic Acid


  (Folvite Tab)  1 mg  QAM


 PO  3/7/17 09:00


 4/6/17 08:59  3/15/17 08:53


1 MG


 


 Nicotine


  (Nicoderm Cq


 21MG Patch)  1 patch  QAM


 TD  3/7/17 09:00


 4/6/17 08:59  3/15/17 08:54


1 PATCH


 


 Miscellaneous


  (Remove Nicoderm


 Patch)  1 ea  HS


 N/A  3/6/17 21:00


 4/5/17 20:59  3/14/17 21:03


1 EA


 


 Acetaminophen/


 Hydrocodone Bitart


  (Norco 5/325 Tab)  1 tab  Q4H  PRN


 PO  3/7/17 10:30


 3/21/17 10:29  3/15/17 09:00


1 TAB


 


 Atenolol


  (Tenormin Tab)  50 mg  DAILY


 PO  3/10/17 09:00


 4/9/17 08:59  3/15/17 08:54


50 MG


 


 Hydrocortisone


  (Cortef Tab)  10 mg  QPM


 PO  3/9/17 21:00


 4/8/17 20:59  3/14/17 19:53


10 MG


 


 Hydrocortisone


  (Cortef Tab)  20 mg  QAM


 PO  3/10/17 09:00


 4/9/17 08:59  3/15/17 08:53


20 MG


 


 Zolpidem Tartrate


  (Ambien Tab)  10 mg  HS  PRN


 PO  3/9/17 17:00


 4/8/17 16:59  3/14/17 23:45


10 MG


 


 Hydralazine HCl


  (HydrALAZINE INJ)  10 mg  Q8  PRN


 IV.  3/9/17 19:15


 4/8/17 19:14  3/10/17 23:22


10 MG


 


 Potassium/


 Phosphorus/Sodium


  (Phospha 250


 Neutral


 155-852-130 Mg)  2 tab  QID


 PO  3/9/17 19:30


 4/8/17 19:29  3/15/17 08:53


2 TAB


 


 Lisinopril


  (Zestril Tab)  20 mg  QAM


 PO  3/10/17 09:00


 4/9/17 08:59  3/15/17 08:54


20 MG


 


 Acetaminophen


  (Tylenol Tab)  650 mg  Q4H  PRN


 PO  3/10/17 11:15


 4/9/17 11:14  3/14/17 18:28


650 MG


 


 Duloxetine HCl


 120 mg  120 mg  QAM


 PO  3/11/17 09:00


 4/10/17 08:59  3/15/17 08:53


120 MG


 


 Lorazepam/Syringe


  (Ativan Inj/


 Syringe)  1 ml @ 1


 mls/min  ONE  PRN


 IV  3/10/17 17:30


     


 


 


 Calcium Carbonate


  (Tums Chew Tab)  500 mg  Q6H  PRN


 PO  3/12/17 19:00


 4/11/17 18:59  3/13/17 19:20


500 MG


 


 Calcium Carbonate


  (Tums Chew Tab)  1,500 mg  TID


 PO  3/13/17 09:00


 4/12/17 08:59  3/15/17 08:54


1,500 MG


 


 Somatropin


  (Humatrope)  0.3 mg  DAILY


 SC  3/14/17 09:00


 4/13/17 08:59  3/13/17 14:22


0.3 MG


 


 Magnesium Chloride


  (Slow-Mag Tab)  128 mg  BID


 PO  3/14/17 21:00


 4/13/17 20:59  3/15/17 08:53


128 MG











Last 24 Hours








Test


  3/15/17


05:35


 


Sodium Level 139 mmol/L 


 


Potassium Level 4.1 mmol/L 


 


Chloride Level 107 mmol/L 


 


Carbon Dioxide Level 23 mmol/L 


 


Anion Gap 9.0 mmol/L 


 


Blood Urea Nitrogen 8 mg/dl 


 


Creatinine 1.00 mg/dl 


 


Est Creatinine Clear Calc


Drug Dose 120.6 ml/min 


 


 


Estimated GFR (


American) 97.1 


 


 


Estimated GFR (Non-


American 83.8 


 


 


BUN/Creatinine Ratio 8.0 


 


Random Glucose 72 mg/dl 


 


Calcium Level 8.0 mg/dl 


 


Ionized Calcium 1.08 mmol/l 


 


Magnesium Level 1.8 mg/dl 











Assessment & Plan


58 yo male with atn/volume depletion from overmedication of pain medications 

contributing to hypotension and hypocalcemia with hyperphosphatemia. pt 

continues to improve.  other significant medical hx includes panhypopituitarism 

and substance abuse (pain meds and EtOH); pt w/ 3 DUI and incarceration d/t 

substances.





YI-non-oliguric-creatinine has improved back to baseline. resolved. pt aware 

of need to avoid nsaids





Hypocalcemia-D2 changed to weekly and on 3/13 increased 1500mg of tums to tid.  

Ca improving but slow, still slightly low. cont daily iCa but no extra 

supplements today





Hypophos-phos levels are acceptable.  on oral  phos supplementation. ck phos 

daily pls





hypomag-on oral mag but will change to more absorbable form. no IV mag today; 

will lower po mag to 64 mg bid to seeif better absorbed.  cont daily mag ck 





ideally would see chemistries stable w/o IV repletion x 24 hrs prior to d/c.  

electrolyte disorders complicated by adrenal / pituitary insufficiency.  would 

repeat bmp, phos and mag about three days after discharge and continue oral 

supplements of oral mag and oral phos and oral calcium for now. 





Currently off hctz component of his bp medications; prone to edema anastacio R leg.  

however prefer stable electrolytes before restarting his home benazepril/hctz 

medications> bp acceptable currently on ACEI.





Appreciate consult; will follow with you.

## 2017-03-15 NOTE — PROGRESS NOTE
Internal Med Progress Note


Date of Service:


Mar 15, 2017.


Provider Documentation:





SUBJECTIVE:





says his headaches improved with imitrex


still has diarrhea but getting better'


eating better today


no nausea or abdominal pain


no sob











OBJECTIVE:





Vital Signs-as noted below





Exam:


General-alert and oriented. Not in distress


ENT-normal hearing


Neck-no neck masses


Lungs-cta b/l no wheezing no crackles


Heart-s1 and s2 heard, regular rate and rhythm no murmurs


Abdomen-soft bowel sounds present non tender no distension


Extremities-no edema no erythema 


Neuro-alert and awake


moves extremities








Lab data as noted below.


ASSESSMENT & PLAN:


CONFUSION /CHANGED MENTAL STATUS 


METABOLIC ENCEPHALOPATHY-


presented with alerted mental status , hypotension and cr of 12.


required pressor support in ICU


Most likely form toxic metabolic from Narcotic pain medications /Percocet use 


also hx of alcoholism


currently mental status at baseline and hemodynamically stable


willing to go to Alcohol rehab as out patient if insurance covers


stable currently 








Chronic alcohol abuse/ NARCOTIC /SEDATIVE MEDICATION USE : 


hx of long term alcohol abuse ; 4-6 shots a day ? 


information seems to be obtained form Wife 


pt had 3 episodes of DUI , was [facility] in [facility] 


was on  House arrest for 6 months 


currently under probation 


patient dose not want to go back to intermediate , wants to go back home


information for potential rehab center given to pt by CM , referral faxed to 

[facility] [place], [facility], [place], [place], 

and [place].  .


stable 





ELECTROLYTE DERANGEMENTS; 


has diarrhea since last several days


magnesium replacement as per nephrology


improving


will f/u labs








HYPOVOLUMIC SHOCK 


on presentation


Most likely Hypovolemic from Poor Oral Intake, Concurrent use of Diuretics


Presented with YI -Cr 12


required pressors initially


hold off HCTZ for  persisted hypokalemia 


stable now





- Adrenal Insufficiency/PANHYPOPITUITARISM 


   on chronic PO Cortef, s/p Pituitary Resection


Was started on IV  stress dose Hydrocortisone in ICU 


appreciate input form Critical care 


Currently  on  Oral steroid regimen -PO Cortef 10 mg in AM /20 mg in PM 


cont on Levothyroxine 


testosterone replacement 


followup with pcp/endo 








ACUTE RENAL FAILURE ON CKD 3


Cr was 12 on presentation 


 likely Pre Renal  baseline cr 1.5 


resolved.Lisinopril resumed 


cont to hold HCTZ for persisted hypokalemia 


Nephro on board and appreciate input 











HX SEIZURES


On  gabapentin 


no seizure episode noted 





DEPRESSION /ANXIETY DISORDER : 


starts drinking heavily every time he feels depressed 


Cymbalta resumed 


feels anxious /panic attack without his Diazepam 


hx of ongoing Alcoholism 


high risk for abuse /over dose of BDZ at home -


Psychiatry consulted appreciate input 


increased Cymbalta to 120 mg PO daily 


pt seems to be getting prescription for BDZ form different providers 


pt will not  be prescribed any controlled medications during hospital discharge 


hospital records /psychiatry evaluation faxed to pt's psychiatrist Dr Ambrocio 


seems stable











HTN 


on atenolol and lisinopril


hctz on hold


will monitor





DVT PROPHYLAXIS


 SQ Heparin 





DISPOSITION 





lives with wife at home


possible d/c in 1-2 days





Vital Signs:











  Date Time  Temp Pulse Resp B/P Pulse Ox O2 Delivery O2 Flow Rate FiO2


 


3/15/17 08:19     93 Room Air  


 


3/15/17 08:12 36.9 74 18 132/86 93 Room Air  


 


3/15/17 08:00      Room Air  


 


3/15/17 01:00      Room Air  


 


3/15/17 00:00 37.0 78 20 147/94 92 Room Air  


 


3/14/17 18:37    147/90    


 


3/14/17 16:00      Room Air  


 


3/14/17 14:49 37.2 74 18 159/95 96 Room Air  








Lab Results:





Results Past 24 Hours








Test


  3/15/17


05:35 Range/Units


 


 


Sodium Level 139 136-145  mmol/L


 


Potassium Level 4.1 3.5-5.1  mmol/L


 


Chloride Level 107   mmol/L


 


Carbon Dioxide Level 23 21-32  mmol/L


 


Anion Gap 9.0 3-11  mmol/L


 


Blood Urea Nitrogen 8 7-18  mg/dl


 


Creatinine


  1.00


  0.60-1.40


mg/dl


 


Est Creatinine Clear Calc


Drug Dose 120.6


   ml/min


 


 


Estimated GFR (


American) 97.1


   


 


 


Estimated GFR (Non-


American 83.8


   


 


 


BUN/Creatinine Ratio 8.0 10-20  


 


Random Glucose 72 70-99  mg/dl


 


Calcium Level 8.0 8.5-10.1  mg/dl


 


Ionized Calcium


  1.08


  1.12-1.32


mmol/l


 


Magnesium Level 1.8 1.8-2.4  mg/dl

## 2017-03-16 VITALS
HEART RATE: 79 BPM | SYSTOLIC BLOOD PRESSURE: 121 MMHG | DIASTOLIC BLOOD PRESSURE: 79 MMHG | TEMPERATURE: 98.42 F | OXYGEN SATURATION: 93 %

## 2017-03-16 VITALS
HEART RATE: 71 BPM | DIASTOLIC BLOOD PRESSURE: 73 MMHG | SYSTOLIC BLOOD PRESSURE: 142 MMHG | OXYGEN SATURATION: 94 % | TEMPERATURE: 97.7 F

## 2017-03-16 LAB
ANION GAP SERPL CALC-SCNC: 8 MMOL/L (ref 3–11)
BUN SERPL-MCNC: 6 MG/DL (ref 7–18)
BUN/CREAT SERPL: 6.6 (ref 10–20)
CALCIUM SERPL-MCNC: 8.1 MG/DL (ref 8.5–10.1)
CHLORIDE SERPL-SCNC: 108 MMOL/L (ref 98–107)
CO2 SERPL-SCNC: 23 MMOL/L (ref 21–32)
CREAT CL PREDICTED SERPL C-G-VRATE: 124.5 ML/MIN
CREAT SERPL-MCNC: 0.96 MG/DL (ref 0.6–1.4)
GLUCOSE SERPL-MCNC: 79 MG/DL (ref 70–99)
MAGNESIUM SERPL-MCNC: 1.6 MG/DL (ref 1.8–2.4)
PHOSPHATE SERPL-MCNC: 2.8 MG/DL (ref 2.5–4.9)
POTASSIUM SERPL-SCNC: 3.7 MMOL/L (ref 3.5–5.1)
SODIUM SERPL-SCNC: 139 MMOL/L (ref 136–145)

## 2017-03-16 RX ADMIN — LORAZEPAM SCH MG: 1 TABLET ORAL at 08:52

## 2017-03-16 RX ADMIN — Medication SCH MG: at 08:52

## 2017-03-16 RX ADMIN — ERGOCALCIFEROL SCH INTERUNIT: 1.25 CAPSULE, LIQUID FILLED ORAL at 08:52

## 2017-03-16 RX ADMIN — DULOXETINE SCH MG: 60 CAPSULE, DELAYED RELEASE PELLETS ORAL at 08:53

## 2017-03-16 RX ADMIN — LEVOTHYROXINE SODIUM SCH MCG: 150 TABLET ORAL at 06:00

## 2017-03-16 RX ADMIN — HYDROCORTISONE SCH MG: 10 TABLET ORAL at 08:53

## 2017-03-16 RX ADMIN — PANTOPRAZOLE SCH MG: 40 TABLET, DELAYED RELEASE ORAL at 08:52

## 2017-03-16 RX ADMIN — DOCUSATE SODIUM SCH MG: 100 CAPSULE, LIQUID FILLED ORAL at 08:52

## 2017-03-16 RX ADMIN — LISINOPRIL SCH MG: 20 TABLET ORAL at 08:52

## 2017-03-16 RX ADMIN — Medication SCH TAB: at 08:52

## 2017-03-16 RX ADMIN — CALCIUM CARBONATE SCH MG: 500 TABLET ORAL at 08:52

## 2017-03-16 RX ADMIN — HYDROCODONE BITARTRATE AND ACETAMINOPHEN PRN TAB: 5; 325 TABLET ORAL at 07:33

## 2017-03-16 RX ADMIN — HYDROCODONE BITARTRATE AND ACETAMINOPHEN PRN TAB: 5; 325 TABLET ORAL at 11:57

## 2017-03-16 RX ADMIN — HYDROCODONE BITARTRATE AND ACETAMINOPHEN PRN TAB: 5; 325 TABLET ORAL at 02:27

## 2017-03-16 RX ADMIN — DIBASIC SODIUM PHOSPHATE, MONOBASIC POTASSIUM PHOSPHATE AND MONOBASIC SODIUM PHOSPHATE SCH TAB: 852; 155; 130 TABLET ORAL at 08:52

## 2017-03-16 RX ADMIN — NICOTINE SCH PATCH: 21 PATCH, EXTENDED RELEASE TRANSDERMAL at 08:53
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
## 2017-03-16 NOTE — DISCHARGE INSTRUCTIONS
Discharge Instructions


Date of Service


Mar 16, 2017.





Admission


Reason for Admission:  SHOCK





Discharge


Discharge Diagnosis / Problem:  hypotension, arf, drug over dose, alcholism, 

electrolyte abnormalities





Discharge Goals


Goal(s):  Decrease discomfort





Activity Recommendations


Activity Limitations:  resume your previous activity





.





Instructions / Follow-Up


Instructions / Follow-Up


FOLLOWUP WITH FAMILY DOCTOR  ON MARCH 20TH AT 1:10PM.





FOLLOWUP WITH ENDOCRINOLOGY DR.Jan GIL Allen MD IN 2-3 WEEKS ([facility], [address], [place] [id number] [phone])





PLEASE GO THROUGH MEDICATION LIST AS SOME OF THE MEDICATIONS HAVE BEEN STOPPED 

AND SOME NEW MEDICATIONS PRESCRIBED.





LAB:CMP WITH PO4 LEVELS  IN ONE WEEK AND FOLLOW RESULTS WITH FAMILY DOCTOR FOR 

LOW CALCIUM , POTASSIUM AND MAGNESIUM LEVELS.





TO TAKE CALCIUM AND MAGNESIUM SUPPLEMENTS UNTIL FURTHER ORDERS FROM FAMILY 

DOCTOR.





TO TAKE VITAMIN D 2000UNITS TWICE DAILY UNTIL FURTHER ORDERS FROM FAMILY DOCTOR.





FOLLOW WITH FAMILY DOCTOR FOR ATIVAN AND AMBIEN REFILLS WITH FAMILY DOCTOR AS 

[place] DRUG MONITORING PROGRAM SHOWS THEY HAVE BEEN FILLED ON MARCH 3RD 2017.





PLEASE AVOID ALCOHOL. PLEASE FOLLOW WITH OUT PATIENT ALCOHOL REHAB.





PLEASE AVOID PAIN MEDICATIONS AND SEDATIVE MEDICATIONS.





Current Hospital Diet


Patient's current hospital diet: AHA Diet (Heart Healthy)





Discharge Diet


Recommended Diet:  AHA Diet (Heart Healthy)





Pending Studies


Studies pending at discharge:  no





Medical Emergencies








.


Who to Call and When:





Medical Emergencies:  If at any time you feel your situation is an emergency, 

please call 911 immediately.





.





Non-Emergent Contact


Non-Emergency issues call your:  Primary Care Provider





.


.





"Provider Documentation" section prepared by Bro Levin.





VTE Core Measure


Inpt VTE Proph given/why not?:  Unfractionated heparin SQ, SCD's

## 2017-03-16 NOTE — PROGRESS NOTE
Internal Med Progress Note


Date of Service:


Mar 16, 2017.


Provider Documentation:





SUBJECTIVE:





no headaches


still has diarrhea but getting better


afebrile


denies any complaints


ok to go home











OBJECTIVE:





Vital Signs-as noted below





Exam:


General-alert and oriented. Not in distress


ENT-normal hearing


Neck-no neck masses


Lungs-cta b/l no wheezing no crackles


Heart-s1 and s2 heard, regular rate and rhythm no murmurs


Abdomen-soft bowel sounds present non tender no distension


Extremities-no edema no erythema 


Neuro-alert and awake


moves extremities








Lab data as noted below.


ASSESSMENT & PLAN:


CONFUSION /CHANGED MENTAL STATUS 


METABOLIC ENCEPHALOPATHY-


presented with alerted mental status , hypotension and cr of 12.


required pressor support in ICU


Most likely form toxic metabolic from Narcotic pain medications /Percocet use 


also hx of alcoholism


currently mental status at baseline and hemodynamically stable


willing to go to Alcohol rehab as out patient if insurance covers


stable currently 


discharged home








Chronic alcohol abuse/ NARCOTIC /SEDATIVE MEDICATION USE : 


hx of long term alcohol abuse ; 4-6 shots a day ? 


information seems to be obtained form Wife 


pt had 3 episodes of DUI , [place] 


was on  House arrest for 6 months 


currently under probation 


patient dose not want to go back to assisted , wants to go back home


information for potential rehab center given to pt by CM , referral faxed to 

[facility] [place], [facility] [place], [place] [facility], [place], 

and [place].  .


stable 





ELECTROLYTE DERANGEMENTS; 


has diarrhea since last several days


magnesium replacement as per nephrology


improving


discharged on magnesium and calcium supplemets


f/u with pcp








HYPOVOLUMIC SHOCK 


on presentation


Most likely Hypovolemic from Poor Oral Intake, Concurrent use of Diuretics


Presented with YI -Cr 12


required pressors initially


hold off HCTZ for  persisted hypokalemia 


no hctz on discharge


stable now





- Adrenal Insufficiency/PANHYPOPITUITARISM 


   on chronic PO Cortef, s/p Pituitary Resection


Was started on IV  stress dose Hydrocortisone in ICU 


appreciate input form Critical care 


Currently  on  Oral steroid regimen -PO Cortef 10 mg in AM /20 mg in PM 


cont on Levothyroxine 


testosterone replacement 


followup with pcp/endo 








ACUTE RENAL FAILURE ON CKD 3


Cr was 12 on presentation 


 likely Pre Renal  baseline cr 1.5 


resolved.Lisinopril resumed 


stopped HCTZ for persisted hypokalemia 


Nephro on board and appreciate input 











HX SEIZURES


On  gabapentin 


no seizure episode noted 





DEPRESSION /ANXIETY DISORDER : 


starts drinking heavily every time he feels depressed 


Cymbalta resumed 


feels anxious /panic attack without his Diazepam 


hx of ongoing Alcoholism 


high risk for abuse /over dose of BDZ at home -


Psychiatry consulted appreciate input 


increased Cymbalta to 120 mg PO daily 


pt seems to be getting prescription for BDZ form different providers 


pt will not  be prescribed any controlled medications during hospital discharge 


hospital records /psychiatry evaluation faxed to pt's psychiatrist Dr Ambrocio 


seems stable


f/u with psychiatry





Vitamin D deficiency


vitamin d levels 4


discharged o vitamin d supplements


f/u with pcp.











HTN 


on atenolol and lisinopril


stopped benazepril/hctz


f/u with pcp





Discharged home


Vital Signs:











  Date Time  Temp Pulse Resp B/P Pulse Ox O2 Delivery O2 Flow Rate FiO2


 


3/16/17 09:00      Room Air  


 


3/16/17 07:29 36.5 71 18 142/73 94 Room Air  


 


3/16/17 00:00 36.9 79 20 121/79 93 Room Air  


 


3/15/17 23:23      Room Air  








Lab Results:





Results Past 24 Hours








Test


  3/16/17


07:00 Range/Units


 


 


Sodium Level 139 136-145  mmol/L


 


Potassium Level 3.7 3.5-5.1  mmol/L


 


Chloride Level 108   mmol/L


 


Carbon Dioxide Level 23 21-32  mmol/L


 


Anion Gap 8.0 3-11  mmol/L


 


Blood Urea Nitrogen 6 7-18  mg/dl


 


Creatinine


  0.96


  0.60-1.40


mg/dl


 


Est Creatinine Clear Calc


Drug Dose 124.5


   ml/min


 


 


Estimated GFR (


American) 102.0


   


 


 


Estimated GFR (Non-


American 88.0


   


 


 


BUN/Creatinine Ratio 6.6 10-20  


 


Random Glucose 79 70-99  mg/dl


 


Calcium Level 8.1 8.5-10.1  mg/dl


 


Ionized Calcium


  1.07


  1.12-1.32


mmol/l


 


Phosphorus Level 2.8 2.5-4.9  mg/dl


 


Magnesium Level 1.6 1.8-2.4  mg/dl

## 2017-03-20 ENCOUNTER — HOSPITAL ENCOUNTER (OUTPATIENT)
Dept: HOSPITAL 45 - C.LABBFT | Age: 57
Discharge: HOME | End: 2017-03-20
Attending: HOSPITALIST
Payer: COMMERCIAL

## 2017-03-20 DIAGNOSIS — E87.8: Primary | ICD-10-CM

## 2017-03-20 LAB
ALBUMIN/GLOB SERPL: 0.8 {RATIO} (ref 0.9–2)
ALP SERPL-CCNC: 90 U/L (ref 45–117)
ALT SERPL-CCNC: 66 U/L (ref 12–78)
ANION GAP SERPL CALC-SCNC: 10 MMOL/L (ref 3–11)
AST SERPL-CCNC: 40 U/L (ref 15–37)
BUN SERPL-MCNC: 12 MG/DL (ref 7–18)
BUN/CREAT SERPL: 9.9 (ref 10–20)
CALCIUM SERPL-MCNC: 8.8 MG/DL (ref 8.5–10.1)
CHLORIDE SERPL-SCNC: 108 MMOL/L (ref 98–107)
CO2 SERPL-SCNC: 21 MMOL/L (ref 21–32)
CREAT SERPL-MCNC: 1.2 MG/DL (ref 0.6–1.4)
GLOBULIN SER-MCNC: 4.5 GM/DL (ref 2.5–4)
GLUCOSE SERPL-MCNC: 87 MG/DL (ref 70–99)
POTASSIUM SERPL-SCNC: 4.3 MMOL/L (ref 3.5–5.1)
SODIUM SERPL-SCNC: 139 MMOL/L (ref 136–145)

## 2017-05-17 ENCOUNTER — HOSPITAL ENCOUNTER (OUTPATIENT)
Dept: HOSPITAL 45 - C.LABBFT | Age: 57
Discharge: HOME | End: 2017-05-17
Attending: INTERNAL MEDICINE
Payer: COMMERCIAL

## 2017-05-17 DIAGNOSIS — E03.8: ICD-10-CM

## 2017-05-17 DIAGNOSIS — E27.49: ICD-10-CM

## 2017-05-17 DIAGNOSIS — E23.0: Primary | ICD-10-CM

## 2017-05-17 DIAGNOSIS — E23.6: ICD-10-CM

## 2017-05-17 LAB
EOSINOPHIL NFR BLD AUTO: 230 K/UL (ref 130–400)
HCT VFR BLD CALC: 51.4 % (ref 42–52)
MCH RBC QN AUTO: 27.9 PG (ref 25–34)
MCHC RBC AUTO-ENTMCNC: 30.4 G/DL (ref 32–36)
MCV RBC AUTO: 91.9 FL (ref 80–100)
NRBC BLD AUTO-RTO: 0.3 %
PMV BLD AUTO: 10.7 FL (ref 7.4–10.4)
RBC # BLD AUTO: 5.59 M/UL (ref 4.7–6.1)
WBC # BLD AUTO: 7.81 K/UL (ref 4.8–10.8)

## 2017-05-24 LAB
IGF-I SERPL-MCNC: 38 NG/ML (ref 50–317)
ILGF1 Z SCORE MALE: -2.4 SD
TESTOST SERPL-MCNC: 775 NG/DL (ref 250–1100)

## 2017-08-19 ENCOUNTER — HOSPITAL ENCOUNTER (INPATIENT)
Dept: HOSPITAL 45 - C.EDB | Age: 57
LOS: 2 days | Discharge: HOME | DRG: 682 | End: 2017-08-21
Attending: HOSPITALIST | Admitting: HOSPITALIST
Payer: COMMERCIAL

## 2017-08-19 VITALS — HEART RATE: 77 BPM | DIASTOLIC BLOOD PRESSURE: 72 MMHG | SYSTOLIC BLOOD PRESSURE: 112 MMHG

## 2017-08-19 VITALS — HEART RATE: 74 BPM | DIASTOLIC BLOOD PRESSURE: 57 MMHG | SYSTOLIC BLOOD PRESSURE: 81 MMHG | OXYGEN SATURATION: 93 %

## 2017-08-19 VITALS — OXYGEN SATURATION: 93 %

## 2017-08-19 VITALS — SYSTOLIC BLOOD PRESSURE: 71 MMHG | HEART RATE: 73 BPM | DIASTOLIC BLOOD PRESSURE: 37 MMHG | OXYGEN SATURATION: 88 %

## 2017-08-19 VITALS — DIASTOLIC BLOOD PRESSURE: 58 MMHG | SYSTOLIC BLOOD PRESSURE: 81 MMHG | HEART RATE: 80 BPM

## 2017-08-19 VITALS — DIASTOLIC BLOOD PRESSURE: 51 MMHG | SYSTOLIC BLOOD PRESSURE: 85 MMHG | OXYGEN SATURATION: 95 % | HEART RATE: 68 BPM

## 2017-08-19 VITALS
SYSTOLIC BLOOD PRESSURE: 105 MMHG | TEMPERATURE: 97.7 F | DIASTOLIC BLOOD PRESSURE: 64 MMHG | OXYGEN SATURATION: 92 % | HEART RATE: 78 BPM

## 2017-08-19 VITALS
OXYGEN SATURATION: 93 % | TEMPERATURE: 97.7 F | HEART RATE: 78 BPM | DIASTOLIC BLOOD PRESSURE: 72 MMHG | SYSTOLIC BLOOD PRESSURE: 112 MMHG

## 2017-08-19 VITALS
BODY MASS INDEX: 44.1 KG/M2 | BODY MASS INDEX: 44.1 KG/M2 | WEIGHT: 315 LBS | HEIGHT: 71 IN | HEIGHT: 71 IN | WEIGHT: 315 LBS

## 2017-08-19 VITALS — HEART RATE: 73 BPM | DIASTOLIC BLOOD PRESSURE: 41 MMHG | OXYGEN SATURATION: 88 % | SYSTOLIC BLOOD PRESSURE: 69 MMHG

## 2017-08-19 VITALS — DIASTOLIC BLOOD PRESSURE: 54 MMHG | HEART RATE: 68 BPM | SYSTOLIC BLOOD PRESSURE: 91 MMHG

## 2017-08-19 DIAGNOSIS — R10.9: ICD-10-CM

## 2017-08-19 DIAGNOSIS — Z82.49: ICD-10-CM

## 2017-08-19 DIAGNOSIS — I95.9: ICD-10-CM

## 2017-08-19 DIAGNOSIS — M48.00: ICD-10-CM

## 2017-08-19 DIAGNOSIS — F10.10: ICD-10-CM

## 2017-08-19 DIAGNOSIS — E89.3: ICD-10-CM

## 2017-08-19 DIAGNOSIS — I12.9: ICD-10-CM

## 2017-08-19 DIAGNOSIS — E86.0: ICD-10-CM

## 2017-08-19 DIAGNOSIS — F32.9: ICD-10-CM

## 2017-08-19 DIAGNOSIS — N17.9: Primary | ICD-10-CM

## 2017-08-19 DIAGNOSIS — T42.6X1A: ICD-10-CM

## 2017-08-19 DIAGNOSIS — G40.909: ICD-10-CM

## 2017-08-19 DIAGNOSIS — N18.3: ICD-10-CM

## 2017-08-19 DIAGNOSIS — G92: ICD-10-CM

## 2017-08-19 LAB
ALP SERPL-CCNC: 75 U/L (ref 45–117)
ALT SERPL-CCNC: 54 U/L (ref 12–78)
ANION GAP SERPL CALC-SCNC: 14 MMOL/L (ref 3–11)
ANION GAP SERPL CALC-SCNC: 16 MMOL/L (ref 16–25)
ANION GAP SERPL CALC-SCNC: 9 MMOL/L (ref 3–11)
APPEARANCE UR: CLEAR
AST SERPL-CCNC: 138 U/L (ref 15–37)
BASOPHILS # BLD: 0.05 K/UL (ref 0–0.2)
BASOPHILS NFR BLD: 0.6 %
BENZODIAZ UR-MCNC: (no result) UG/L
BILIRUB UR-MCNC: (no result) MG/DL
BUN SERPL-MCNC: 52 MG/DL (ref 7–18)
BUN SERPL-MCNC: 53 MG/DL (ref 7–18)
BUN/CREAT SERPL: 11.4 (ref 10–20)
BUN/CREAT SERPL: 14.5 (ref 10–20)
CA-I BLD-SCNC: 0.89 MMOL/L (ref 1.12–1.32)
CALCIUM SERPL-MCNC: 8 MG/DL (ref 8.5–10.1)
CALCIUM SERPL-MCNC: 8.6 MG/DL (ref 8.5–10.1)
CHLORIDE BLD-SCNC: 102 MEQ/L (ref 101–112)
CHLORIDE SERPL-SCNC: 103 MMOL/L (ref 98–107)
CHLORIDE SERPL-SCNC: 97 MMOL/L (ref 98–107)
CO2 SERPL-SCNC: 23 MMOL/L (ref 21–32)
CO2 SERPL-SCNC: 24 MMOL/L (ref 21–32)
COLOR UR: YELLOW
COMPLETE: YES
CREAT BLD-MCNC: 4.9 MG/DL (ref 0.6–1.3)
CREAT CL PREDICTED SERPL C-G-VRATE: 26.4 ML/MIN
CREAT CL PREDICTED SERPL C-G-VRATE: 35.2 ML/MIN
CREAT SERPL-MCNC: 3.6 MG/DL (ref 0.6–1.4)
CREAT SERPL-MCNC: 4.6 MG/DL (ref 0.6–1.4)
EOSINOPHIL NFR BLD AUTO: 245 K/UL (ref 130–400)
GLUCOSE SERPL-MCNC: 107 MG/DL (ref 70–99)
GLUCOSE SERPL-MCNC: 91 MG/DL (ref 70–99)
HCT VFR BLD AUTO: 44 % (ref 42–52)
HCT VFR BLD CALC: 43.4 % (ref 42–52)
HGB BLD-MCNC: 15 G/DL (ref 14–18)
IG%: 0.2 %
IMM GRANULOCYTES NFR BLD AUTO: 23.4 %
INR PPP: 1 (ref 0.9–1.1)
ISTAT CARBON DIOXIDE: 20 MEQ/L (ref 24–31)
LYMPHOCYTES # BLD: 1.96 K/UL (ref 1.2–3.4)
MAGNESIUM SERPL-MCNC: 2.6 MG/DL (ref 1.8–2.4)
MANUAL MICROSCOPIC REQUIRED?: NO
MCH RBC QN AUTO: 29.7 PG (ref 25–34)
MCHC RBC AUTO-ENTMCNC: 32.9 G/DL (ref 32–36)
MCV RBC AUTO: 90 FL (ref 80–100)
MONOCYTES NFR BLD: 3.9 %
NEUTROPHILS # BLD AUTO: 2 %
NEUTROPHILS NFR BLD AUTO: 69.9 %
NITRITE UR QL STRIP: (no result)
PCP UR-MCNC: (no result) UG/L
PH UR STRIP: 5.5 [PH] (ref 4.5–7.5)
PMV BLD AUTO: 10.5 FL (ref 7.4–10.4)
POTASSIUM SERPL-SCNC: 3.3 MMOL/L (ref 3.5–5.1)
POTASSIUM SERPL-SCNC: 3.3 MMOL/L (ref 3.5–5.1)
PROTHROMBIN TIME: 10.4 SECONDS (ref 9–12)
RBC # BLD AUTO: 4.82 M/UL (ref 4.7–6.1)
REVIEW REQ?: NO
SODIUM BLD-SCNC: 132 MEQ/L (ref 135–144)
SODIUM SERPL-SCNC: 134 MMOL/L (ref 136–145)
SODIUM SERPL-SCNC: 136 MMOL/L (ref 136–145)
SP GR UR STRIP: 1.01 (ref 1–1.03)
URINE EPITHELIAL CELL AUTO: (no result) /LPF (ref 0–5)
UROBILINOGEN UR-MCNC: (no result) MG/DL
WBC # BLD AUTO: 8.38 K/UL (ref 4.8–10.8)
ZZUR CULT IF INDIC CLEAN CATCH: NO

## 2017-08-19 RX ADMIN — GABAPENTIN SCH MG: 600 TABLET, FILM COATED ORAL at 22:00

## 2017-08-19 RX ADMIN — HYDROCORTISONE SCH MG: 10 TABLET ORAL at 21:59

## 2017-08-19 RX ADMIN — HEPARIN SODIUM SCH UNIT: 10000 INJECTION, SOLUTION INTRAVENOUS; SUBCUTANEOUS at 23:00

## 2017-08-19 NOTE — DIAGNOSTIC IMAGING REPORT
LUMBAR SPINE WITHOUT



CLINICAL HISTORY: Low back pain. History of compression fracture.    



COMPARISON STUDY:  Lumbar spine CT April 28, 2015.



FINDINGS: For purposes of numbering on this exam, the L5-S1 disc space is

assigned to axial image 195 of 233. Alignment of the lumbar spine is anatomic.

There is an old moderate L5 compression fracture. This was shown on exam of

April 28, 2015. There is mild loss of height of the superior plate of T12 which

has increased since prior exam. However, this likely reflects an old mild

compression fracture. No acute fracture is identified. Mild loss of height with

concavity of the inferior endplate of L5 is unchanged. Paravertebral soft

tissues are unremarkable. No suspicious lesion is present. The central canal and

neural foramen are suboptimally assessed by CT technique. Hour, there is

suspected moderate to severe central canal stenosis at L3-L4 and L4-L5 due to

disc bulge, ligamentous hypertrophy and facet arthrosis.



IMPRESSION:  



1. No acute lumbar spine fracture or subluxation.



2. Old L4 compression fracture. Mild T12 compression fracture which is

age-indeterminate but likely old.



3. Moderate to severe central canal stenosis at L3-L4 and L4-L5 which is

suboptimally assessed by CT. Moderate to severe multilevel neural foraminal

stenosis most pronounced at the L3-L4, L4-L5 and L5-S1 levels. 









Electronically signed by:  Farhat Gaming M.D.

8/19/2017 4:20 PM



Dictated Date/Time:  8/19/2017 4:11 PM

## 2017-08-19 NOTE — DIAGNOSTIC IMAGING REPORT
CT OF THE ABDOMEN AND PELVIS WITHOUT CONTRAST, STONE PROTOCOL



CLINICAL HISTORY: Right flank pain.    



COMPARISON STUDY:  CT of the abdomen and pelvis April 28, 2015 and renal

ultrasound March 15, 2017.



TECHNIQUE: Helical axial images of the abdomen and pelvis were obtained without

IV or oral contrast according to renal stone protocol.  A dose lowering

technique was utilized adhering to the principles of ALARA.



FINDINGS: There are no renal, ureteral or bladder calculi. No hydronephrosis or

hydroureter is present. Fatty infiltration of the liver is noted. Evaluation of

the abdomen and pelvis is suboptimal on this unenhanced exam. There is no

biliary or pancreatic ductal dilatation. There is no peripancreatic

infiltration. There is mild gallbladder distention with possible layering

material within the gallbladder. There is no significant pericholecystic

infiltration. There is no evidence for a bowel obstruction. No pneumatosis, free

air or portal venous gas is present. The appendix is normal. There is no ascites

or lymphadenopathy. Lumbar spine will be reported separately.



IMPRESSION:  



1. No urinary calculi or hydronephrosis.



2. Mild gallbladder distention with possible layering material within the

gallbladder. No significant pericholecystic infiltration.  A biliary ultrasound

could be obtained if right upper quadrant pain.



3. Fatty liver.



4. No bowel obstruction. Normal appendix.







Electronically signed by:  Farhat Gaming M.D.

8/19/2017 4:05 PM



Dictated Date/Time:  8/19/2017 3:57 PM
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
## 2017-08-19 NOTE — DIAGNOSTIC IMAGING REPORT
CHEST ONE VIEW PORTABLE



CLINICAL HISTORY: Bilateral upper shoulder discomfort.    



COMPARISON STUDY:  Chest radiograph March 7, 2017.



FINDINGS: Enlargement of the cardiac silhouette is unchanged. There is no

evidence of pulmonary edema. No pneumothorax or pleural effusion is present.

Lung volumes are diminished. The appearance of the chest is unchanged. 



IMPRESSION:  No acute cardiopulmonary findings. No change in appearance of the

chest.









Electronically signed by:  Farhat Gaming M.D.

8/19/2017 2:35 PM



Dictated Date/Time:  8/19/2017 2:34 PM
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
## 2017-08-20 VITALS — SYSTOLIC BLOOD PRESSURE: 88 MMHG | OXYGEN SATURATION: 97 % | HEART RATE: 63 BPM | DIASTOLIC BLOOD PRESSURE: 56 MMHG

## 2017-08-20 VITALS — SYSTOLIC BLOOD PRESSURE: 90 MMHG | HEART RATE: 65 BPM | OXYGEN SATURATION: 98 % | DIASTOLIC BLOOD PRESSURE: 56 MMHG

## 2017-08-20 VITALS — SYSTOLIC BLOOD PRESSURE: 99 MMHG | OXYGEN SATURATION: 96 % | DIASTOLIC BLOOD PRESSURE: 59 MMHG | HEART RATE: 64 BPM

## 2017-08-20 VITALS — DIASTOLIC BLOOD PRESSURE: 56 MMHG | HEART RATE: 66 BPM | OXYGEN SATURATION: 97 % | SYSTOLIC BLOOD PRESSURE: 98 MMHG

## 2017-08-20 VITALS
OXYGEN SATURATION: 92 % | HEART RATE: 71 BPM | DIASTOLIC BLOOD PRESSURE: 73 MMHG | SYSTOLIC BLOOD PRESSURE: 118 MMHG | TEMPERATURE: 97.88 F

## 2017-08-20 VITALS — SYSTOLIC BLOOD PRESSURE: 82 MMHG | OXYGEN SATURATION: 91 % | HEART RATE: 70 BPM | DIASTOLIC BLOOD PRESSURE: 53 MMHG

## 2017-08-20 VITALS — HEART RATE: 63 BPM | DIASTOLIC BLOOD PRESSURE: 62 MMHG | OXYGEN SATURATION: 99 % | SYSTOLIC BLOOD PRESSURE: 88 MMHG

## 2017-08-20 VITALS — SYSTOLIC BLOOD PRESSURE: 107 MMHG | DIASTOLIC BLOOD PRESSURE: 56 MMHG | HEART RATE: 63 BPM

## 2017-08-20 VITALS — OXYGEN SATURATION: 98 % | SYSTOLIC BLOOD PRESSURE: 91 MMHG | HEART RATE: 67 BPM | DIASTOLIC BLOOD PRESSURE: 72 MMHG

## 2017-08-20 VITALS — DIASTOLIC BLOOD PRESSURE: 59 MMHG | SYSTOLIC BLOOD PRESSURE: 107 MMHG | OXYGEN SATURATION: 98 % | HEART RATE: 62 BPM

## 2017-08-20 VITALS — SYSTOLIC BLOOD PRESSURE: 105 MMHG | DIASTOLIC BLOOD PRESSURE: 67 MMHG | HEART RATE: 61 BPM | OXYGEN SATURATION: 86 %

## 2017-08-20 VITALS — SYSTOLIC BLOOD PRESSURE: 130 MMHG | DIASTOLIC BLOOD PRESSURE: 71 MMHG | HEART RATE: 70 BPM

## 2017-08-20 VITALS — DIASTOLIC BLOOD PRESSURE: 67 MMHG | SYSTOLIC BLOOD PRESSURE: 106 MMHG | OXYGEN SATURATION: 97 % | HEART RATE: 60 BPM

## 2017-08-20 VITALS
OXYGEN SATURATION: 94 % | HEART RATE: 69 BPM | SYSTOLIC BLOOD PRESSURE: 87 MMHG | TEMPERATURE: 98.24 F | DIASTOLIC BLOOD PRESSURE: 54 MMHG

## 2017-08-20 VITALS
SYSTOLIC BLOOD PRESSURE: 101 MMHG | DIASTOLIC BLOOD PRESSURE: 60 MMHG | TEMPERATURE: 98.24 F | HEART RATE: 61 BPM | OXYGEN SATURATION: 95 %

## 2017-08-20 VITALS — OXYGEN SATURATION: 93 % | HEART RATE: 66 BPM | DIASTOLIC BLOOD PRESSURE: 42 MMHG | SYSTOLIC BLOOD PRESSURE: 67 MMHG

## 2017-08-20 VITALS — DIASTOLIC BLOOD PRESSURE: 54 MMHG | SYSTOLIC BLOOD PRESSURE: 76 MMHG | HEART RATE: 70 BPM | OXYGEN SATURATION: 91 %

## 2017-08-20 VITALS
DIASTOLIC BLOOD PRESSURE: 57 MMHG | OXYGEN SATURATION: 95 % | TEMPERATURE: 98.42 F | SYSTOLIC BLOOD PRESSURE: 87 MMHG | HEART RATE: 70 BPM

## 2017-08-20 VITALS — HEART RATE: 59 BPM | DIASTOLIC BLOOD PRESSURE: 64 MMHG | OXYGEN SATURATION: 97 % | SYSTOLIC BLOOD PRESSURE: 108 MMHG

## 2017-08-20 VITALS — SYSTOLIC BLOOD PRESSURE: 77 MMHG | HEART RATE: 65 BPM | DIASTOLIC BLOOD PRESSURE: 52 MMHG | OXYGEN SATURATION: 99 %

## 2017-08-20 VITALS — SYSTOLIC BLOOD PRESSURE: 94 MMHG | OXYGEN SATURATION: 96 % | HEART RATE: 67 BPM | DIASTOLIC BLOOD PRESSURE: 56 MMHG

## 2017-08-20 VITALS — HEART RATE: 63 BPM | SYSTOLIC BLOOD PRESSURE: 91 MMHG | OXYGEN SATURATION: 95 % | DIASTOLIC BLOOD PRESSURE: 56 MMHG

## 2017-08-20 VITALS — SYSTOLIC BLOOD PRESSURE: 172 MMHG | DIASTOLIC BLOOD PRESSURE: 126 MMHG | HEART RATE: 65 BPM

## 2017-08-20 VITALS — SYSTOLIC BLOOD PRESSURE: 101 MMHG | HEART RATE: 61 BPM | DIASTOLIC BLOOD PRESSURE: 60 MMHG | OXYGEN SATURATION: 95 %

## 2017-08-20 VITALS — OXYGEN SATURATION: 93 %

## 2017-08-20 LAB
ALP SERPL-CCNC: 73 U/L (ref 45–117)
ALT SERPL-CCNC: 77 U/L (ref 12–78)
ANION GAP SERPL CALC-SCNC: 8 MMOL/L (ref 3–11)
AST SERPL-CCNC: 261 U/L (ref 15–37)
BASOPHILS # BLD: 0.02 K/UL (ref 0–0.2)
BASOPHILS NFR BLD: 0.3 %
BUN SERPL-MCNC: 51 MG/DL (ref 7–18)
BUN/CREAT SERPL: 16.5 (ref 10–20)
CALCIUM SERPL-MCNC: 7.7 MG/DL (ref 8.5–10.1)
CHLORIDE SERPL-SCNC: 106 MMOL/L (ref 98–107)
CO2 SERPL-SCNC: 25 MMOL/L (ref 21–32)
COMPLETE: YES
CREAT CL PREDICTED SERPL C-G-VRATE: 40.8 ML/MIN
CREAT SERPL-MCNC: 3.1 MG/DL (ref 0.6–1.4)
EOSINOPHIL NFR BLD AUTO: 181 K/UL (ref 130–400)
GLUCOSE SERPL-MCNC: 130 MG/DL (ref 70–99)
HCT VFR BLD CALC: 38.8 % (ref 42–52)
IG%: 0.4 %
IMM GRANULOCYTES NFR BLD AUTO: 26.2 %
LYMPHOCYTES # BLD: 1.81 K/UL (ref 1.2–3.4)
MCH RBC QN AUTO: 30.5 PG (ref 25–34)
MCHC RBC AUTO-ENTMCNC: 33.8 G/DL (ref 32–36)
MCV RBC AUTO: 90.4 FL (ref 80–100)
MONOCYTES NFR BLD: 6.4 %
NEUTROPHILS # BLD AUTO: 2 %
NEUTROPHILS NFR BLD AUTO: 64.7 %
PMV BLD AUTO: 10.1 FL (ref 7.4–10.4)
POTASSIUM SERPL-SCNC: 3.5 MMOL/L (ref 3.5–5.1)
RBC # BLD AUTO: 4.29 M/UL (ref 4.7–6.1)
SODIUM SERPL-SCNC: 139 MMOL/L (ref 136–145)
WBC # BLD AUTO: 6.9 K/UL (ref 4.8–10.8)

## 2017-08-20 RX ADMIN — DULOXETINE SCH MG: 60 CAPSULE, DELAYED RELEASE PELLETS ORAL at 10:02

## 2017-08-20 RX ADMIN — GABAPENTIN SCH MG: 600 TABLET, FILM COATED ORAL at 09:19

## 2017-08-20 RX ADMIN — ALUMINUM ZIRCONIUM TRICHLOROHYDREX GLY SCH EA: 0.2 STICK TOPICAL at 15:48

## 2017-08-20 RX ADMIN — LEVOTHYROXINE SODIUM SCH MCG: 150 TABLET ORAL at 06:21

## 2017-08-20 RX ADMIN — ALUMINUM ZIRCONIUM TRICHLOROHYDREX GLY SCH EA: 0.2 STICK TOPICAL at 23:45

## 2017-08-20 RX ADMIN — SERTRALINE HYDROCHLORIDE SCH MG: 50 TABLET, FILM COATED ORAL at 10:03

## 2017-08-20 RX ADMIN — LIDOCAINE SCH PATCH: 50 PATCH CUTANEOUS at 11:47

## 2017-08-20 RX ADMIN — HYDROCORTISONE SCH MG: 10 TABLET ORAL at 09:18

## 2017-08-20 RX ADMIN — GABAPENTIN SCH MG: 600 TABLET, FILM COATED ORAL at 14:11

## 2017-08-20 RX ADMIN — ALUMINUM ZIRCONIUM TRICHLOROHYDREX GLY SCH EA: 0.2 STICK TOPICAL at 15:49

## 2017-08-20 RX ADMIN — NICOTINE SCH PATCH: 21 PATCH, EXTENDED RELEASE TRANSDERMAL at 17:12

## 2017-08-20 RX ADMIN — Medication SCH MG: at 09:20

## 2017-08-20 RX ADMIN — GABAPENTIN SCH MG: 600 TABLET, FILM COATED ORAL at 21:15

## 2017-08-20 RX ADMIN — ALUMINUM ZIRCONIUM TRICHLOROHYDREX GLY SCH EA: 0.2 STICK TOPICAL at 09:16

## 2017-08-20 RX ADMIN — PANTOPRAZOLE SCH MG: 40 TABLET, DELAYED RELEASE ORAL at 09:19

## 2017-08-20 RX ADMIN — HEPARIN SODIUM SCH UNIT: 10000 INJECTION, SOLUTION INTRAVENOUS; SUBCUTANEOUS at 06:22

## 2017-08-20 RX ADMIN — HYDROCORTISONE SCH MG: 10 TABLET ORAL at 21:14

## 2017-08-20 RX ADMIN — HEPARIN SODIUM SCH UNIT: 10000 INJECTION, SOLUTION INTRAVENOUS; SUBCUTANEOUS at 14:15

## 2017-08-20 RX ADMIN — ALUMINUM ZIRCONIUM TRICHLOROHYDREX GLY SCH EA: 0.2 STICK TOPICAL at 00:00

## 2017-08-20 RX ADMIN — SERTRALINE HYDROCHLORIDE SCH MG: 50 TABLET, FILM COATED ORAL at 21:15

## 2017-08-20 RX ADMIN — OXYCODONE AND ACETAMINOPHEN PRN TAB: 10; 325 TABLET ORAL at 19:05

## 2017-08-20 RX ADMIN — HEPARIN SODIUM SCH UNIT: 10000 INJECTION, SOLUTION INTRAVENOUS; SUBCUTANEOUS at 21:20

## 2017-08-20 NOTE — DIAGNOSTIC IMAGING REPORT
ULTRASOUND KIDNEYS AND BLADDER



CLINICAL HISTORY: Acute renal insufficiency. Right flank pain.



COMPARISON STUDY: Abdominal CT dated 8/19/2017.



TECHNIQUE: Real-time, grayscale, and color flow sonography of the kidneys and

bladder is performed. Images are reviewed in the transverse and longitudinal

planes.



FINDINGS:



Kidneys: The kidneys demonstrate mild cortical atrophy. The right kidney

measures 10.8 cm in length and the left kidney measures 11.9 cm in length. 

There is no hydronephrosis. No shadowing renal calculi are identified. There is

no sonographic evidence of contour deforming renal mass lesion. No perinephric

fluid is identified.



Bladder: The bladder is normal in appearance. Bilateral ureteral jets were seen.



Upper abdomen: Survey images of the liver show evidence of steatosis.





IMPRESSION: 



1. The kidneys demonstrate mild cortical atrophy and are without hydronephrosis.



2. The bladder is normal as visualized.



3. Hepatic steatosis.







Electronically signed by:  Dung Yu M.D.

8/20/2017 7:54 AM



Dictated Date/Time:  8/20/2017 7:53 AM

## 2017-08-20 NOTE — CRITICAL CARE PROGRESS NOTE
Critical Care Progress Note


Date of Service


Aug 20, 2017.





ICU Day


ICU Day Number:  2





Attending


Dr. Jolley





Subjective


Patient well. Denies acute overnight issues. Still complaining of back pain, 

but otherwise denies fevers/chills, CP, SOB, abdo pain, N/V. No issues with 

voiding or BM. Tolerating diet





Objective


GENERAL: Awake, obese, lying in bed, no acute distress, non-toxic 


HEAD: Normocephalic, atraumatic. No sinus tenderness.


EYES: PERRL, EOMI, normal conjunctiva and sclera


OROPHARYNX: No exudate, no erythema, lips, buccal mucosa, teeth and tongue 

carry significant evidence of heavy tobacco chewing. Mucous membranes are moist


NECK: supple, no nuchal rigidity, no adenopathy, non-tender


LUNGS: Clear to auscultation. Normal chest wall mechanics, good air entry. No 

crepitations, crackles, or wheezes


HEART: RRR, S1 and S2 normal, no murmurs appreciated


CHEST: No reproducible tenderness. 


ABDOMEN: abdomen soft, non-tender, normo-active bowel sounds, no masses, no 

rebound or guarding. 


BACK: Back is symmetrical on inspection, no deformities, diffuse low back 

tenderness, no CVA tenderness. 


SKIN: Warm, pink, dry. No erythema, rashes, or bruising.


EXTREMITIES: Grossly normal. Moving all 4 limbs, strength 5/5. Calves non 

tender.


NEURO: Alert, Ox3. No focal deficits. Normal sensorium, cranial nerves II-XII 

grossly intact, speech WNL


PSYCH: Mood and affect appropriate.





Current SOFA Score











 SOFA Score Response (Comments) Value


 


 Platelets (x10)  > 150 0


 


 Bilirubin (mg/dL)  < 1.2 0


 


 Scott Coma Score  15 0


 


 Level of Hypotension  MAP less than 70 1


 


 Creatinine (mg/dL)  2.0  -  3.4 2


 


Total  3











Assessment & Plan


Reason critically ill: 57 year old male presents with YI, hypotension, 

polysubstance abuse, possibly at risk of withdrawal.





Neuro


- CAM negative. GCS 15. Holding sedative medications.


- Thiamine ordered. Remain alert for signs of withdrawal. ASHLEIGH checklist. Neuro 

checks


- Urine drug screen negative, brings to question whether he's taking his 

prescription benzos. Acetaminophen and EtOH levels low.


- Gabapentin, duloxetine and Percocet ordered for pain management. Use other 

pain medications, particularly acetaminophen judiciously.


- If reordering Ativan, recommend change from 1g TID to 0.5mg QID (for overall 

reduced dose)


- Continue to monitor for changes in RASS





CV


- Vitals show stable HR, but hypotension in the 70-90s overnight, subsequently 

improved to the 120s upon waking


* s/p IVF x 4L since admission. But being cautious in view of h/o CHF


- Continue atenolol


- Continue to monitor on telemetry





Resp


- Saturating 93% on RA


- Continue pulse oximetry





GI/Nutrition


- Diet allowed


- LFTs normal, except AST, lipase negative. Trend


- Prophylaxis: pantoprazole





Renal/


- Electrolyte imbalance. Will supplement and monitor via BMP tomorrow.


- Hydration provided with some improvement in creatinine. Trend. Avoid 

nephrotoxic drugs.





ID


- No evidence of active infection as per renal U/S





Endo


- Continue levothyroxine, somatropin and hydrocortisone as prescribed


- Monitor blood sugars





Heme


- CBC normal. Will monitor





Access/Line


- Peripheral IV





VTE Prophylaxis


- Heparin SC q8h





Resident Physician Supervision Note:


Dr. Willis was resident physician during care of patient. I discussed the case 

with the resident and generally agree with the findings and plan.





stable for downgrade.





Documented By:  Peterson Jolley DO





Consults & Procedures


Consultants:


Hospitalist: Dr. Fairchild


Procedures:


Nil





Data


Medications:





Current Inpatient Medications








 Medications


  (Trade)  Dose


 Ordered  Sig/Joce


 Route  Start Time


 Stop Time Status Last Admin


Dose Admin


 


 Ioversol


  (Optiray 320)  125 ml  UD  PRN


 IV  8/19/17 14:30


 8/23/17 14:29   


 


 


 Albuterol/


 Ipratropium


  (Duoneb)  3 ml  Q6H  PRN


 INH  8/19/17 18:00


 9/18/17 17:59   


 


 


 Atenolol


  (Tenormin Tab)  50 mg  DAILY


 PO  8/20/17 09:00


 9/19/17 08:59   


 


 


 Folic Acid


  (Folvite Tab)  1 mg  QAM


 PO  8/20/17 09:00


 9/19/17 08:59   


 


 


 Hydrocortisone


  (Cortef Tab)  10 mg  QPM


 PO  8/19/17 21:00


 9/18/17 20:59  8/19/17 21:59


10 MG


 


 Hydrocortisone


  (Cortef Tab)  20 mg  QAM


 PO  8/20/17 09:00


 9/19/17 08:59   


 


 


 Levothyroxine


 Sodium


  (Synthroid Tab)  300 mcg  DAILYBB


 PO  8/20/17 06:00


 9/19/17 05:59   


 


 


 Thiamine HCl


  (Vitamin B-1 Tab)  100 mg  QAM


 PO  8/20/17 09:00


 9/19/17 08:59   


 


 


 Pantoprazole


 Sodium


  (Protonix Tab)  40 mg  DAILY


 PO  8/20/17 09:00


 9/19/17 08:59   


 


 


 Miscellaneous


 Information


  (Order Awaiting


 Action)  1 ea  QS


 N/A  8/20/17 00:00


 9/19/17 00:00   


 


 


 Gabapentin


  (Neurontin Tab)  600 mg  TID


 PO  8/19/17 21:00


 9/18/17 20:59  8/19/17 22:00


600 MG


 


 Heparin Sodium


  (Porcine)


  (Heparin Sq 5000


 Unit/0.5ml)  5,000 unit  Q8@0600,1400,2200


 SQ  8/19/17 23:00


 9/18/17 22:59   


 








Vital Signs:











  Date Time  Temp Pulse Resp B/P (MAP) Pulse Ox O2 Delivery O2 Flow Rate FiO2


 


8/20/17 03:00  65 18 77/52 (60) 99   


 


8/20/17 02:58  67 16 94/56 (69) 96   


 


8/20/17 02:00  67 19 91/72 (78) 98   


 


8/20/17 01:14  70 18 76/54 (61) 91   


 


8/20/17 01:01  66 23 67/42 (50) 93   


 


8/20/17 00:40  70 20 82/53 (63) 91   


 


8/20/17 00:04     93 Room Air 2.0 


 


8/20/17 00:00 36.8 69 19 87/54 (65) 94   


 


8/19/17 23:00  68 19 91/54 (66)    


 


8/19/17 22:54  68 19 85/51 (62) 95   


 


8/19/17 22:03  74 17 81/57 (65) 93   


 


8/19/17 22:02  73 19 71/37 (48) 88   


 


8/19/17 22:01  73 19 69/41 (50) 88   


 


8/19/17 21:02  80  81/58 (66)    


 


8/19/17 20:21     93 Room Air 2.0 


 


8/19/17 20:01 36.5 78 21 105/64 (78) 92   


 


8/19/17 19:50 36.5 78 24 112/72 93 Room Air  


 


8/19/17 19:04    112/72    


 


8/19/17 19:04  77 23 112/72 (85)    


 


8/19/17 18:40    108/65    


 


8/19/17 18:31  71 20     


 


8/19/17 18:26  71 21     


 


8/19/17 18:20    113/60    


 


8/19/17 18:11  72 21 88/66    


 


8/19/17 18:00    153/86    


 


8/19/17 17:56  73 22     


 


8/19/17 17:50    134/80    


 


8/19/17 17:41  75 19 128/72    


 


8/19/17 17:30    111/65    


 


8/19/17 17:26  75 22  90   


 


8/19/17 17:20    116/51    


 


8/19/17 17:11  75 24  89   


 


8/19/17 17:10    121/61    


 


8/19/17 17:09    110/59    


 


8/19/17 17:07    116/68    


 


8/19/17 17:06    127/60    


 


8/19/17 17:05    120/66    


 


8/19/17 17:04    126/62    


 


8/19/17 17:01    124/66    


 


8/19/17 16:56  75 29  89   


 


8/19/17 16:43  73      


 


8/19/17 16:41  75 18  86   


 


8/19/17 16:39    93/43    


 


8/19/17 16:25  74 20 78/38 95 Room Air  


 


8/19/17 16:25    75/39    


 


8/19/17 15:56  71 20 85/48 95 Room Air  


 


8/19/17 15:31  70 20 77/ 92 Room Air  


 


8/19/17 14:15    67/44    


 


8/19/17 14:10  70 20 73/41    


 


8/19/17 13:54 36.6 90 18 90/53 9 Room Air  








Laboratory Results:





Last 24 Hours








Test


  8/19/17


14:36 8/19/17


14:40 8/19/17


19:50 8/19/17


20:19


 


Bedside Hemoglobin 15.0 g/dl    


 


Bedside Hematocrit 44 %    


 


Bedside Sodium 132 mEq/L    


 


Bedside Potassium 4.7 mEq/L    


 


Bedside Chloride 102 mEq/L    


 


Bedside Total CO2 20 mEq/l    


 


Anion Gap 16.0 mmol/L  14.0 mmol/L   9.0 mmol/L 


 


Bedside Blood Urea Nitrogen 79 mg/dl    


 


Bedside Creatinine 4.9 mg/dl    


 


Bedside Glucose (other) 100 mg/dl    


 


Bedside Ionized Calcium (Winnie) 0.89 mmol/l    


 


White Blood Count  8.38 K/uL   


 


Red Blood Count  4.82 M/uL   


 


Hemoglobin  14.3 g/dL   


 


Hematocrit  43.4 %   


 


Mean Corpuscular Volume  90.0 fL   


 


Mean Corpuscular Hemoglobin  29.7 pg   


 


Mean Corpuscular Hemoglobin


Concent 


  32.9 g/dl 


  


  


 


 


Platelet Count  245 K/uL   


 


Mean Platelet Volume  10.5 fL   


 


Neutrophils (%) (Auto)  69.9 %   


 


Lymphocytes (%) (Auto)  23.4 %   


 


Monocytes (%) (Auto)  3.9 %   


 


Eosinophils (%) (Auto)  2.0 %   


 


Basophils (%) (Auto)  0.6 %   


 


Neutrophils # (Auto)  5.85 K/uL   


 


Lymphocytes # (Auto)  1.96 K/uL   


 


Monocytes # (Auto)  0.33 K/uL   


 


Eosinophils # (Auto)  0.17 K/uL   


 


Basophils # (Auto)  0.05 K/uL   


 


RDW Standard Deviation  56.6 fL   


 


RDW Coefficient of Variation  17.0 %   


 


Immature Granulocyte % (Auto)  0.2 %   


 


Immature Granulocyte # (Auto)  0.02 K/uL   


 


Sodium Level  134 mmol/L   136 mmol/L 


 


Potassium Level  3.3 mmol/L   3.3 mmol/L 


 


Chloride Level  97 mmol/L   103 mmol/L 


 


Carbon Dioxide Level  23 mmol/L   24 mmol/L 


 


Blood Urea Nitrogen  53 mg/dl   52 mg/dl 


 


Creatinine  4.60 mg/dl   3.60 mg/dl 


 


Est Creatinine Clear Calc


Drug Dose 


  26.4 ml/min 


  


  35.2 ml/min 


 


 


Estimated GFR (


American) 


  15.3 


  


  20.6 


 


 


Estimated GFR (Non-


American 


  13.2 


  


  17.8 


 


 


BUN/Creatinine Ratio  11.4   14.5 


 


Random Glucose  91 mg/dl   107 mg/dl 


 


Calcium Level  8.6 mg/dl   8.0 mg/dl 


 


Total Bilirubin  0.7 mg/dl   


 


Direct Bilirubin  0.2 mg/dl   


 


Aspartate Amino Transf


(AST/SGOT) 


  138 U/L 


  


  


 


 


Alanine Aminotransferase


(ALT/SGPT) 


  54 U/L 


  


  


 


 


Alkaline Phosphatase  75 U/L   


 


Troponin I  < 0.015 ng/ml   


 


Total Protein  7.9 gm/dl   


 


Albumin  3.5 gm/dl   


 


Lipase  110 U/L   


 


Acetaminophen Level  4 ug/ml   


 


Urine Color   YELLOW  


 


Urine Appearance   CLEAR  


 


Urine pH   5.5  


 


Urine Specific Gravity   1.014  


 


Urine Protein   1+  


 


Urine Glucose (UA)   NEG  


 


Urine Ketones   NEG  


 


Urine Occult Blood   3+  


 


Urine Nitrite   NEG  


 


Urine Bilirubin   NEG  


 


Urine Urobilinogen   NEG  


 


Urine Leukocyte Esterase   NEG  


 


Urine WBC (Auto)   1-5 /hpf  


 


Urine RBC (Auto)   0-4 /hpf  


 


Urine Hyaline Casts (Auto)   1-5 /lpf  


 


Urine Epithelial Cells (Auto)   0-5 /lpf  


 


Urine Bacteria (Auto)   NEG  


 


Urine Opiates Screen   NEG  


 


Urine Methadone, Qualitative   NEG  


 


Urine Barbiturates   NEG  


 


Urine Phencyclidine (PCP)


Level 


  


  NEG 


  


 


 


Ur


Amphetamine/Methamphetamine 


  


  NEG 


  


 


 


MDMA (Ecstasy) Screen   NEG  


 


Urine Benzodiazepines Screen   NEG  


 


Urine Cocaine Metabolite   NEG  


 


Urine Marijuana (THC)   NEG  


 


Magnesium Level    2.6 mg/dl 


 


Ethyl Alcohol mg/dL    < 3.0 mg/dl 


 


Test


  8/19/17


21:45 


  


  


 


 


Prothrombin Time 10.4 SECONDS    


 


Prothromb Time International


Ratio 1.0 


  


  


  


 











Resident Tracking


Resident Involvement:  Resident Care Provided


Care Provided:  Adult Hospital Medicine

## 2017-08-20 NOTE — PROGRESS NOTE
Internal Med Progress Note


Date of Service:


Aug 20, 2017.


Provider Documentation:





SUBJECTIVE:





States having back pain which about 8/10


Denies chest pain, SOB, dizziness


Also has R thigh pain


No other complaints 


Denies bowel/bladder incontinence, Weakness, numbness





OBJECTIVE:





Vital Signs-as noted below





Physical Exam:


Vitals signs as noted above 


General Appearance:Obese, no apparent distress


Head:  normocephalic, Atraumatic 


Eyes:  normal inspection, EOMI, PERRL


Neck:  supple, Trachea midline


Respiratory/Chest: Normal breath sounds, CTA


Cardiovascular: S1, S2, No murmur


Abdomen/GI:Soft, Non tender, Bowel sounds present


Extremities/Musculoskelatal:normal inspection, no edema 


Neurologic/Psych:AAOX3, grossly no focal neurological deficits 


Skin:  normal color, warm





Lab data as noted below.


ASSESSMENT & PLAN:





Hypotension: 


Likely secondary to dehydration 


Also a component of  adrenal Insufficiency


Improved


S/P IV fluids


Monitor  


Possible sleep apnea could be contributing 








Altered Mental Status:


Metabolic/Toxic Encephalopathy:


H/O Substance abuse (Admits to Snorting Ambien)


Also has renal insufficiency 


Tox screen negative


Neuro checks


Resolved








YI on CKD III


Baseline Cr:1.5


Cr:4.9>>.3.1 


S/P IV fluids


Monitor renal function


Hold ACEI, Avoid NSAIDs


Avoid nephro toxic agents


Renal USD: mild cortical atrophy without hydronephrosis








H/O Adrenal Insufficiency/Pan hypopituitarism:


s/p Pituitary Resection


Continue PO Cortef, Humatrope, levothyroxine, Testosterone








H/O seizures


Continue gabapentin 


no acute issues currently 








H/O depression/Anxiety:


Resume home meds  


 





HTN:


Currently hypotensive


Hold HTN meds for now








Spinal Stenosis:


PT/OT


Will consider Orthopedics if necessary





DVT Px:


Heparin SQ





Code Status:


Full Code





Disposition:


Monitor in Tele


Vital Signs:











  Date Time  Temp Pulse Resp B/P (MAP) Pulse Ox O2 Delivery O2 Flow Rate FiO2


 


8/20/17 08:15 36.6 71 16 118/73 (88) 92 Room Air  


 


8/20/17 08:15      Room Air  


 


8/20/17 07:34 36.8 61 16  95  2.0 


 


8/20/17 07:00  61 16 101/60 (74) 95   


 


8/20/17 06:45  63 17 88/56 (67) 97   


 


8/20/17 06:30  62 19 107/59 (75) 98   


 


8/20/17 06:15  63 17 91/56 (68) 95   


 


8/20/17 06:00  59 17 108/64 (79) 97   


 


8/20/17 05:45  60 21 106/67 (80) 97   


 


8/20/17 05:43  61 17 105/67 (80) 86   


 


8/20/17 05:31  65 17 172/126 (141)    


 


8/20/17 05:00  63 17 88/62 (71) 99   


 


8/20/17 04:45  64 19 99/59 (72) 96   


 


8/20/17 04:34     93 Room Air 2.0 


 


8/20/17 04:30  66 18 98/56 (70) 97   


 


8/20/17 04:15  65 16 90/56 (67) 98   


 


8/20/17 04:00  63 16 107/56 (73)    


 


8/20/17 03:00  65 18 77/52 (60) 99   


 


8/20/17 02:58  67 16 94/56 (69) 96   


 


8/20/17 02:00  67 19 91/72 (78) 98   


 


8/20/17 01:14  70 18 76/54 (61) 91   


 


8/20/17 01:01  66 23 67/42 (50) 93   


 


8/20/17 00:40  70 20 82/53 (63) 91   


 


8/20/17 00:04     93 Room Air 2.0 


 


8/20/17 00:00 36.8 69 19 87/54 (65) 94   


 


8/19/17 23:00  68 19 91/54 (66)    


 


8/19/17 22:54  68 19 85/51 (62) 95   


 


8/19/17 22:03  74 17 81/57 (65) 93   


 


8/19/17 22:02  73 19 71/37 (48) 88   


 


8/19/17 22:01  73 19 69/41 (50) 88   


 


8/19/17 21:02  80  81/58 (66)    


 


8/19/17 20:21     93 Room Air 2.0 


 


8/19/17 20:01 36.5 78 21 105/64 (78) 92   


 


8/19/17 19:50 36.5 78 24 112/72 93 Room Air  


 


8/19/17 19:04    112/72    


 


8/19/17 19:04  77 23 112/72 (85)    


 


8/19/17 18:40    108/65    


 


8/19/17 18:31  71 20     


 


8/19/17 18:26  71 21     


 


8/19/17 18:20    113/60    


 


8/19/17 18:11  72 21 88/66    


 


8/19/17 18:00    153/86    


 


8/19/17 17:56  73 22     


 


8/19/17 17:50    134/80    


 


8/19/17 17:41  75 19 128/72    


 


8/19/17 17:30    111/65    


 


8/19/17 17:26  75 22  90   


 


8/19/17 17:20    116/51    


 


8/19/17 17:11  75 24  89   


 


8/19/17 17:10    121/61    


 


8/19/17 17:09    110/59    


 


8/19/17 17:07    116/68    


 


8/19/17 17:06    127/60    


 


8/19/17 17:05    120/66    


 


8/19/17 17:04    126/62    


 


8/19/17 17:01    124/66    


 


8/19/17 16:56  75 29  89   


 


8/19/17 16:43  73      


 


8/19/17 16:41  75 18  86   


 


8/19/17 16:39    93/43    


 


8/19/17 16:25  74 20 78/38 95 Room Air  


 


8/19/17 16:25    75/39    


 


8/19/17 15:56  71 20 85/48 95 Room Air  


 


8/19/17 15:31  70 20 77/ 92 Room Air  


 


8/19/17 14:15    67/44    


 


8/19/17 14:10  70 20 73/41    


 


8/19/17 13:54 36.6 90 18 90/53 9 Room Air  








Lab Results:





Results Past 24 Hours








Test


  8/19/17


14:36 8/19/17


14:40 8/19/17


19:50 8/19/17


20:19 Range/Units


 


 


Bedside Hemoglobin 15.0    14.0-18.0  g/dl


 


Bedside Hematocrit 44    42-52  %


 


Bedside Sodium 132    135-144  mEq/L


 


Bedside Potassium 4.7    3.3-5.0  mEq/L


 


Bedside Chloride 102    101-112  mEq/L


 


Bedside Total CO2 20    24-31  mEq/l


 


Anion Gap 16.0 14.0  9.0 3-11  mmol/L


 


Bedside Blood Urea Nitrogen 79    7-18  mg/dl


 


Bedside Creatinine 4.9    0.6-1.3  mg/dl


 


Bedside Glucose (other) 100    70-99  mg/dl


 


Bedside Ionized Calcium (Winnie)


  0.89


  


  


  


  1.12-1.32


mmol/l


 


White Blood Count  8.38   4.8-10.8  K/uL


 


Red Blood Count  4.82   4.7-6.1  M/uL


 


Hemoglobin  14.3   14.0-18.0  g/dL


 


Hematocrit  43.4   42-52  %


 


Mean Corpuscular Volume  90.0     fL


 


Mean Corpuscular Hemoglobin  29.7   25-34  pg


 


Mean Corpuscular Hemoglobin


Concent 


  32.9


  


  


  32-36  g/dl


 


 


Platelet Count  245   130-400  K/uL


 


Mean Platelet Volume  10.5   7.4-10.4  fL


 


Neutrophils (%) (Auto)  69.9    %


 


Lymphocytes (%) (Auto)  23.4    %


 


Monocytes (%) (Auto)  3.9    %


 


Eosinophils (%) (Auto)  2.0    %


 


Basophils (%) (Auto)  0.6    %


 


Neutrophils # (Auto)  5.85   1.4-6.5  K/uL


 


Lymphocytes # (Auto)  1.96   1.2-3.4  K/uL


 


Monocytes # (Auto)  0.33   0.11-0.59  K/uL


 


Eosinophils # (Auto)  0.17   0-0.5  K/uL


 


Basophils # (Auto)  0.05   0-0.2  K/uL


 


RDW Standard Deviation  56.6   36.4-46.3  fL


 


RDW Coefficient of Variation  17.0   11.5-14.5  %


 


Immature Granulocyte % (Auto)  0.2    %


 


Immature Granulocyte # (Auto)  0.02   0.00-0.02  K/uL


 


Sodium Level  134  136 136-145  mmol/L


 


Potassium Level  3.3  3.3 3.5-5.1  mmol/L


 


Chloride Level  97  103   mmol/L


 


Carbon Dioxide Level  23  24 21-32  mmol/L


 


Blood Urea Nitrogen  53  52 7-18  mg/dl


 


Creatinine


  


  4.60


  


  3.60


  0.60-1.40


mg/dl


 


Est Creatinine Clear Calc


Drug Dose 


  26.4


  


  35.2


   ml/min


 


 


Estimated GFR (


American) 


  15.3


  


  20.6


   


 


 


Estimated GFR (Non-


American 


  13.2


  


  17.8


   


 


 


BUN/Creatinine Ratio  11.4  14.5 10-20  


 


Random Glucose  91  107 70-99  mg/dl


 


Calcium Level  8.6  8.0 8.5-10.1  mg/dl


 


Total Bilirubin  0.7   0.2-1  mg/dl


 


Direct Bilirubin  0.2   0-0.2  mg/dl


 


Aspartate Amino Transf


(AST/SGOT) 


  138


  


  


  15-37  U/L


 


 


Alanine Aminotransferase


(ALT/SGPT) 


  54


  


  


  12-78  U/L


 


 


Alkaline Phosphatase  75     U/L


 


Troponin I  < 0.015   0-0.045  ng/ml


 


Total Protein  7.9   6.4-8.2  gm/dl


 


Albumin  3.5   3.4-5.0  gm/dl


 


Lipase  110     U/L


 


Acetaminophen Level  4   10-30  ug/ml


 


Urine Color   YELLOW   


 


Urine Appearance   CLEAR  CLEAR  


 


Urine pH   5.5  4.5-7.5  


 


Urine Specific Gravity   1.014  1.000-1.030  


 


Urine Protein   1+  NEG  


 


Urine Glucose (UA)   NEG  NEG  


 


Urine Ketones   NEG  NEG  


 


Urine Occult Blood   3+  NEG  


 


Urine Nitrite   NEG  NEG  


 


Urine Bilirubin   NEG  NEG  


 


Urine Urobilinogen   NEG  NEG  


 


Urine Leukocyte Esterase   NEG  NEG  


 


Urine WBC (Auto)   1-5  0-5  /hpf


 


Urine RBC (Auto)   0-4  0-4  /hpf


 


Urine Hyaline Casts (Auto)   1-5  0-5  /lpf


 


Urine Epithelial Cells (Auto)   0-5  0-5  /lpf


 


Urine Bacteria (Auto)   NEG  NEG  


 


Urine Opiates Screen   NEG  NEG  


 


Urine Methadone, Qualitative   NEG  NEG  


 


Urine Barbiturates   NEG  NEG  


 


Urine Phencyclidine (PCP)


Level 


  


  NEG


  


  NEG  


 


 


Ur


Amphetamine/Methamphetamine 


  


  NEG


  


  NEG  


 


 


MDMA (Ecstasy) Screen   NEG  NEG  


 


Urine Benzodiazepines Screen   NEG  NEG  


 


Urine Cocaine Metabolite   NEG  NEG  


 


Urine Marijuana (THC)   NEG  NEG  


 


Magnesium Level    2.6 1.8-2.4  mg/dl


 


Ethyl Alcohol mg/dL    < 3.0 0-3  mg/dl


 


Test


  8/19/17


21:45 8/20/17


05:55 8/20/17


11:15 


  Range/Units


 


 


Prothrombin Time


  10.4


  


  


  


  9.0-12.0


SECONDS


 


Prothromb Time International


Ratio 1.0


  


  


  


  0.9-1.1  


 


 


White Blood Count  6.90   4.8-10.8  K/uL


 


Red Blood Count  4.29   4.7-6.1  M/uL


 


Hemoglobin  13.1   14.0-18.0  g/dL


 


Hematocrit  38.8   42-52  %


 


Mean Corpuscular Volume  90.4     fL


 


Mean Corpuscular Hemoglobin  30.5   25-34  pg


 


Mean Corpuscular Hemoglobin


Concent 


  33.8


  


  


  32-36  g/dl


 


 


Platelet Count  181   130-400  K/uL


 


Mean Platelet Volume  10.1   7.4-10.4  fL


 


Neutrophils (%) (Auto)  64.7    %


 


Lymphocytes (%) (Auto)  26.2    %


 


Monocytes (%) (Auto)  6.4    %


 


Eosinophils (%) (Auto)  2.0    %


 


Basophils (%) (Auto)  0.3    %


 


Neutrophils # (Auto)  4.46   1.4-6.5  K/uL


 


Lymphocytes # (Auto)  1.81   1.2-3.4  K/uL


 


Monocytes # (Auto)  0.44   0.11-0.59  K/uL


 


Eosinophils # (Auto)  0.14   0-0.5  K/uL


 


Basophils # (Auto)  0.02   0-0.2  K/uL


 


RDW Standard Deviation  57.2   36.4-46.3  fL


 


RDW Coefficient of Variation  17.3   11.5-14.5  %


 


Immature Granulocyte % (Auto)  0.4    %


 


Immature Granulocyte # (Auto)  0.03   0.00-0.02  K/uL


 


Sodium Level  139   136-145  mmol/L


 


Potassium Level  3.5   3.5-5.1  mmol/L


 


Chloride Level  106     mmol/L


 


Carbon Dioxide Level  25   21-32  mmol/L


 


Anion Gap  8.0   3-11  mmol/L


 


Blood Urea Nitrogen  51   7-18  mg/dl


 


Creatinine


  


  3.10


  


  


  0.60-1.40


mg/dl


 


Est Creatinine Clear Calc


Drug Dose 


  40.8


  


  


   ml/min


 


 


Estimated GFR (


American) 


  24.7


  


  


   


 


 


Estimated GFR (Non-


American 


  21.3


  


  


   


 


 


BUN/Creatinine Ratio  16.5   10-20  


 


Random Glucose  130   70-99  mg/dl


 


Calcium Level  7.7   8.5-10.1  mg/dl


 


Total Bilirubin  0.2   0.2-1  mg/dl


 


Direct Bilirubin  < 0.1   0-0.2  mg/dl


 


Aspartate Amino Transf


(AST/SGOT) 


  261


  


  


  15-37  U/L


 


 


Alanine Aminotransferase


(ALT/SGPT) 


  77


  


  


  12-78  U/L


 


 


Alkaline Phosphatase  73     U/L


 


Total Protein  6.6   6.4-8.2  gm/dl


 


Albumin  2.8   3.4-5.0  gm/dl


 


Acetaminophen Level  3   10-30  ug/ml


 


Bedside Glucose   123  70-99  mg/dl

## 2017-08-21 VITALS
TEMPERATURE: 97.88 F | DIASTOLIC BLOOD PRESSURE: 63 MMHG | SYSTOLIC BLOOD PRESSURE: 110 MMHG | OXYGEN SATURATION: 93 % | HEART RATE: 68 BPM

## 2017-08-21 VITALS
HEART RATE: 59 BPM | SYSTOLIC BLOOD PRESSURE: 130 MMHG | DIASTOLIC BLOOD PRESSURE: 86 MMHG | TEMPERATURE: 97.88 F | OXYGEN SATURATION: 96 %

## 2017-08-21 VITALS
OXYGEN SATURATION: 96 % | DIASTOLIC BLOOD PRESSURE: 86 MMHG | TEMPERATURE: 97.88 F | HEART RATE: 59 BPM | SYSTOLIC BLOOD PRESSURE: 130 MMHG

## 2017-08-21 LAB
ANION GAP SERPL CALC-SCNC: 5 MMOL/L (ref 3–11)
BASOPHILS # BLD: 0.05 K/UL (ref 0–0.2)
BASOPHILS NFR BLD: 0.9 %
BUN SERPL-MCNC: 35 MG/DL (ref 7–18)
BUN/CREAT SERPL: 26.8 (ref 10–20)
CALCIUM SERPL-MCNC: 8.3 MG/DL (ref 8.5–10.1)
CHLORIDE SERPL-SCNC: 108 MMOL/L (ref 98–107)
CO2 SERPL-SCNC: 29 MMOL/L (ref 21–32)
COMPLETE: YES
CREAT CL PREDICTED SERPL C-G-VRATE: 97 ML/MIN
CREAT SERPL-MCNC: 1.3 MG/DL (ref 0.6–1.4)
EOSINOPHIL NFR BLD AUTO: 182 K/UL (ref 130–400)
GLUCOSE SERPL-MCNC: 89 MG/DL (ref 70–99)
HCT VFR BLD CALC: 38.4 % (ref 42–52)
IG%: 0.5 %
IMM GRANULOCYTES NFR BLD AUTO: 46.8 %
LYMPHOCYTES # BLD: 2.69 K/UL (ref 1.2–3.4)
MCH RBC QN AUTO: 29.8 PG (ref 25–34)
MCHC RBC AUTO-ENTMCNC: 32.6 G/DL (ref 32–36)
MCV RBC AUTO: 91.6 FL (ref 80–100)
MONOCYTES NFR BLD: 7.5 %
NEUTROPHILS # BLD AUTO: 7.7 %
NEUTROPHILS NFR BLD AUTO: 36.6 %
PMV BLD AUTO: 10.5 FL (ref 7.4–10.4)
POTASSIUM SERPL-SCNC: (no result) MMOL/L (ref 3.5–5.1)
RBC # BLD AUTO: 4.19 M/UL (ref 4.7–6.1)
SODIUM SERPL-SCNC: 142 MMOL/L (ref 136–145)
WBC # BLD AUTO: 5.75 K/UL (ref 4.8–10.8)

## 2017-08-21 RX ADMIN — DULOXETINE SCH MG: 60 CAPSULE, DELAYED RELEASE PELLETS ORAL at 08:28

## 2017-08-21 RX ADMIN — PANTOPRAZOLE SCH MG: 40 TABLET, DELAYED RELEASE ORAL at 08:29

## 2017-08-21 RX ADMIN — ALUMINUM ZIRCONIUM TRICHLOROHYDREX GLY SCH EA: 0.2 STICK TOPICAL at 08:00

## 2017-08-21 RX ADMIN — GABAPENTIN SCH MG: 600 TABLET, FILM COATED ORAL at 13:50

## 2017-08-21 RX ADMIN — SERTRALINE HYDROCHLORIDE SCH MG: 50 TABLET, FILM COATED ORAL at 08:30

## 2017-08-21 RX ADMIN — NICOTINE SCH PATCH: 21 PATCH, EXTENDED RELEASE TRANSDERMAL at 08:31

## 2017-08-21 RX ADMIN — LIDOCAINE SCH PATCH: 50 PATCH CUTANEOUS at 08:24

## 2017-08-21 RX ADMIN — HEPARIN SODIUM SCH UNIT: 10000 INJECTION, SOLUTION INTRAVENOUS; SUBCUTANEOUS at 06:23

## 2017-08-21 RX ADMIN — GABAPENTIN SCH MG: 600 TABLET, FILM COATED ORAL at 08:29

## 2017-08-21 RX ADMIN — HYDROCORTISONE SCH MG: 10 TABLET ORAL at 08:27

## 2017-08-21 RX ADMIN — LEVOTHYROXINE SODIUM SCH MCG: 150 TABLET ORAL at 06:17

## 2017-08-21 RX ADMIN — Medication SCH MG: at 08:30

## 2017-08-21 RX ADMIN — OXYCODONE AND ACETAMINOPHEN PRN TAB: 10; 325 TABLET ORAL at 08:31

## 2017-08-21 NOTE — PROGRESS NOTE
Internal Med Progress Note


Date of Service:


Aug 21, 2017.


Provider Documentation:





SUBJECTIVE:





States having chronic back pain and Right Knee pain


Denies chest pain, SOB, dizziness


No other complaints 


Denies bowel/bladder incontinence, Weakness, numbness





OBJECTIVE:





Vital Signs-as noted below





Physical Exam:


Vitals signs as noted above 


General Appearance:Obese, no apparent distress


Head:  normocephalic, Atraumatic 


Eyes:  normal inspection, EOMI, PERRL


Neck:  supple, Trachea midline


Respiratory/Chest: Normal breath sounds, CTA


Cardiovascular: S1, S2, No murmur


Abdomen/GI:Soft, Non tender, Bowel sounds present


Extremities/Musculoskelatal:normal inspection, no edema 


Neurologic/Psych:AAOX3, grossly no focal neurological deficits 


Skin:  normal color, warm





Lab data as noted below.


ASSESSMENT & PLAN:





YI on CKD III


Baseline Cr:1.5


Cr:4.9>>.3.1>>> 1.3


S/P IV fluids


Monitor renal function


Hold ACEI, Avoid NSAIDs


Avoid nephro toxic agents


Renal USD: mild cortical atrophy without hydronephrosis








Hypotension: 


Likely secondary to dehydration 


Also a component of  adrenal Insufficiency


BP more stable


S/P IV fluids


Monitor  


Possible sleep apnea could be contributing 








Altered Mental Status:


Metabolic/Toxic Encephalopathy:


H/O Substance abuse (Admits to Snorting Ambien)


Also has renal insufficiency 


Tox screen negative


Neuro checks


Resolved








H/O Adrenal Insufficiency/Pan hypopituitarism:


s/p Pituitary Resection


Continue PO Cortef, Humatrope, levothyroxine, Testosterone








H/O seizures


Continue gabapentin 


no acute issues currently 








H/O depression/Anxiety:


Continue home meds  


 





Sleep Apnea:


Not using CPAP secondary to Intolerance








HTN:


Plan to resume HTN meds when able








Spinal Stenosis:


PT/OT


Will consider Orthopedics if necessary


Off Note: Patient reports he is planned for right knee surgery in few days and  


             he would like to defer any surgical procedure for spine if needed 

for now.   








DVT Px:


Heparin SQ





Code Status:


Full Code





Disposition:


Plan to discharge home today





Follow up with Primary Care  on 8/24/17 at 1:00pm


Seek immediate medical attention if your symptoms reoccur or worsen


Vital Signs:











  Date Time  Temp Pulse Resp B/P (MAP) Pulse Ox O2 Delivery O2 Flow Rate FiO2


 


8/21/17 07:36 36.6 59 18 130/86 (101) 96   


 


8/21/17 00:00     93 Room Air  


 


8/21/17 00:00 36.6 68 20 110/63 (79) 92 Room Air  


 


8/20/17 19:04  70  130/71 (90)    


 


8/20/17 16:00      Room Air  


 


8/20/17 15:05 36.9 70 20 87/57 (67) 95   








Lab Results:





Results Past 24 Hours








Test


  8/20/17


11:15 8/20/17


16:19 8/20/17


20:18 8/21/17


07:26 Range/Units


 


 


Bedside Glucose 123 119 119 81 70-99  mg/dl


 


Test


  8/21/17


07:38 8/21/17


08:31 


  


  Range/Units


 


 


White Blood Count 5.75    4.8-10.8  K/uL


 


Red Blood Count 4.19    4.7-6.1  M/uL


 


Hemoglobin 12.5    14.0-18.0  g/dL


 


Hematocrit 38.4    42-52  %


 


Mean Corpuscular Volume 91.6      fL


 


Mean Corpuscular Hemoglobin 29.8    25-34  pg


 


Mean Corpuscular Hemoglobin


Concent 32.6


  


  


  


  32-36  g/dl


 


 


Platelet Count 182    130-400  K/uL


 


Mean Platelet Volume 10.5    7.4-10.4  fL


 


Neutrophils (%) (Auto) 36.6     %


 


Lymphocytes (%) (Auto) 46.8     %


 


Monocytes (%) (Auto) 7.5     %


 


Eosinophils (%) (Auto) 7.7     %


 


Basophils (%) (Auto) 0.9     %


 


Neutrophils # (Auto) 2.11    1.4-6.5  K/uL


 


Lymphocytes # (Auto) 2.69    1.2-3.4  K/uL


 


Monocytes # (Auto) 0.43    0.11-0.59  K/uL


 


Eosinophils # (Auto) 0.44    0-0.5  K/uL


 


Basophils # (Auto) 0.05    0-0.2  K/uL


 


RDW Standard Deviation 59.0    36.4-46.3  fL


 


RDW Coefficient of Variation 17.8    11.5-14.5  %


 


Immature Granulocyte % (Auto) 0.5     %


 


Immature Granulocyte # (Auto) 0.03    0.00-0.02  K/uL


 


Sodium Level 142    136-145  mmol/L


 


Potassium Level     3.5-5.1  mmol/L


 


Chloride Level 108      mmol/L


 


Carbon Dioxide Level 29    21-32  mmol/L


 


Anion Gap 5.0    3-11  mmol/L


 


Blood Urea Nitrogen 35    7-18  mg/dl


 


Creatinine


  1.30


  


  


  


  0.60-1.40


mg/dl


 


Est Creatinine Clear Calc


Drug Dose 97.0


  


  


  


   ml/min


 


 


Estimated GFR (


American) 70.7


  


  


  


   


 


 


Estimated GFR (Non-


American 61.0


  


  


  


   


 


 


BUN/Creatinine Ratio 26.8    10-20  


 


Random Glucose 89    70-99  mg/dl


 


Calcium Level 8.3    8.5-10.1  mg/dl

## 2017-08-21 NOTE — PROGRESS NOTE
Post ICU Progress Note


Date & Time


Aug 21, 2017 at 12:06





Vital Signs





Vital Signs Past 12 Hours








  Date Time  Temp Pulse Resp B/P (MAP) Pulse Ox O2 Delivery O2 Flow Rate FiO2


 


8/21/17 10:34 36.6 59 18  96 Room Air  


 


8/21/17 08:00      Room Air  


 


8/21/17 07:36 36.6 59 18 130/86 (101) 96   











Notes


Mental Status:  alert / awake


Nausea / Vomiting:  adequately controlled


Pain:  improving with treatment


Airway Patency, RR, SpO2:  stable & adequate


BP & HR:  stable & adequate


Patient was admitted secondary to a GI and possible polysubstance abuse.  

Patient was evaluated today and reports feeling much better at this time.  He 

offers no complaints.  He is expected to be discharged today.





Consider outpatient follow up in 1 to 2 weeks with:  PCP





Repeat imaging needed:  None





Follow up cultures:  None





Reviewed progress notes, labs, and inpatient medication list





Continue current management





Additional recommendations:  None at this time.





Please feel free to reconsult as needed. Critical Care Team will sign off at 

this time.





Consults & Procedures


Consultants:


Hospitalist: Dr. Fairchild


Procedures:


Nil

## 2017-08-21 NOTE — DISCHARGE INSTRUCTIONS
Discharge Instructions


Date of Service


Aug 21, 2017.





Admission


Reason for Admission:  Acute Renal Failure, Hypotension





Discharge


Discharge Diagnosis / Problem:  YI, Toxic Encephalopathy





Discharge Goals


Goal(s):  Decrease discomfort, Improve function





Activity Recommendations


Activity Limitations:  resume your previous activity


Exercise/Sports Limitations:  as tolerated


Driving or Machine Use:  Do not drive





.





Instructions / Follow-Up


Instructions / Follow-Up


Follow up with Primary Care  on 8/24/17 at 1:00pm





Seek immediate medical attention if your symptoms reoccur or worsen





Current Hospital Diet


Patient's current hospital diet: Renal Diet





Discharge Diet


Recommended Diet:  AHA Diet (Heart Healthy), Renal Diet





Pending Studies


Studies pending at discharge:  no





Medical Emergencies








.


Who to Call and When:





Medical Emergencies:  If at any time you feel your situation is an emergency, 

please call 911 immediately.





.





Non-Emergent Contact


Non-Emergency issues call your:  Primary Care Provider


Call Non-Emergent contact if:  you have a fever, your pain is not controlled, 

your pain is worsening, your pain is unusual for you, you have any medication 

questions





.


.








"Provider Documentation" section prepared by Derrick Fairchild.








.





VTE Core Measure


Inpt VTE Proph given/why not?:  Unfractionated heparin SQ

## 2017-08-21 NOTE — DISCHARGE SUMMARY
Discharge Summary


Date of Service


Aug 21, 2017.





Discharge Summary


Admission Date:


Aug 19, 2017 at 17:54


Discharge Date:  Aug 21, 2017


Discharge Disposition:  Home


Principal Diagnosis:


YI, Toxic Encephalopathy


Procedures:


CT Lumbar Spine:


1. No acute lumbar spine fracture or subluxation.


2. Old L4 compression fracture. Mild T12 compression fracture which is


age-indeterminate but likely old.


3. Moderate to severe central canal stenosis at L3-L4 and L4-L5 which is


suboptimally assessed by CT. Moderate to severe multilevel neural foraminal


stenosis most pronounced at the L3-L4, L4-L5 and L5-S1 levels. 





CT ABD:


1. No urinary calculi or hydronephrosis.


2. Mild gallbladder distention with possible layering material within the


gallbladder. No significant pericholecystic infiltration.  A biliary ultrasound


could be obtained if right upper quadrant pain.


3. Fatty liver.


4. No bowel obstruction. Normal appendix.





Renal USD:


1. The kidneys demonstrate mild cortical atrophy and are without hydronephrosis.


2. The bladder is normal as visualized.


3. Hepatic steatosis.


Consultations:


Intensivist


Pending Studies/Follow-Up:


Follow up with Primary Care  on 8/24/17 at 1:00pm





Seek immediate medical attention if your symptoms reoccur or worsen





Medication Reconciliation


Continued Medications:  


Atenolol (Atenolol) 50 Mg Tab


50 MG PO DAILY





Calcium Carbonate (Tums) 500 Mg Chew


2-3 TABS PO PRN





Duloxetine HCl (Duloxetine HCl) 60 Mg Cap


120 MG PO QAM for 30 Days, #60 CAP 3 Refills





Folic Acid (Folic Acid) 1 Mg Tab


1 MG PO QAM for 30 Days, #30 TAB 4 Refills





Gabapentin (Neurontin) 600 Mg Tab


1 TAB PO TID for 30 Days, #90 TAB 3 Refills





Hydrocortisone (Cortef) 10 Mg Tab


10 MG PO QPM





Hydrocortisone (Cortef) 10 Mg Tab


20 MG PO QAM





Levothyroxine Sodium (Synthroid) 300 Mcg Tab


300 MCG PO DAILY, #30





Lisinopril (Lisinopril) 20 Mg Tab


20 MG PO QAM for 30 Days, #30 TAB 3 Refills





Lorazepam (Lorazepam) 1 Mg Tab


0.5 MG PO TID, #1 (This prescription has been renewed)





Omeprazole (Prilosec) 40 Mg Cap


40 MG PO DAILY





Oxycodone/Acetaminophen 10MG/325MG (Percocet 10MG/325MG)  Tab


1 TAB PO Q12 PRN for Pain, TAB





Sertraline HCl (Sertraline HCl) 50 Mg Tab


50 MG PO BID, #60





Somatropin (Humatrope) 6 Mg Inj


1 EA SQ DAILY





Sumatriptan Succinate (Imitrex) 50 Mg Tab


1 TAB PO UD PRN for Migraine for 30 Days, #9 TAB 1 Refill


IMITREX 50MG ONE TAB DAILY PRN MIGRAINE HEADACHE


Testosterone (Testosterone) 1 % Gel


1 APPLN TOP UD, #150





Thiamine HCl (Vitamin B-1) 100 Mg Tab


100 MG PO QAM for 30 Days, #30 TAB 4 Refills





Zolpidem Tartrate (Zolpidem Tartrate) 10 Mg Tab


10 MG PO HS, #30





 


Discontinued Medications:  


Acetaminophen (Tylenol) 500 Mg Tab


1000 MG PO TID PRN for Pain, TAB











Admission Information


HPI (per Admitting provider):


Patient is a 57 Yr male with PMH of Adrenal Insufficiency, Panhypopituitary, 

Seizure disorder, Depression, Substance abuse disorder, CKD III, Spinal stenosis

, HTN, Anxiety disorder Prior history of alcohol abuse disorder and other 

problems presents with history of chronic lower back pain which has been 

worsening especially since last 2 weeks. Also reports chronic right knee pain 

and is scheduled for surgery next month for replacement. History is not 

reliable as patient is lethargic while in ED. He states back pain is non 

radiating and has been taking 8 Extra strength Tylenol and 1-2 Percocet per 

days since at least last 2 weeks for pain control. Also has been following with 

a psychiatrist for anxiety and depression and admits to snorting his 

prescription medications. Wife informed that he has been driving without 

License to  his prescriptions and unsure where he acquired the Percocet 

from. Wife also reports he has been very drowsy lately. Denies any history of 

chest pain, SOB, fever, chills, abdominal pain, nausea, vomiting, dizziness, 

history of fall, LOC, bowel/bladder Incontinence, numbness, speech problems, 

facial deformity. Denies using any illegal meds or Alcohol use lately. States 

he has some leg weakness and has been using cane to ambulate.


Physical Exam (per Admitting):  


   General Appearance:  no apparent distress, + obese, + pertinent finding (

Lethargic)


   Head:  normocephalic, atraumatic


   Eyes:  normal inspection, PERRL, EOMI


   ENT:  normal ENT inspection, hearing grossly normal


   Neck:  supple, trachea midline


   Respiratory/Chest:  chest non-tender, lungs clear, normal breath sounds, no 

respiratory distress


   Cardiovascular:  regular rate, rhythm, no edema, no murmur


   Abdomen/GI:  normal bowel sounds, non tender, soft


   Back:  normal inspection


   Extremities/Musculoskelatal:  normal inspection, no pedal edema


   Neurologic/Psych:  CNs II-XII nml as tested, no motor/sensory deficits, 

oriented x 3, + pertinent finding (Drowsy)


   Skin:  normal color, warm/dry





Hospital Course





YI on CKD III


Baseline Cr:1.5


Cr:4.9>>.3.1>>> 1.3


S/P IV fluids


Monitor renal function


Hold ACEI, Avoid NSAIDs


Avoid nephro toxic agents


Renal USD: mild cortical atrophy without hydronephrosis








Hypotension: 


Likely secondary to dehydration 


Also a component of  adrenal Insufficiency


BP more stable


S/P IV fluids


Monitor  


Possible sleep apnea could be contributing 








Altered Mental Status:


Metabolic/Toxic Encephalopathy:


[facility]


Also has renal insufficiency 


Tox screen negative


Neuro checks


Resolved








H/O Adrenal Insufficiency/Pan hypopituitarism:


s/p Pituitary Resection


Continue PO Cortef, Humatrope, levothyroxine, Testosterone








H/O seizures


Continue gabapentin 


no acute issues currently 








H/O depression/Anxiety:


Continue home meds  


 





Sleep Apnea:


Not using CPAP secondary to Intolerance








HTN:


Plan to resume HTN meds when able








Spinal Stenosis:


PT/OT


Will consider Orthopedics if necessary


Off Note: Patient reports he is planned for right knee surgery in few days and  


             he would like to defer any surgical procedure for spine if needed 

for now.   








DVT Px:


Heparin SQ





Code Status:


Full Code





Disposition:


Plan to discharge home today





Follow up with Primary Care  on 8/24/17 at 1:00pm


Seek immediate medical attention if your symptoms reoccur or worsen


Total time spent on discharge = 36 minutes


This includes examination of the patient, discharge planning, medication 

reconciliation, and communication with other providers.





Discharge Instructions


Discharge Instructions


Date of Service


Aug 21, 2017.





Admission


Reason for Admission:  Acute Renal Failure, Hypotension





Discharge


Discharge Diagnosis / Problem:  YI, Toxic Encephalopathy





Discharge Goals


Goal(s):  Decrease discomfort, Improve function





Activity Recommendations


Activity Limitations:  resume your previous activity


Exercise/Sports Limitations:  as tolerated


Driving or Machine Use:  Do not drive





.





Instructions / Follow-Up


Instructions / Follow-Up


Follow up with Primary Care  on 8/24/17 at 1:00pm





Seek immediate medical attention if your symptoms reoccur or worsen





Current Hospital Diet


Patient's current hospital diet: Renal Diet





Discharge Diet


Recommended Diet:  AHA Diet (Heart Healthy), Renal Diet





Pending Studies


Studies pending at discharge:  no





Medical Emergencies








.


Who to Call and When:





Medical Emergencies:  If at any time you feel your situation is an emergency, 

please call 911 immediately.





.





Non-Emergent Contact


Non-Emergency issues call your:  Primary Care Provider


Call Non-Emergent contact if:  you have a fever, your pain is not controlled, 

your pain is worsening, your pain is unusual for you, you have any medication 

questions





.


.








"Provider Documentation" section prepared by Derrick Fairchild.








.





VTE Core Measure


Inpt VTE Proph given/why not?:  Unfractionated heparin SQ

## 2017-08-22 ENCOUNTER — HOSPITAL ENCOUNTER (EMERGENCY)
Dept: HOSPITAL 45 - C.EDB | Age: 57
Discharge: HOME | End: 2017-08-22
Payer: COMMERCIAL

## 2017-08-22 VITALS
HEIGHT: 70 IN | BODY MASS INDEX: 45.1 KG/M2 | HEIGHT: 70 IN | WEIGHT: 315 LBS | WEIGHT: 315 LBS | BODY MASS INDEX: 45.1 KG/M2

## 2017-08-22 VITALS — DIASTOLIC BLOOD PRESSURE: 90 MMHG | OXYGEN SATURATION: 93 % | HEART RATE: 77 BPM | SYSTOLIC BLOOD PRESSURE: 134 MMHG

## 2017-08-22 VITALS — TEMPERATURE: 97.88 F

## 2017-08-22 VITALS — OXYGEN SATURATION: 95 %

## 2017-08-22 DIAGNOSIS — N18.3: ICD-10-CM

## 2017-08-22 DIAGNOSIS — Z86.39: ICD-10-CM

## 2017-08-22 DIAGNOSIS — M19.90: ICD-10-CM

## 2017-08-22 DIAGNOSIS — G40.909: ICD-10-CM

## 2017-08-22 DIAGNOSIS — K21.9: ICD-10-CM

## 2017-08-22 DIAGNOSIS — E23.0: ICD-10-CM

## 2017-08-22 DIAGNOSIS — E78.5: ICD-10-CM

## 2017-08-22 DIAGNOSIS — F32.9: ICD-10-CM

## 2017-08-22 DIAGNOSIS — Z82.49: ICD-10-CM

## 2017-08-22 DIAGNOSIS — E03.9: ICD-10-CM

## 2017-08-22 DIAGNOSIS — I44.0: ICD-10-CM
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
## 2017-08-22 DIAGNOSIS — M25.569: ICD-10-CM

## 2017-08-22 DIAGNOSIS — T88.7XXA: Primary | ICD-10-CM

## 2017-08-22 DIAGNOSIS — E66.01: ICD-10-CM

## 2017-08-22 DIAGNOSIS — F41.9: ICD-10-CM

## 2017-08-22 DIAGNOSIS — I12.9: ICD-10-CM

## 2017-08-22 LAB
ALBUMIN/GLOB SERPL: 0.8 {RATIO} (ref 0.9–2)
ALP SERPL-CCNC: 65 U/L (ref 45–117)
ALT SERPL-CCNC: 94 U/L (ref 12–78)
ANION GAP SERPL CALC-SCNC: 8 MMOL/L (ref 3–11)
APAP SERPL-MCNC: 9 UG/ML (ref 10–30)
APPEARANCE UR: CLEAR
AST SERPL-CCNC: 120 U/L (ref 15–37)
BASE EXCESS STD BLDV CALC-SCNC: 2.5 MEQ/L
BASOPHILS # BLD: 0.04 K/UL (ref 0–0.2)
BASOPHILS NFR BLD: 0.5 %
BENZODIAZ UR-MCNC: (no result) UG/L
BILIRUB UR-MCNC: (no result) MG/DL
BUN SERPL-MCNC: 24 MG/DL (ref 7–18)
BUN/CREAT SERPL: 17 (ref 10–20)
CALCIUM SERPL-MCNC: 8.5 MG/DL (ref 8.5–10.1)
CHLORIDE SERPL-SCNC: 106 MMOL/L (ref 98–107)
CO2 SERPL-SCNC: 29 MMOL/L (ref 21–32)
COLOR UR: YELLOW
COMPLETE: YES
CREAT CL PREDICTED SERPL C-G-VRATE: 87.5 ML/MIN
CREAT SERPL-MCNC: 1.4 MG/DL (ref 0.6–1.4)
EOSINOPHIL NFR BLD AUTO: 206 K/UL (ref 130–400)
GLOBULIN SER-MCNC: 4 GM/DL (ref 2.5–4)
GLUCOSE SERPL-MCNC: 101 MG/DL (ref 70–99)
HCT VFR BLD CALC: 40.2 % (ref 42–52)
IG%: 1.3 %
IMM GRANULOCYTES NFR BLD AUTO: 28.4 %
LYMPHOCYTES # BLD: 2.42 K/UL (ref 1.2–3.4)
MANUAL MICROSCOPIC REQUIRED?: NO
MCH RBC QN AUTO: 30.5 PG (ref 25–34)
MCHC RBC AUTO-ENTMCNC: 33.3 G/DL (ref 32–36)
MCV RBC AUTO: 91.6 FL (ref 80–100)
MONOCYTES NFR BLD: 6.1 %
NEUTROPHILS # BLD AUTO: 3.4 %
NEUTROPHILS NFR BLD AUTO: 60.3 %
NITRITE UR QL STRIP: (no result)
PCO2 BLDV: 63 MMHG (ref 38–50)
PCP UR-MCNC: (no result) UG/L
PH UR STRIP: 5.5 [PH] (ref 4.5–7.5)
PMV BLD AUTO: 9.9 FL (ref 7.4–10.4)
PO2 BLDV: 35 MMHG
POTASSIUM SERPL-SCNC: 3.3 MMOL/L (ref 3.5–5.1)
RBC # BLD AUTO: 4.39 M/UL (ref 4.7–6.1)
REVIEW REQ?: NO
SAO2 % BLDV FROM PO2: 63.5 %
SODIUM SERPL-SCNC: 143 MMOL/L (ref 136–145)
SP GR UR STRIP: 1.02 (ref 1–1.03)
TSH SERPL-ACNC: 0.09 UIU/ML (ref 0.3–4.5)
URINE BILL WITH OR WITHOUT MIC: (no result)
UROBILINOGEN UR-MCNC: (no result) MG/DL
WBC # BLD AUTO: 8.53 K/UL (ref 4.8–10.8)
ZZUR CULT IF INDIC CLEAN CATCH: NO
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
## 2017-08-28 LAB
COD UR: NEGATIVE NG/ML
HYDROCOD UR: NEGATIVE NG/ML
HYDROMOR UR: NEGATIVE NG/ML
MORPHINE UR CFM-MCNC: NEGATIVE NG/ML
NORHYDROCODONE CONF UR: NEGATIVE NG/ML
OXYMORPH UR: 870 NG/ML
SYNTHETIC CANNABINOIDS QL URIN: NEGATIVE

## 2017-09-18 ENCOUNTER — HOSPITAL ENCOUNTER (OUTPATIENT)
Dept: HOSPITAL 45 - C.RDSM | Age: 57
Discharge: HOME | End: 2017-09-18
Attending: ORTHOPAEDIC SURGERY
Payer: COMMERCIAL

## 2017-09-18 DIAGNOSIS — M17.11: Primary | ICD-10-CM

## 2018-02-16 LAB
BASOPHILS # BLD: 0.02 K/UL (ref 0–0.2)
BASOPHILS NFR BLD: 0.3 %
BUN SERPL-MCNC: 15 MG/DL (ref 7–18)
CALCIUM SERPL-MCNC: 9.7 MG/DL (ref 8.5–10.1)
CO2 SERPL-SCNC: 26 MMOL/L (ref 21–32)
CREAT SERPL-MCNC: 0.95 MG/DL (ref 0.6–1.4)
EOS ABS #: 0.13 K/UL (ref 0–0.5)
EOSINOPHIL NFR BLD AUTO: 263 K/UL (ref 130–400)
GLUCOSE SERPL-MCNC: 106 MG/DL (ref 70–99)
HCT VFR BLD CALC: 44.1 % (ref 42–52)
HGB BLD-MCNC: 14.9 G/DL (ref 14–18)
IG#: 0.01 K/UL (ref 0–0.02)
IMM GRANULOCYTES NFR BLD AUTO: 34.1 %
INR PPP: 1 (ref 0.9–1.1)
LYMPHOCYTES # BLD: 2.44 K/UL (ref 1.2–3.4)
MCH RBC QN AUTO: 31.3 PG (ref 25–34)
MCHC RBC AUTO-ENTMCNC: 33.8 G/DL (ref 32–36)
MCV RBC AUTO: 92.6 FL (ref 80–100)
MONO ABS #: 0.47 K/UL (ref 0.11–0.59)
MONOCYTES NFR BLD: 6.6 %
NEUT ABS #: 4.08 K/UL (ref 1.4–6.5)
NEUTROPHILS # BLD AUTO: 1.8 %
NEUTROPHILS NFR BLD AUTO: 57.1 %
PMV BLD AUTO: 11 FL (ref 7.4–10.4)
POTASSIUM SERPL-SCNC: 4 MMOL/L (ref 3.5–5.1)
PTT PATIENT: 26.9 SECONDS (ref 21–31)
RED CELL DISTRIBUTION WIDTH CV: 12.9 % (ref 11.5–14.5)
RED CELL DISTRIBUTION WIDTH SD: 43.7 FL (ref 36.4–46.3)
SODIUM SERPL-SCNC: 137 MMOL/L (ref 136–145)
WBC # BLD AUTO: 7.15 K/UL (ref 4.8–10.8)

## 2018-02-16 NOTE — PAT MEDICATION INSTRUCTIONS
Service Date


Feb 16, 2018.





Current Home Medication List


Acetaminophen (Tylenol), 2,000 MG PO PRN


Atenolol (Atenolol), 50 MG PO QAM


Calcium Carbonate (Tums), 2-3 TABS PO PRN


Diphenhydramine Hcl (Benadryl Allergy), 50 MG PO PRN


Escitalopram Oxalate (Lexapro), 30 MG PO QAM


Folic Acid (Folvite), 1 MG PO QAM


Gabapentin (Neurontin), 1 TAB PO TID PRN for PRN


Hydrocortisone (Cortef), 10 MG PO QPM


Hydrocortisone (Cortef), 20 MG PO QAM


Levothyroxine Sodium (Synthroid), 300 MCG PO QAM


Lisinopril (Zestril), 20 MG PO QAM


Lorazepam (Ativan), 1 MG PO TID


Sertraline HCl (Sertraline HCl), 100 MG PO QAM


Somatropin (Humatrope), 1 EA SQ DAILY


Sumatriptan Succinate (Imitrex), 50 MG PO PRN PRN for UD


Testosterone (Testosterone), 1 APPLN TOP UD


Thiamine Hcl (Vitamin B-1), 100 MG PO QAM


Zolpidem Tartrate (Zolpidem Tartrate), 10 MG PO HS


[Lasix], 1 TAB PO PRN





Medication Instructions


For Your Scheduled Surgery 





-Check with your prescriber for instructions for:


Somatropin (Humatrope), 1 EA SQ DAILY








- Hold the following medications 24 hours prior to surgery:


Testosterone (Testosterone), 1 APPLN TOP UD








- Hold the following medications the morning of surgery:


Calcium Carbonate (Tums), 2-3 TABS PO PRN


Diphenhydramine Hcl (Benadryl Allergy), 50 MG PO PRN


Folic Acid (Folvite), 1 MG PO QAM


Lisinopril (Zestril), 20 MG PO QAM


Thiamine Hcl (Vitamin B-1), 100 MG PO QAM


[Lasix], 1 TAB PO PRN








- Take the following medications the morning of surgery with a sip of water:


Acetaminophen (Tylenol), 2,000 MG PO PRN (if needed)


Atenolol (Atenolol), 50 MG PO QAM


Escitalopram Oxalate (Lexapro), 30 MG PO QAM


Gabapentin (Neurontin), 1 TAB PO TID PRN for PRN (if needed)


Levothyroxine Sodium (Synthroid), 300 MCG PO QAM


Lorazepam (Ativan), 1 MG PO TID


Sertraline HCl (Sertraline HCl), 100 MG PO QAM


Sumatriptan Succinate (Imitrex), 50 MG PO PRN PRN for UD (if needed)








- Take the following medications as scheduled the night before surgery:


Calcium Carbonate (Tums), 2-3 TABS PO PRN (if needed)


Diphenhydramine Hcl (Benadryl Allergy), 50 MG PO PRN (if needed)


Gabapentin (Neurontin), 1 TAB PO TID PRN for PRN (if needed)


Hydrocortisone (Cortef), 10 MG PO QPM


Lorazepam (Ativan), 1 MG PO TID


Sumatriptan Succinate (Imitrex), 50 MG PO PRN PRN for UD (if needed)


[Lasix], 1 TAB PO PRN (if needed)


Zolpidem Tartrate (Zolpidem Tartrate), 10 MG PO HS








If you have any questions please call us at [phone] or [phone] or 

[phone]

## 2018-02-23 NOTE — HISTORY & PHYSICAL EXAMINATION
DATE OF ADMISSION:  03/14/2018

 

PREOPERATIVE HISTORY AND PHYSICAL

 

PATIENT OF:  Dr. Bowen.

 

CHIEF COMPLAINT:  Right knee pain.

 

HISTORY OF PRESENT ILLNESS:  This 58-year-old white male presents for his

preoperative history and physical.  He is scheduled to undergo a right knee

total knee arthroplasty on 03/14/2018.  He has a longstanding history of

right knee pain.  Symptoms have been ongoing for over 3 years.  He had an

acute onset of pain in early January 2017, after slipping on some ice and

twisting.  He previously had right knee pain that was responsive to

viscosupplementation injections.  Over the last year, he has tried several

additional injections as well as cortisone injections without improvement. 

He states he cannot walk 10 feet without having pain.  He states the only

thing that improves his pain is narcotic medication.  He has tried

anti-inflammatories without improvement.  He has also tried tramadol.  He

states he cannot take NSAIDs due to previous renal damage from taking too

many.  He states Tylenol does not help.  Pain is affecting his ADLs.  It is

worse with weightbearing.  He elects to proceed with surgical intervention in

hopes of alleviating his pain.  Preoperative imaging has been obtained.

 

PAST MEDICAL HISTORY:  Significant for chronic back pain, hypertension,

hypothyroidism, osteoarthritis, sleep apnea, elevated cholesterol, history of

kidney failure, and history of narcotic abuse.  He has had several admissions

due to abuse.

 

PAST SURGICAL HISTORY:  Left quadriceps tendon repair, right Achilles tendon

repair.

 

FAMILY HISTORY:  Significant for cancer and heart disease.

 

SOCIAL HISTORY:  The patient is .  No tobacco use, no ETOH use. 

History of narcotic abuse.  No cigarette use.  Positive spit tobacco use.

 

ALLERGIES:  KNOWN ALLERGY TO ADVIL; he states there is no allergic reaction,

but he is not supposed to use it secondary to renal disease.

 

CURRENT MEDICATIONS:  Ambien 10 mg p.o. at bedtime p.r.n., atenolol 50 mg

p.o. daily, benazepril 25 mg p.o. daily, Cortef 10 mg p.o.  q.p.m. and 20 mg

p.o. q.a.m., Lexapro 20 mg 1/2 tablet p.o. daily, lorazepam 1 mg p.o. t.i.d.,

sertraline 50 mg 2 tablets p.o. daily, Synthroid 300 mcg p.o. daily, Tylenol

500 mg p.o. p.r.n., and testosterone 1 mg weekly.

 

REVIEW OF SYSTEMS:  Significant for above-stated conditions, otherwise

unremarkable.

 

PHYSICAL EXAMINATION:

GENERAL:  Well-developed, well-nourished, large individual male in no acute

distress.  Sitting in a chair.  Alert and oriented.

VITAL SIGNS:  Height 5 feet 10 inches, weight 350 pounds.

SKIN:  Warm and dry with good turgor.  No rashes or lesions.  No ecchymosis

or erythema.

HEENT:  Normocephalic, atraumatic.  Eyes - PERRLA, EOMI.  Nares patent

bilaterally without turbinate enlargement.  Oropharynx without erythema or

exudate.  No lesions noted.  Uvula midline.  Oral mucosa moist.  Fair

dentition.  Fillings are noted.

HEART:  RRR.  No MGR.

LUNGS:  Clear to auscultation bilaterally.  No crackles, rhonchi or wheezing.

 Good air movement.

ABDOMEN:  Obese.  Bowel sounds present x4, soft, nontender.  No organomegaly.

MUSCULOSKELETAL:  Right knee has no obvious asymmetry or deformity.  He has a

varus alignment.  He lacks approximately 5 degrees of terminal extension. 

Flexion to greater than 100 degrees.  Strength is 5/5 with good quad tone. 

No defect in the patellar tendon or quadriceps tendon.  Stable collateral

ligaments.  He has focal discomfort with palpation over the medial joint line

and peripatellar area.  Ambulatory with an antalgic gait.

NEUROLOGIC:  Cranial nerves II through XII are intact.  Gross sensation is

intact across the right leg by soft touch.  Peripheral pulses are 2+.

 

DATA:  Radiographic imaging previously obtained shows significant medial

joint space narrowing, subchondral sclerosis, and subchondral cysts.  He also

has degenerative changes within the patellofemoral joint.  Periarticular

osteophytes are also present.

 

IMPRESSION:  Right knee endstage degenerative joint disease.

 

PLAN:  Postoperative prescriptions for Percocet and Coumadin will be provided

at discharge from the hospital.  He did request a necrotic medication at his

preop.  I would like him to speak with his PCP, Dr. Alfonso, for

appropriate preoperative medication management.  He may in fact be able to

use short term inflammatories but this will be determined by his

nephrologist.  Prescription was provided for a walker and he was instructed

to bring it to the hospital at the time of surgery.  Preoperative lab work,

EKG, and chest x-ray were ordered.  Medical clearance will be obtained from

his PCP.  Anticipate discharge to [facility] or skilled nursing facility as

he has numerous steps to navigate in his home.  He may also require pain

management from anesthesia given his previous narcotics abuse.

 

 

 

[place]

## 2018-03-14 ENCOUNTER — HOSPITAL ENCOUNTER (INPATIENT)
Dept: HOSPITAL 45 - C.ACU | Age: 58
LOS: 1 days | Discharge: HOME HEALTH | DRG: 470 | End: 2018-03-15
Attending: ORTHOPAEDIC SURGERY | Admitting: ORTHOPAEDIC SURGERY
Payer: COMMERCIAL

## 2018-03-14 VITALS
DIASTOLIC BLOOD PRESSURE: 81 MMHG | TEMPERATURE: 98.42 F | HEART RATE: 101 BPM | OXYGEN SATURATION: 94 % | SYSTOLIC BLOOD PRESSURE: 130 MMHG

## 2018-03-14 VITALS
OXYGEN SATURATION: 94 % | HEART RATE: 83 BPM | TEMPERATURE: 98.42 F | SYSTOLIC BLOOD PRESSURE: 126 MMHG | DIASTOLIC BLOOD PRESSURE: 70 MMHG

## 2018-03-14 VITALS
SYSTOLIC BLOOD PRESSURE: 121 MMHG | OXYGEN SATURATION: 96 % | HEART RATE: 80 BPM | TEMPERATURE: 98.42 F | DIASTOLIC BLOOD PRESSURE: 70 MMHG

## 2018-03-14 VITALS
TEMPERATURE: 97.52 F | SYSTOLIC BLOOD PRESSURE: 131 MMHG | DIASTOLIC BLOOD PRESSURE: 75 MMHG | OXYGEN SATURATION: 96 % | HEART RATE: 70 BPM

## 2018-03-14 VITALS
SYSTOLIC BLOOD PRESSURE: 122 MMHG | OXYGEN SATURATION: 96 % | HEART RATE: 66 BPM | TEMPERATURE: 97.7 F | DIASTOLIC BLOOD PRESSURE: 77 MMHG

## 2018-03-14 VITALS
OXYGEN SATURATION: 95 % | TEMPERATURE: 97.7 F | SYSTOLIC BLOOD PRESSURE: 132 MMHG | DIASTOLIC BLOOD PRESSURE: 77 MMHG | HEART RATE: 58 BPM

## 2018-03-14 VITALS
BODY MASS INDEX: 45.1 KG/M2 | HEIGHT: 70 IN | HEIGHT: 70 IN | WEIGHT: 315 LBS | WEIGHT: 315 LBS | BODY MASS INDEX: 45.1 KG/M2

## 2018-03-14 VITALS
TEMPERATURE: 99.5 F | OXYGEN SATURATION: 92 % | SYSTOLIC BLOOD PRESSURE: 112 MMHG | DIASTOLIC BLOOD PRESSURE: 71 MMHG | HEART RATE: 102 BPM

## 2018-03-14 VITALS
OXYGEN SATURATION: 97 % | TEMPERATURE: 98.06 F | HEART RATE: 62 BPM | DIASTOLIC BLOOD PRESSURE: 79 MMHG | SYSTOLIC BLOOD PRESSURE: 128 MMHG

## 2018-03-14 VITALS
HEART RATE: 87 BPM | OXYGEN SATURATION: 92 % | SYSTOLIC BLOOD PRESSURE: 119 MMHG | DIASTOLIC BLOOD PRESSURE: 65 MMHG | TEMPERATURE: 98.6 F

## 2018-03-14 DIAGNOSIS — F41.9: ICD-10-CM

## 2018-03-14 DIAGNOSIS — F32.9: ICD-10-CM

## 2018-03-14 DIAGNOSIS — M17.11: Primary | ICD-10-CM

## 2018-03-14 DIAGNOSIS — E66.01: ICD-10-CM

## 2018-03-14 DIAGNOSIS — I12.9: ICD-10-CM

## 2018-03-14 DIAGNOSIS — G89.29: ICD-10-CM

## 2018-03-14 DIAGNOSIS — Z72.0: ICD-10-CM

## 2018-03-14 DIAGNOSIS — Z88.8: ICD-10-CM

## 2018-03-14 DIAGNOSIS — Z79.52: ICD-10-CM

## 2018-03-14 DIAGNOSIS — G62.9: ICD-10-CM

## 2018-03-14 DIAGNOSIS — G43.909: ICD-10-CM

## 2018-03-14 DIAGNOSIS — G47.30: ICD-10-CM

## 2018-03-14 DIAGNOSIS — Z79.899: ICD-10-CM

## 2018-03-14 DIAGNOSIS — E89.3: ICD-10-CM

## 2018-03-14 DIAGNOSIS — M21.161: ICD-10-CM

## 2018-03-14 DIAGNOSIS — N18.3: ICD-10-CM

## 2018-03-14 DIAGNOSIS — Z79.891: ICD-10-CM

## 2018-03-14 DIAGNOSIS — F11.11: ICD-10-CM

## 2018-03-14 DIAGNOSIS — Z91.030: ICD-10-CM

## 2018-03-14 DIAGNOSIS — E03.9: ICD-10-CM

## 2018-03-14 PROCEDURE — 0SRC0J9 REPLACEMENT OF RIGHT KNEE JOINT WITH SYNTHETIC SUBSTITUTE, CEMENTED, OPEN APPROACH: ICD-10-PCS | Performed by: ORTHOPAEDIC SURGERY

## 2018-03-14 RX ADMIN — CEFAZOLIN SCH MLS/MIN: 10 INJECTION, POWDER, FOR SOLUTION INTRAVENOUS at 15:16

## 2018-03-14 RX ADMIN — FERROUS GLUCONATE SCH MG: 324 TABLET ORAL at 18:01

## 2018-03-14 RX ADMIN — OXYCODONE HYDROCHLORIDE PRN MG: 5 TABLET ORAL at 10:53

## 2018-03-14 RX ADMIN — MORPHINE SULFATE PRN MG: 2 INJECTION, SOLUTION INTRAMUSCULAR; INTRAVENOUS at 11:54

## 2018-03-14 RX ADMIN — OXYCODONE HYDROCHLORIDE PRN MG: 5 TABLET ORAL at 19:14

## 2018-03-14 RX ADMIN — LORAZEPAM SCH MG: 1 TABLET ORAL at 20:46

## 2018-03-14 RX ADMIN — ACETAMINOPHEN SCH MG: 500 TABLET, COATED ORAL at 20:47

## 2018-03-14 RX ADMIN — ALUMINUM ZIRCONIUM TRICHLOROHYDREX GLY SCH EA: 0.2 STICK TOPICAL at 15:14

## 2018-03-14 RX ADMIN — CEFAZOLIN SCH MLS/MIN: 10 INJECTION, POWDER, FOR SOLUTION INTRAVENOUS at 23:39

## 2018-03-14 RX ADMIN — ACETAMINOPHEN SCH MG: 500 TABLET, COATED ORAL at 12:13

## 2018-03-14 RX ADMIN — MORPHINE SULFATE PRN MG: 2 INJECTION, SOLUTION INTRAMUSCULAR; INTRAVENOUS at 20:12

## 2018-03-14 RX ADMIN — DOCUSATE SODIUM SCH MG: 100 CAPSULE, LIQUID FILLED ORAL at 20:46

## 2018-03-14 RX ADMIN — FERROUS GLUCONATE SCH MG: 324 TABLET ORAL at 12:13

## 2018-03-14 RX ADMIN — LORAZEPAM SCH MG: 1 TABLET ORAL at 14:08

## 2018-03-14 RX ADMIN — OXYCODONE HYDROCHLORIDE PRN MG: 5 TABLET ORAL at 15:11

## 2018-03-14 RX ADMIN — MORPHINE SULFATE PRN MG: 2 INJECTION, SOLUTION INTRAMUSCULAR; INTRAVENOUS at 16:06

## 2018-03-14 RX ADMIN — OXYCODONE HYDROCHLORIDE PRN MG: 5 TABLET ORAL at 23:40

## 2018-03-14 NOTE — ANESTHESIOLOGY PROGRESS NOTE
Anesthesia Post Op Note


Date & Time


Mar 14, 2018 at 09:35





Vital Signs


Pain Intensity:  0





Vital Signs Past 12 Hours








  Date Time  Temp Pulse Resp B/P (MAP) Pulse Ox O2 Delivery O2 Flow Rate FiO2


 


3/14/18 09:20 36.7 74 18 118/74 94 Nasal Cannula 2 


 


3/14/18 09:10  80 20 105/65 94 Nasal Cannula 2 


 


3/14/18 09:01 36.6 74 20 104/59 92 Nasal Cannula 2 


 


3/14/18 05:30 36.5 58 20 132/77 95 Room Air  











Notes


Mental Status:  alert / awake / arousable, participated in evaluation


Pt Amnestic to Procedure:  Yes


Nausea / Vomiting:  adequately controlled


Pain:  adequately controlled


Airway Patency, RR, SpO2:  stable & adequate


BP & HR:  stable & adequate


Hydration State:  stable & adequate


Neuraxial Anesthesia:  was administered, sensory block is resolving


Anesthetic Complications:  no major complications apparent


anesthetic level at L5

## 2018-03-14 NOTE — PROGRESS NOTE
DATE: 03/14/2018

 

Postop check.  Doing well.  Needs to be motivated to get up and move.  Vital

signs are stable.  Afebrile.  No chest pain, shortness of breath, fever,

chills, nausea, vomiting or headache.

 

X-rays postop look excellent.

 

ASSESSMENT:  Doing well.  Needs to get up out of bed with a knee immobilizer.

 Weightbearing to tolerance.  Range of motion 0-60 only secondary to quad

snip.  Required to dislocate his patella to do the procedure.  Will be

weightbearing to tolerance with knee immobilizer on.  Pain management is

acceptable.

## 2018-03-14 NOTE — OPERATIVE REPORT
DATE OF OPERATION:  03/14/2018

 

SURGEON:  Dr. Bowen.

 

ASSISTANT:  Sreekanth.

 

SECOND ASSISTANT:  Homar Allen PA-C.

 

PREOPERATIVE DIAGNOSIS:  Osteoarthritis with severe deformity, morbid obese,

right knee.

 

POSTOPERATIVE DIAGNOSIS:  Same.

 

OPERATION PERFORMED:  Cemented right total knee replacement, correction of

deformity.

 

PERIOPERATIVE SITUATION:  Medically cleared male with intractable knee pain,

has marked varus flexion deformity.  X-rays reveal end-stage tricompartmental

disease.  At this point in time, he is cleared for surgery.

 

High risk based on his body type and comorbidities.

 

OPERATION:  The patient appropriately identified, site verified, consent

verified.  The right lower extremity was prepped and draped in usual routine

fashion.  Confirmed that consent was there, confirmed right side, confirmed

that antibiotic 3 grams of Ancef given.  Tourniquet time was approximately 70

minutes.  Midline exposure approach.  Tourniquet was inflated after

exsanguination of limb with a rubber Esmarch bandage.  Pressure was about

300.  Midline exposure utilized.  There was extremely tight extensor

mechanism, this was released as much as possible distally along the lateral

retinacular areas and then ultimately needed a quad snip to get the patella

to move.  All the osteophytes were then resected along the margin of the

femur, margin of the tibia and margin of the patella.  Distal femur was then

resected 14 mm, proximal tibia was then resected 4 mm.  It was sized to a 5

on the femur and the tibia.  The cutting block applied and the anterior and

posterior condylar and chamfer cuts made.  The flexion gap was then checked,

it was still tight slightly medially and required posterior capsular release,

then it was excellent.

 

The femur was then box cut and the size 5 trial fit well.  The tibia was then

broached and reamed to a size 5.  Trial reduction with a 10 and a 12.5

spacer, noted that 12.5 took away a little bit of hyperextension, so that was

elected to be used.  Patella was sized to 38, it was resected leaving 14 mm

and then the seating holes made and the trial fit well.  The wound was then

irrigated with Betadine and Pulsavac, injected with Orthomix, and the

permanent cemented into position.  After 12 minutes, the tourniquet deflated.

 After 14 minutes, the knee was irrigated, flexed, the trial spacer removed. 

No major bleeding encountered.  Permanent spacer seated.  The knee reduced. 

The quad snip was repaired at length in extension and the knee flexed and the

rest of repair carried out.  This allowed watertight repair of the mechanism.

 It was repaired with #2 Vicryl interrupted figure-of-eight sutures.

 

Subcutaneous layer was closed with 2-0 Vicryl and the skin with stainless

steel clips.  Appropriate dressing applied.  Estimated blood loss was 100 mL.

 Crystalloid roughly  mL.

 

SUMMARY OF IMPLANTS:  Size 5 right femur posterior cruciate substituting,

size 5 rotating tibial platform tray, size 5 spacer 12.5 mm thick posterior

cruciate substituting, oval domed 3-pegged patella size 38 and 2 bags of

Palacos G cement.

 

Pathology pending on bone.  DVT prophylaxis with Coumadin.

 

 

I attest to the content of the Intraoperative Record and any orders documented therein. Any exception
s are noted below.

## 2018-03-14 NOTE — [facility] POST OPERATIVE BRIEF NOTE
Immediate Operative Summary


Operative Date


Mar 14, 2018.





Pre-Operative Diagnosis





Right Knee End-Stage Degenerative Joint Disease





Post-Operative Diagnosis





Right Knee End-Stage Degenerative Joint Disease





Procedure(s) Performed





Right Total Knee Arthroplasty





Surgeon


Dr. Bowen





Assistant Surgeon(s)


Dr. Stack (Fellow)/BRANDEE Diaz





Estimated Blood Loss


100 ml





Findings


Consistent with Post-Op Diagnosis





Fluids (cc crystalloids)


1200cc





Specimens





A. Right Knee Bone and Tissue





Drains


None





Anesthesia Type


MAC Spinal Regional





Complication(s)


none





Disposition


Accompanied Pt To Recover:  no


Disposition:  Recovery Room / PACU

## 2018-03-14 NOTE — [facility] OPERATIVE REPORT
Operative Report


Operative Date


Mar 14, 2018.





Pre-Operative Diagnosis





Right Knee End-Stage Degenerative Joint Disease





Post-Operative Diagnosis





Right Knee End-Stage Degenerative Joint Disease





Procedure(s) Performed





Right Total Knee Arthroplasty





Surgeon


Dr. Bowen





Assistant Surgeon(s)


Dr. Stack (Fellow)/BRANDEE Jordan





Estimated Blood Loss


100 ml





Findings


Right knee DJD





Fluids


1200cc





Specimens





A. Right Knee Bone and Tissue





Drains


None





Anesthesia Type


MAC Spinal Regional





Complication(s)


none





Disposition


no


Recovery Room / PACU





Indications


This 58-year-old white male presented to the office with complaints of 

intractable right knee pain.  He had tried conservative care measures including 

activity modification, oral pain medication, and use of an assistive device 

without lasting improvement.  He elected to proceed with surgical intervention 

after being educated about potential risks and outcomes.  Preoperative imaging 

was obtained.  He is unable to take anti-inflammatories secondary to previous 

renal failure.





Description of Procedure


Patient was administered a spinal anesthetic and then taken to the operating 

room where he was given sedation.  He was prepped and draped in usual sterile 

fashion.  Please see Dr. oBwen's operative report for specifics of the 

procedure.  I was present for the entire case from initial patient positioning 

through final wound closure.  Assistance was provided in tissue retraction, 

hemostasis, trial implant placement, final implant placement, and final wound 

closure.  Patient was taken to the recovery room in satisfactory condition.


I attest to the content of the Intraoperative Record and any orders documented 

therein.  Any exceptions are noted below.

## 2018-03-14 NOTE — DIAGNOSTIC IMAGING REPORT
R KNEE 1 OR 2 VIEWS ROUTINE



CLINICAL HISTORY: AP/LATERAL IN PACU RIGHT KNEE pain



COMPARISON: None.



DISCUSSION: Anatomic alignment post total right knee arthroplasty. Good contact

between prosthetic and underlying bone. Expected soft tissue post operative

change.    



IMPRESSION: Anatomic alignment status post total right knee arthroplasty.











The above report was generated using voice recognition software.  It may contain

grammatical, syntax or spelling errors.







Electronically signed by:  Russell Talavera M.D.

3/14/2018 9:42 AM



Dictated Date/Time:  3/14/2018 9:42 AM

## 2018-03-15 VITALS
TEMPERATURE: 99.32 F | DIASTOLIC BLOOD PRESSURE: 68 MMHG | HEART RATE: 67 BPM | SYSTOLIC BLOOD PRESSURE: 112 MMHG | OXYGEN SATURATION: 92 %

## 2018-03-15 VITALS
DIASTOLIC BLOOD PRESSURE: 74 MMHG | HEART RATE: 71 BPM | TEMPERATURE: 99.14 F | OXYGEN SATURATION: 95 % | SYSTOLIC BLOOD PRESSURE: 119 MMHG

## 2018-03-15 VITALS
TEMPERATURE: 98.96 F | OXYGEN SATURATION: 93 % | SYSTOLIC BLOOD PRESSURE: 124 MMHG | HEART RATE: 84 BPM | DIASTOLIC BLOOD PRESSURE: 77 MMHG

## 2018-03-15 VITALS
TEMPERATURE: 98.6 F | DIASTOLIC BLOOD PRESSURE: 71 MMHG | SYSTOLIC BLOOD PRESSURE: 115 MMHG | HEART RATE: 79 BPM | OXYGEN SATURATION: 94 %

## 2018-03-15 LAB
BUN SERPL-MCNC: 17 MG/DL (ref 7–18)
CALCIUM SERPL-MCNC: 8.8 MG/DL (ref 8.5–10.1)
CO2 SERPL-SCNC: 29 MMOL/L (ref 21–32)
CREAT SERPL-MCNC: 1.04 MG/DL (ref 0.6–1.4)
EOSINOPHIL NFR BLD AUTO: 188 K/UL (ref 130–400)
GLUCOSE SERPL-MCNC: 122 MG/DL (ref 70–99)
HCT VFR BLD CALC: 40.1 % (ref 42–52)
HGB BLD-MCNC: 13.4 G/DL (ref 14–18)
INR PPP: 1 (ref 0.9–1.1)
MCH RBC QN AUTO: 30.4 PG (ref 25–34)
MCHC RBC AUTO-ENTMCNC: 33.4 G/DL (ref 32–36)
MCV RBC AUTO: 90.9 FL (ref 80–100)
PMV BLD AUTO: 9.8 FL (ref 7.4–10.4)
POTASSIUM SERPL-SCNC: 3.7 MMOL/L (ref 3.5–5.1)
RED CELL DISTRIBUTION WIDTH CV: 13 % (ref 11.5–14.5)
RED CELL DISTRIBUTION WIDTH SD: 43 FL (ref 36.4–46.3)
SODIUM SERPL-SCNC: 136 MMOL/L (ref 136–145)
WBC # BLD AUTO: 9.19 K/UL (ref 4.8–10.8)

## 2018-03-15 RX ADMIN — FERROUS GLUCONATE SCH MG: 324 TABLET ORAL at 12:49

## 2018-03-15 RX ADMIN — MORPHINE SULFATE PRN MG: 2 INJECTION, SOLUTION INTRAMUSCULAR; INTRAVENOUS at 00:48

## 2018-03-15 RX ADMIN — ACETAMINOPHEN SCH MG: 500 TABLET, COATED ORAL at 14:24

## 2018-03-15 RX ADMIN — OXYCODONE HYDROCHLORIDE PRN MG: 5 TABLET ORAL at 03:43

## 2018-03-15 RX ADMIN — OXYCODONE HYDROCHLORIDE PRN MG: 5 TABLET ORAL at 08:01

## 2018-03-15 RX ADMIN — LORAZEPAM SCH MG: 1 TABLET ORAL at 14:23

## 2018-03-15 RX ADMIN — MORPHINE SULFATE PRN MG: 2 INJECTION, SOLUTION INTRAMUSCULAR; INTRAVENOUS at 15:08

## 2018-03-15 RX ADMIN — ALUMINUM ZIRCONIUM TRICHLOROHYDREX GLY SCH EA: 0.2 STICK TOPICAL at 08:00

## 2018-03-15 RX ADMIN — FERROUS GLUCONATE SCH MG: 324 TABLET ORAL at 08:53

## 2018-03-15 RX ADMIN — DOCUSATE SODIUM SCH MG: 100 CAPSULE, LIQUID FILLED ORAL at 08:55

## 2018-03-15 RX ADMIN — ALUMINUM ZIRCONIUM TRICHLOROHYDREX GLY SCH EA: 0.2 STICK TOPICAL at 16:00

## 2018-03-15 RX ADMIN — OXYCODONE HYDROCHLORIDE PRN MG: 5 TABLET ORAL at 12:50

## 2018-03-15 RX ADMIN — MORPHINE SULFATE PRN MG: 2 INJECTION, SOLUTION INTRAMUSCULAR; INTRAVENOUS at 10:21

## 2018-03-15 RX ADMIN — ALUMINUM ZIRCONIUM TRICHLOROHYDREX GLY SCH EA: 0.2 STICK TOPICAL at 00:00

## 2018-03-15 RX ADMIN — LORAZEPAM SCH MG: 1 TABLET ORAL at 08:53

## 2018-03-15 RX ADMIN — MORPHINE SULFATE PRN MG: 2 INJECTION, SOLUTION INTRAMUSCULAR; INTRAVENOUS at 04:49

## 2018-03-15 RX ADMIN — ACETAMINOPHEN SCH MG: 500 TABLET, COATED ORAL at 05:38

## 2018-03-15 NOTE — PROGRESS NOTE
DATE: 03/15/2018

 

SUBJECTIVE:  Postop day 1 status post right total knee replacement.

 

At this point in time, the patient is being a little bit resistant to getting

up and moving around.  He did state he walked around the room last night.  I

have encouraged him that this is very important and he needs to minimize his

risk for complications by getting up and moving.  Also told that he should be

able to leave this afternoon.

 

At this point in time, he denies chest pain, shortness of breath, fever,

chills, nausea, vomiting or headache.  He is requesting something more than

Percocet 10 for pain.

 

OBJECTIVE:  Vital signs are stable.  He is afebrile.

 

Hematocrit stable at 40.1.

 

INR is 1.0.

 

The electrolytes are good.

 

Calves nontender.  Abdomen soft, nontender.  Wound dressing clean, dry and

intact.

 

ASSESSMENT:  Overall, doing well.  At this point in time should be able to be

discharged this afternoon after PT, OT.  Range of motion knee is 0-60, wear

knee immobilizer for ambulation for 2 weeks secondary to the quad snip

required to release all of the scar around his patella.  Coumadin to keep INR

1.8-2.2.  Discharge on 6 mg a day based on his size.  Check INR on Monday.

 

 

 

 

MTDD

## 2018-03-15 NOTE — PAIN MANAGEMENT CONSULTATION
Pain Management Consultation


Date of Consultation


Mar 15, 2018.





Reason for Consultation


Right knee pain





Pain Location











1 - 








History


Mr. Luong is a 57 y/o white male that had a right total knee replaced by Dr. Bowen yesterday.  Patient does complain of pain in the right knee 

postoperatively.  Patient has been ambulating around the room with assistance 

since yesterday.  He is taking Oxycodone 10mg x 4 hours for pain which he 

states is minimally effective.  He does have Morphine 2mg IV x 1 hr PRN 

breakthrough pain which he states is providing mild relief.  Patient rates his 

pain 10/10 currently.  He does wish to go to a rehab facility on discharge but 

unsure if his health insurance will cover for it.  Patient denies any 

constitutional complaints.  





Case discussed with Dr. Liriano





Past Medical/Surgical History





(1) Pituitary adenoma


(2) Achilles tendon repair


(3) Panhypopituitarism


(4) Hyperlipidemia


(5) Intracranial bleed


(6) Benign hypertension


(7) Seizure disorder


(8) CKD (chronic kidney disease), stage III


(9) Depression with anxiety


(10) Pancreatitis


(11) Gout


(12) Hx of substance abuse


(13) GERD (gastroesophageal reflux disease)


(14) S/P tonsillectomy


(15) Status post transsphenoidal pituitary resection


(16) H/O knee surgery


(17) H/O esophagogastroduodenoscopy


(18) H/O colonoscopy





Family History





Cancer


FH: multiple myeloma


  FATHER


Hypertension


  FATHER





Social / Work History


Drug Use:  none


Marital Status:  


Housing Status:  lives with significant other





Allergies


Coded Allergies:  


     BEE STING (Verified  Allergy, Severe, anaphylaxis WITH MANY STINGS IN THE 

PAST, 3/14/18)


     NSAIDs (Verified  Allergy, Unknown, contraindicated d/t med hx.-PROTECT 

KIDNEYS, 3/14/18)


     Simvastatin (Verified  Adverse Reaction, Unknown, AVOID TO PROTECT KIDNEYS

, 3/14/18)





Medications





Current Inpatient Medications








 Medications


  (Trade)  Dose


 Ordered  Sig/Joce


 Route  Start Time


 Stop Time Status Last Admin


Dose Admin


 


 Oxycodone HCl


  (Roxicodone


 Immediate Rel Tab)  1 TABLET


 FOR PAIN


 RATING...  Q4H  PRN


 PO  3/14/18 09:15


 3/28/18 09:14  3/15/18 08:01


10 MG


 


 Morphine Sulfate


  (MoRPHine


 SULFATE INJ)  2 mg  Q1HWA  PRN


 IV  3/14/18 09:15


 3/28/18 09:14  3/15/18 04:49


2 MG


 


 Acetaminophen


  (Tylenol Tab)  1,000 mg  Q8


 PO  3/14/18 12:00


 4/13/18 11:59  3/15/18 05:38


1,000 MG


 


 Magnesium


 Hydroxide


  (Milk Of


 Magnesia Susp)  30 ml  Q6H  PRN


 PO  3/14/18 09:15


 4/13/18 09:14   


 


 


 Bisacodyl


  (Dulcolax Supp)  10 mg  DAILY  PRN


 IL  3/14/18 09:15


 4/13/18 09:14   


 


 


 Senna


  (Senokot Tab)  17.2 mg  HS


 PO  3/14/18 21:00


 4/13/18 20:59  3/14/18 20:46


17.2 MG


 


 Docusate Sodium


  (coLACE CAP)  100 mg  BID


 PO  3/14/18 21:00


 4/13/18 20:59  3/15/18 08:55


100 MG


 


 Diphenhydramine


 HCl


  (Benadryl Cap)  25 mg  Q8H  PRN


 PO  3/14/18 09:15


 4/13/18 09:14   


 


 


 Al Hydrox/Mg


 Hydrox/Simethicone


  (Maalox Max Susp)  15 ml  Q4H  PRN


 PO  3/14/18 09:15


 4/13/18 09:14   


 


 


 Multivitamins


  (Multivitamin


 Tab)  1 tab  QAM


 PO  3/15/18 09:00


 4/14/18 08:59  3/15/18 08:55


1 TAB


 


 Ondansetron HCl


  (Zofran Inj)  4 mg  Q6H  PRN


 IV  3/14/18 09:15


 4/13/18 09:14   


 


 


 Metoclopramide HCl


  (Reglan Inj)  10 mg  Q6H  PRN


 IV  3/14/18 09:15


 4/13/18 09:14   


 


 


 Ferrous Gluconate


  (Ferrous


 Gluconate Tab)  324 mg  TIDM


 PO  3/14/18 12:30


 4/13/18 12:29  3/15/18 08:53


324 MG


 


 Pantoprazole


 Sodium


  (Protonix Tab)  40 mg  QAM


 PO  3/15/18 09:00


 3/18/18 09:01  3/15/18 08:55


40 MG


 


 Tamsulosin HCl


  (Flomax Cap)  0.4 mg  QAM  PRN


 PO  3/14/18 09:15


 4/13/18 09:14   


 


 


 Atenolol


  (Tenormin Tab)  50 mg  QAM


 PO  3/15/18 09:00


 4/14/18 08:59  3/15/18 08:54


50 MG


 


 Folic Acid


  (Folvite Tab)  1 mg  QAM


 PO  3/15/18 09:00


 4/14/18 08:59  3/15/18 08:54


1 MG


 


 Gabapentin


  (Neurontin Tab)  600 mg  TID  PRN


 PO  3/14/18 09:15


 4/13/18 09:14   


 


 


 Hydrocortisone


  (Cortef Tab)  10 mg  QPM


 PO  3/14/18 21:00


 4/13/18 20:59  3/14/18 20:46


10 MG


 


 Hydrocortisone


  (Cortef Tab)  20 mg  QAM


 PO  3/15/18 09:00


 4/14/18 08:59  3/15/18 08:54


20 MG


 


 Levothyroxine


 Sodium


  (Synthroid Tab)  300 mcg  DAILYBB


 PO  3/15/18 06:00


 4/14/18 05:59  3/15/18 05:38


300 MCG


 


 Lisinopril


  (Zestril Tab)  20 mg  QAM


 PO  3/15/18 09:00


 4/14/18 08:59  3/15/18 08:55


20 MG


 


 Lorazepam


  (Ativan Tab)  1 mg  TID


 PO  3/14/18 14:00


 4/13/18 13:59  3/15/18 08:53


1 MG


 


 Sertraline HCl


  (Zoloft Tab)  100 mg  QAM


 PO  3/15/18 09:00


 4/14/18 08:59  3/15/18 08:54


100 MG


 


 Sumatriptan


 Succinate


  (Imitrex Tab)  50 mg  PRN  PRN


 PO  3/14/18 09:15


 4/13/18 09:14  3/15/18 00:19


50 MG


 


 Zolpidem Tartrate


  (Ambien Tab)  10 mg  HS


 PO  3/14/18 21:00


 4/13/18 20:59  3/14/18 20:46


10 MG


 


 Miscellaneous


 Information


  (Order Awaiting


 Action)  1 ea  QS


 N/A  3/14/18 16:00


 4/13/18 15:59   


 


 


 Morphine Sulfate


  (MoRPHine


 SULFATE INJ)  4 mg  Q1HWA  PRN


 IV  3/14/18 11:00


 3/28/18 10:59   


 


 


 Miscellaneous


 Information


  (Nursing Ortho


 Warfarin Nomogram


 Dose)  1 ea  TODAY


 N/A  3/15/18 07:45


 3/15/18 07:46 UNV  


 











Review of Systems


Denies complaints related to 10 point organ system review.





Physical Exam


Height & Weight:


Height  5 feet, 10 inches.    


Weight 153.900 (Kilograms) 339 (Pounds)





Last Vital Signs Documentation








  Date Time  Temp Pulse Resp B/P (MAP) Pulse Ox O2 Delivery O2 Flow Rate FiO2


 


3/15/18 07:25      Room Air  


 


3/15/18 07:25 37.2 84 15 124/77 (93) 93   


 


3/14/18 15:29       2.0 








Exam:


GENERAL: Mr. Luong is a 57 y/o white male that appears morbidly obese and 

physically deconditioned.  He does appear older than his stated age.  Speech 

and cognition is intact.  Mood and affect is appropriate.


HEAD: Normocephalic; atraumatic.


EYES: Pupils are round, equal, and reactive to light; EOM intact.


CHEST: Regular chest respiration and excursion.  


EXTREMITIES: Right knee is immobilized and bandaged.


NEURO: CN II-XII grossly intact with no focal deficits noted. AAO x 3.


SKIN: No lesions, erythema, or rashes noted.  Right knee incision was not 

inspected.





Laboratory


Laboratory Results (Last CBC):


3/15/18 05:20











Past Records


Previous Records:  personally reviewed by me (Patient has a history of 

medication misuse and overdose, most recently 6 months ago on Percocet and 

Ativan)





[place] Drug Monitoring Program


Search Results:  patient reviewed within database


Drug Monitoring Findings:


Last Percocet prescription was in 8/2017.  No opioid prescriptions since then.  

Prior to 8/2017, there does appear to be routine opioid prescriptions for 5+ 

years including Percocet and Norco.  He does continue to receive regular Ativan 

and Ambien prescriptions.





Opioid Risk Assessment


Risk assessment performed, high risk identified





Assessment


1.  Right knee pain status post arthroplasty


2.  History of opioid abuse


3.  Morbid Obesity





Recommendations


1.  Patient will continue to ambulate around the room and the hallways of the 

hospital as instructed.  


2.  As per orthopedics, he is to be prescribed a short course of Dilaudid PO 

for postoperative pain.  It is recommended that the patient be offered a 

prescription for Narcan in case of accidental overdose given the patient's 

significant history.

## 2018-03-15 NOTE — DISCHARGE INSTRUCTIONS
Discharge Instructions


Date of Service


Mar 15, 2018.





Admission


Reason for Admission:  Right Knee Osteoarthritis





Discharge


Discharge Diagnosis / Problem:  Right knee s/p total knee replacement





Discharge Goals


Goal(s):  Decrease discomfort, Improve function, Increase independence





Activity Recommendations


Activity Limitations:  as noted below


Lifting Limitations:  gradually increase as tolerated


Exercise/Sports Limitations:  until after follow-up appointment


Shower/Bathe:  keep incision dry


Driving or Machine Use:  No driving until cleared by Dr. Bowen


Weightbearing Status:  Right weightbearing (as tolerated, with knee immobilizer 

on)


Knee flexion to 60 degrees max, for the first 2 weeks. May do full extension. 

Patient needs to have knee immobilizer on when weight bearing


.





Instructions / Follow-Up


Instructions / Follow-Up





New Medicine:  





*  You will likely be taking one or more of these medications:


     


   1.  Dilaudid - Take, as directed, when you need it, every


        four to six hours to control your pain.


   2.  Coumadin - Thins your blood to lessen the chance of forming a blood clot.


        The dose of this is different for each person and is based on your 

blood 


        tests that are done twice a week.





*  The most common side effects of pain medicine and iron are nausea and 

constipation.


    If nausea or constipation is too much of a problem or if you have any 

questions about


    your new medicines or doses, call [facility] at [phone].  

We


    will try to help you manage these issues.








VERY IMPORTANT TO READ AND REVIEW"





Blood Clots and Blood Thinning Medicine:





*  You are given Coumadin during the immediate post-operative period to lessen 

the risk of


    blood clots forming in your legs and/or lungs.  Coumadin is usually given 

for six weeks after


    surgery.


*  The prescription is for 2 mg tablets.  At discharge, you should understand 

your dose and 


    take it all at the same time every day, preferably after dinner.   


*  You need to get your blood checked 1 - 2 times per week for six weeks or as 

directed. 


*  If your dose needs to change, we will call you. Do not take your medication 

on the day of the blood


    test until we call you.





Pain:





*  The immediate post-operative period after knee replacement surgery is often 

quite painful.


*  You are given a prescription for pain medicine.  You should take it, as 

directed, when you 


    need it, especially before physical therapy and before going to bed.  Pain 

that interferes


    with sleep is very common and can last several months.


*  You will likely need pain medicine for the first four to six weeks.  It will 

not stop all of the


    pain.  The pain will lessen and as you feel better, you may change to 

milder pain medicine


    such as Tylenol.  


*  The most common side effects of pain medicine are nausea and constipation, 

so don't take


    more than you need.








Physical Therapy:





*  You will have physical therapy two or three times each week for four to six 

weeks after


    your surgery in order to regain your knee range of motion and to retrain 

your knee


    to work properly.  


*  It is just as important to make sure you are getting your knee perfectly 

straight as


    it is to regain your knee bend.


*  Taking a pain pill an hour before therapy can help you have a more 

productive and 


    comfortable therapy session if needed.





Home Exercise:





*  You were shown a series of exercises (heel props, heel slides, etc.) in the 

hospital.


    Do these exercises three to four times each day including the exercises you 

were 


    shown in physical therapy.





Walking:





*  Get up and walk several times each day.  For the first four weeks, try not 

to stand or


    walk for more than one hour at a time.  If you do stand or walk for more 

than one hour,


    you will not hurt anything, but your knee and leg will likely swell.  


*  As you feel comfortable, you may change from the walker or crutches to a 

cane and 


    then to independent walking.





SELF CARE INSTRUCTIONS AFTER TOTAL KNEE REPLACEMENT





A.  You may need to continue a physical therapy program after discharge from


     the hospital.  There are several options available to you.  Your doctor 

will


      assist you in selecting the best one for you.


   1.  An out-patient facility 2 to 3 times a week for therapy or home therapy.


   2.  Continue working on all exercises taught to you in the hospital.  Your


                  goals should be to increase bending of your knee to 90 

degrees and


              beyond and to fully straighten your knee.





B.  You may progress at your own pace from walking with a walker or crutches


      to a cane; then to no assistive devices.





C.  Make walking a part of your daily routine.  Be up as much as comfortable 


     with rest periods throughout the day.  Rest with leg elevation is very 

important.


      Use the ice wrap frequently for the first 3-4 weeks.





D.  There are no restrictions on activities.  You may ride in a car, shop, 

participate


      in household chores and all social activities.





E.  Wear the long elastic stockings (MAAME hose) 20 hours a day for six weeks 

after surgery.  


     They can be removed several times a day for laundering and for a shower.





F.  Do not place a pillow behind your knee when resting.  A pillow at your 

ankle is okay.








**VERY IMPORTANT TO READ AND REVIEW**





A.  Take Coumadin, Aspirin or Lovenox (blood thinning medications) as directed 

by your


     doctor.  If on Coumadin, have a pro-time (blood test) drawn according to 

your doctor's 


     instructions.  This will tell the doctor how well the Coumadin is thinning 

your blood.





   1.  YOU WILL BE GIVEN AN ORDER AT DISCHARGE FOR PT/INR (BLOOD WORK).  


        PLEASE HAVE THIS DONE AS INSTRUCTED.  PLEASE CALL OUR OFFICE


        AFTER YOUR BLOODWORK IS COMPLETE SO WE CAN TRACK YOUR RESULTS.  


        IF YOU ARE GOING TO OUTPATIENT PHYSICAL THERAPY, YOU WILL


        NEED TO GO TO OUTPATIENT TESTING TO HAVE IT DRAWN.





B.  There are a few signs you need to watch for after you are home.  Call 


     [facility] if you notice any of the followin.  Increased severe knee pain.  Some pain is expected especially


              when you exercise.


   2.  Increased swelling in your leg or knee; pain or swelling of the


              calf muscle in either lower leg.


   3.  Any fluid drainage from the incision.


   4.  Shortness of breath or chest pain.





C.  Please call [facility] at [phone] if you have any 


     concerns or questions about your operation or recovery.  The doctor or his 


     nurse will return your call promptly.





D.  You must take antibiotics before dental work, bladder, bowel or other 

surgery.  


     Call the office to obtain a prescription at least 2 days prior to your 

appointment.





*  CALL IF INCREASED PAIN, REDNESS, DRAINAGE OR FEVER GREATER THAT 101.





*  Sutures should be removed 12-14 days after surgery unless you are on chronic 


    steriods, then it will be 14-18 days after surgery.





Call your doctor if:





*  Temperature above 101 degrees F.


*  Pain not relieved by pain medicine ordered.


*  Increased drainage or redness from incision.


*  Notify your doctor with any questions or concerns.





Current Hospital Diet


Patient's current hospital diet: Regular Diet





Discharge Diet


Recommended Diet:  Regular Diet





Procedures


Procedures Performed:  


Right Total Knee Arthroplasty





Pending Studies


Studies pending at discharge:  no





Medical Emergencies








.


Who to Call and When:





Medical Emergencies:  If at any time you feel your situation is an emergency, 

please call 911 immediately.





.





Non-Emergent Contact


Non-Emergency issues call your:  Primary Care Provider, Surgeon


Call Non-Emergent contact if:  temperature is above 101, wound has increased 

drainage, wound has increased redness, wound has increased pain, you have any 

medication questions


.








"Provider Documentation" section prepared by Homar Allen PA-C.








.





[place] Drug Monitoring Program


Search Results:  patient reviewed within database, no issues identified

## 2018-03-15 NOTE — ORTHOPEDIC PROGRESS NOTE
Orthopedic Progress Note


Date of Service


Mar 15, 2018.





Subjective


Post OP Day:  1


Reports: complaints (of R knee pain.), Denies: chest pain, SOB, nausea / 

vomiting, light headedness, calf pain


Additional Notes:


States he did get out of bed last night to go to the bathroom





Objective


calves soft nontender, N/V intact, capillary refill less than 2 sec., dressing C

/D/I, incision C/D/I, A&O x3, toes mobile, CMS intact


Moderate drainage on bandages. No active bleeding. Expected post op edema.











  Date Time  Temp Pulse Resp B/P (MAP) Pulse Ox O2 Delivery O2 Flow Rate FiO2


 


3/15/18 07:25      Room Air  


 


3/15/18 07:25 37.2 84 15 124/77 (93) 93 Room Air  


 


3/15/18 03:28 37.0 79 17 115/71 (86) 94 Room Air  


 


3/14/18 23:40      Room Air  


 


3/14/18 23:18 37.5 102 17 112/71 (85) 92 Room Air  


 


3/14/18 20:01 36.9 101 16 130/81 (97) 94 Room Air  


 


3/14/18 15:29 37.0 87 18 119/65 (83) 92 Nasal Cannula 2.0 


 


3/14/18 15:10      Nasal Cannula 2.0 


 


3/14/18 12:47 36.9 83 18 126/70 (88) 94 Nasal Cannula 2.0 


 


3/14/18 11:47 36.9 80 19 121/70 (87) 96 Nasal Cannula 2.0 


 


3/14/18 10:49 36.4 70 17 131/75 (93) 96 Nasal Cannula  


 


3/14/18 10:21 36.7 62 18 128/79 (95) 97 Nasal Cannula 2.0 


 


3/14/18 09:55 36.5 66 18 122/77 (92) 96 Nasal Cannula 2.0 


 


3/14/18 09:55      Nasal Cannula 2.0 


 


3/14/18 09:55      Nasal Cannula 2.0 


 


3/14/18 09:30  74 20 98/55 95 Nasal Cannula 2 








Laboratory Results 24 Hours:











Test


  3/15/18


05:20


 


Hematocrit 40.1 % 


 


Hemoglobin 13.4 g/dL 


 


Prothromb Time International


Ratio 1.0 


 


 


Prothrombin Time 10.8 SECONDS 











Assessment & Plan


Assessment:


Post op day 1 Right total knee arthroplasty


Plan:


PT/OT today


coumadin per nomogram. Please give today's dose prior to departure


dressing changed this morning-wound looks good


WBAT with walker and immobilizer


D/C today to [facility] or  home health pending insurance.


Follow up in the office in 1 week for Prevena removal.








Discharge Planning


Discharge Planning:  uncertain


Pain Management:  Dilaudid


DVT Prophylaxis:  TEDs, SCDs, Coumadin


Therapy:  Physical Therapy, Occupational Therapy

## 2018-03-15 NOTE — DISCHARGE SUMMARY
CHIEF COMPLAINT:  Right knee pain.

 

HISTORY OF PRESENT ILLNESS:  A 58-year-old male admitted for elective right

total knee replacement.  He is morbidly obese.

 

He has been on chronic narcotics and has a history of having narcotic use. 

It was discussed in detail with him preoperatively with everybody including

his primary care doctor.

 

PAST MEDICAL HISTORY:  Remarkable for chronic pain, hypertension,

hypothyroidism, osteoarthritis, sleep apnea, hypercholesterolemia, renal

disease, narcotic abuse history.

 

PAST SURGICAL HISTORY:  Remarkable for left quadriceps tendon repair, right

Achilles tendon repair.

 

FAMILY HISTORY:  Remarkable for cancer and heart disease.

 

SOCIAL HISTORY:  Reveals he is .  No tobacco or alcohol use.  History

of narcotic abuse.  No cigarette use.  Positive smokeless tobacco.

 

ALLERGIES:  ADVIL SECONDARY TO RENAL DISEASE.

 

PREADMISSION MEDICATIONS:  Include Ambien, atenolol, benazepril, Cortef,

Lexapro, lorazepam, sertraline, Synthroid, Tylenol and testosterone.

 

REVIEW OF SYSTEMS:  Noncontributory.

 

PERTINENT PHYSICAL EXAMINATION:  Reveals neurovascular check to be normal. 

No evidence of DVT.  No major drainage from the wound.  Postop x-rays look

excellent.

 

ASSESSMENT:  Overall, doing well and will be discharged later today after PT,

OT.  Discharge on Dilaudid 2 mg p.o. q. 4 hours p.r.n., dispense 30 pills, no

refills.  After that pain management will be secondary to his primary care

physician, Dr. Alfonso.  Continue all preadmission medications.  Follow up

in 1 week for dressing removal.  We will place Prevena on today.

## 2018-03-30 ENCOUNTER — HOSPITAL ENCOUNTER (OUTPATIENT)
Dept: HOSPITAL 45 - C.LABSPEC | Age: 58
Discharge: HOME | End: 2018-03-30
Attending: ORTHOPAEDIC SURGERY
Payer: COMMERCIAL

## 2018-03-30 DIAGNOSIS — Z01.89: Primary | ICD-10-CM

## 2018-03-30 LAB — INR PPP: 2.2 (ref 0.9–1.1)

## 2018-04-06 ENCOUNTER — HOSPITAL ENCOUNTER (OUTPATIENT)
Dept: HOSPITAL 45 - C.LABSPEC | Age: 58
Discharge: HOME | End: 2018-04-06
Attending: ORTHOPAEDIC SURGERY
Payer: COMMERCIAL

## 2018-04-06 DIAGNOSIS — Z79.01: Primary | ICD-10-CM

## 2018-04-06 LAB — INR PPP: 2.4 (ref 0.9–1.1)

## 2018-04-20 NOTE — CODING QUERY MEDICAL NECESSITY
***Valid Physician Order Needed***



A valid physician order must be submitted in order to properly bill for the service(s) 
provided, including date of service(s), valid diagnosis, and physician signature. If these 
tests are done on a recurring basis the original physican order must be submitted in order 
to code and bill for the service(s) provided.



****Please fax us the original, signed physician order so that we may expedite billing to 
[phone]



DOS 3/30/18

* PT/INR





Thank you  

[facility] Information Management

Phone:  [phone]

Fax:  [phone]

## 2018-04-30 ENCOUNTER — HOSPITAL ENCOUNTER (OUTPATIENT)
Dept: HOSPITAL 45 - C.RDSM | Age: 58
Discharge: HOME | End: 2018-04-30
Attending: ORTHOPAEDIC SURGERY
Payer: COMMERCIAL

## 2018-04-30 DIAGNOSIS — Z96.651: Primary | ICD-10-CM

## 2018-07-30 ENCOUNTER — HOSPITAL ENCOUNTER (OUTPATIENT)
Dept: HOSPITAL 45 - C.LAB1850 | Age: 58
Discharge: HOME | End: 2018-07-30
Attending: INTERNAL MEDICINE
Payer: COMMERCIAL

## 2018-07-30 DIAGNOSIS — E03.8: ICD-10-CM

## 2018-07-30 DIAGNOSIS — E23.6: ICD-10-CM

## 2018-07-30 DIAGNOSIS — Z87.898: ICD-10-CM

## 2018-07-30 DIAGNOSIS — E23.0: Primary | ICD-10-CM

## 2018-07-30 LAB
BASOPHILS # BLD: 0.02 K/UL (ref 0–0.2)
BASOPHILS NFR BLD: 0.3 %
EOS ABS #: 0.21 K/UL (ref 0–0.5)
EOSINOPHIL NFR BLD AUTO: 231 K/UL (ref 130–400)
HCT VFR BLD CALC: 43.2 % (ref 42–52)
HGB BLD-MCNC: 14 G/DL (ref 14–18)
IG#: 0.01 K/UL (ref 0–0.02)
IMM GRANULOCYTES NFR BLD AUTO: 33.3 %
LYMPHOCYTES # BLD: 2.65 K/UL (ref 1.2–3.4)
MCH RBC QN AUTO: 30 PG (ref 25–34)
MCHC RBC AUTO-ENTMCNC: 32.4 G/DL (ref 32–36)
MCV RBC AUTO: 92.7 FL (ref 80–100)
MONO ABS #: 0.42 K/UL (ref 0.11–0.59)
MONOCYTES NFR BLD: 5.3 %
NEUT ABS #: 4.65 K/UL (ref 1.4–6.5)
NEUTROPHILS # BLD AUTO: 2.6 %
NEUTROPHILS NFR BLD AUTO: 58.4 %
PMV BLD AUTO: 11 FL (ref 7.4–10.4)
RED CELL DISTRIBUTION WIDTH CV: 13.8 % (ref 11.5–14.5)
RED CELL DISTRIBUTION WIDTH SD: 46.8 FL (ref 36.4–46.3)
WBC # BLD AUTO: 7.96 K/UL (ref 4.8–10.8)

## 2018-08-03 LAB
IGF-I SERPL-MCNC: 290 NG/ML (ref 50–317)
TESTOST SERPL-MCNC: 7 NG/DL (ref 250–1100)